# Patient Record
Sex: MALE | Race: WHITE | HISPANIC OR LATINO | URBAN - METROPOLITAN AREA
[De-identification: names, ages, dates, MRNs, and addresses within clinical notes are randomized per-mention and may not be internally consistent; named-entity substitution may affect disease eponyms.]

---

## 2022-09-21 ENCOUNTER — INPATIENT (INPATIENT)
Facility: HOSPITAL | Age: 22
LOS: 6 days | Discharge: HOME | End: 2022-09-28
Attending: THORACIC SURGERY (CARDIOTHORACIC VASCULAR SURGERY) | Admitting: THORACIC SURGERY (CARDIOTHORACIC VASCULAR SURGERY)

## 2022-09-21 VITALS
OXYGEN SATURATION: 98 % | RESPIRATION RATE: 118 BRPM | HEART RATE: 118 BPM | SYSTOLIC BLOOD PRESSURE: 126 MMHG | TEMPERATURE: 104 F | DIASTOLIC BLOOD PRESSURE: 72 MMHG | WEIGHT: 145.06 LBS

## 2022-09-21 DIAGNOSIS — M62.82 RHABDOMYOLYSIS: ICD-10-CM

## 2022-09-21 DIAGNOSIS — R59.0 LOCALIZED ENLARGED LYMPH NODES: ICD-10-CM

## 2022-09-21 DIAGNOSIS — I31.4 CARDIAC TAMPONADE: ICD-10-CM

## 2022-09-21 DIAGNOSIS — E83.51 HYPOCALCEMIA: ICD-10-CM

## 2022-09-21 DIAGNOSIS — R73.09 OTHER ABNORMAL GLUCOSE: ICD-10-CM

## 2022-09-21 DIAGNOSIS — I31.3 PERICARDIAL EFFUSION (NONINFLAMMATORY): ICD-10-CM

## 2022-09-21 DIAGNOSIS — B27.00 GAMMAHERPESVIRAL MONONUCLEOSIS WITHOUT COMPLICATION: ICD-10-CM

## 2022-09-21 DIAGNOSIS — A41.9 SEPSIS, UNSPECIFIED ORGANISM: ICD-10-CM

## 2022-09-21 DIAGNOSIS — E83.39 OTHER DISORDERS OF PHOSPHORUS METABOLISM: ICD-10-CM

## 2022-09-21 DIAGNOSIS — R50.9 FEVER, UNSPECIFIED: ICD-10-CM

## 2022-09-21 DIAGNOSIS — E87.1 HYPO-OSMOLALITY AND HYPONATREMIA: ICD-10-CM

## 2022-09-21 DIAGNOSIS — E87.8 OTHER DISORDERS OF ELECTROLYTE AND FLUID BALANCE, NOT ELSEWHERE CLASSIFIED: ICD-10-CM

## 2022-09-21 DIAGNOSIS — E87.2 ACIDOSIS: ICD-10-CM

## 2022-09-21 LAB
ALBUMIN SERPL ELPH-MCNC: 4.2 G/DL — SIGNIFICANT CHANGE UP (ref 3.5–5.2)
ALP SERPL-CCNC: 82 U/L — SIGNIFICANT CHANGE UP (ref 30–115)
ALT FLD-CCNC: 72 U/L — HIGH (ref 0–41)
ANION GAP SERPL CALC-SCNC: 14 MMOL/L — SIGNIFICANT CHANGE UP (ref 7–14)
APTT BLD: 31.7 SEC — SIGNIFICANT CHANGE UP (ref 27–39.2)
AST SERPL-CCNC: 148 U/L — HIGH (ref 0–41)
BASOPHILS # BLD AUTO: 0.01 K/UL — SIGNIFICANT CHANGE UP (ref 0–0.2)
BASOPHILS NFR BLD AUTO: 0.1 % — SIGNIFICANT CHANGE UP (ref 0–1)
BILIRUB SERPL-MCNC: 0.4 MG/DL — SIGNIFICANT CHANGE UP (ref 0.2–1.2)
BUN SERPL-MCNC: 11 MG/DL — SIGNIFICANT CHANGE UP (ref 10–20)
CALCIUM SERPL-MCNC: 8.7 MG/DL — SIGNIFICANT CHANGE UP (ref 8.4–10.4)
CHLORIDE SERPL-SCNC: 92 MMOL/L — LOW (ref 98–110)
CO2 SERPL-SCNC: 19 MMOL/L — SIGNIFICANT CHANGE UP (ref 17–32)
CREAT SERPL-MCNC: 1 MG/DL — SIGNIFICANT CHANGE UP (ref 0.7–1.5)
EGFR: 110 ML/MIN/1.73M2 — SIGNIFICANT CHANGE UP
EOSINOPHIL # BLD AUTO: 0 K/UL — SIGNIFICANT CHANGE UP (ref 0–0.7)
EOSINOPHIL NFR BLD AUTO: 0 % — SIGNIFICANT CHANGE UP (ref 0–8)
FLUAV AG NPH QL: SIGNIFICANT CHANGE UP
FLUBV AG NPH QL: SIGNIFICANT CHANGE UP
GLUCOSE SERPL-MCNC: 126 MG/DL — HIGH (ref 70–99)
HCT VFR BLD CALC: 44.4 % — SIGNIFICANT CHANGE UP (ref 42–52)
HGB BLD-MCNC: 15.3 G/DL — SIGNIFICANT CHANGE UP (ref 14–18)
IMM GRANULOCYTES NFR BLD AUTO: 0.4 % — HIGH (ref 0.1–0.3)
INR BLD: 1.25 RATIO — SIGNIFICANT CHANGE UP (ref 0.65–1.3)
LACTATE SERPL-SCNC: 1.5 MMOL/L — SIGNIFICANT CHANGE UP (ref 0.7–2)
LYMPHOCYTES # BLD AUTO: 0.92 K/UL — LOW (ref 1.2–3.4)
LYMPHOCYTES # BLD AUTO: 8.8 % — LOW (ref 20.5–51.1)
MAGNESIUM SERPL-MCNC: 2.1 MG/DL — SIGNIFICANT CHANGE UP (ref 1.8–2.4)
MCHC RBC-ENTMCNC: 27.9 PG — SIGNIFICANT CHANGE UP (ref 27–31)
MCHC RBC-ENTMCNC: 34.5 G/DL — SIGNIFICANT CHANGE UP (ref 32–37)
MCV RBC AUTO: 81 FL — SIGNIFICANT CHANGE UP (ref 80–94)
MONOCYTES # BLD AUTO: 0.28 K/UL — SIGNIFICANT CHANGE UP (ref 0.1–0.6)
MONOCYTES NFR BLD AUTO: 2.7 % — SIGNIFICANT CHANGE UP (ref 1.7–9.3)
NEUTROPHILS # BLD AUTO: 9.2 K/UL — HIGH (ref 1.4–6.5)
NEUTROPHILS NFR BLD AUTO: 88 % — HIGH (ref 42.2–75.2)
NRBC # BLD: 0 /100 WBCS — SIGNIFICANT CHANGE UP (ref 0–0)
PLATELET # BLD AUTO: 219 K/UL — SIGNIFICANT CHANGE UP (ref 130–400)
POTASSIUM SERPL-MCNC: 5.5 MMOL/L — HIGH (ref 3.5–5)
POTASSIUM SERPL-SCNC: 5.5 MMOL/L — HIGH (ref 3.5–5)
PROT SERPL-MCNC: 7.4 G/DL — SIGNIFICANT CHANGE UP (ref 6–8)
PROTHROM AB SERPL-ACNC: 14.3 SEC — HIGH (ref 9.95–12.87)
RBC # BLD: 5.48 M/UL — SIGNIFICANT CHANGE UP (ref 4.7–6.1)
RBC # FLD: 13.1 % — SIGNIFICANT CHANGE UP (ref 11.5–14.5)
RSV RNA NPH QL NAA+NON-PROBE: SIGNIFICANT CHANGE UP
SARS-COV-2 RNA SPEC QL NAA+PROBE: SIGNIFICANT CHANGE UP
SODIUM SERPL-SCNC: 125 MMOL/L — LOW (ref 135–146)
WBC # BLD: 10.45 K/UL — SIGNIFICANT CHANGE UP (ref 4.8–10.8)
WBC # FLD AUTO: 10.45 K/UL — SIGNIFICANT CHANGE UP (ref 4.8–10.8)

## 2022-09-21 PROCEDURE — 99285 EMERGENCY DEPT VISIT HI MDM: CPT

## 2022-09-21 PROCEDURE — 93010 ELECTROCARDIOGRAM REPORT: CPT

## 2022-09-21 PROCEDURE — 70491 CT SOFT TISSUE NECK W/DYE: CPT | Mod: 26,MA

## 2022-09-21 PROCEDURE — 71045 X-RAY EXAM CHEST 1 VIEW: CPT | Mod: 26

## 2022-09-21 RX ORDER — PIPERACILLIN AND TAZOBACTAM 4; .5 G/20ML; G/20ML
3.38 INJECTION, POWDER, LYOPHILIZED, FOR SOLUTION INTRAVENOUS ONCE
Refills: 0 | Status: COMPLETED | OUTPATIENT
Start: 2022-09-21 | End: 2022-09-21

## 2022-09-21 RX ORDER — SODIUM CHLORIDE 9 MG/ML
2000 INJECTION, SOLUTION INTRAVENOUS ONCE
Refills: 0 | Status: COMPLETED | OUTPATIENT
Start: 2022-09-21 | End: 2022-09-21

## 2022-09-21 RX ORDER — ACETAMINOPHEN 500 MG
975 TABLET ORAL ONCE
Refills: 0 | Status: COMPLETED | OUTPATIENT
Start: 2022-09-21 | End: 2022-09-21

## 2022-09-21 RX ADMIN — SODIUM CHLORIDE 2000 MILLILITER(S): 9 INJECTION, SOLUTION INTRAVENOUS at 21:50

## 2022-09-21 RX ADMIN — PIPERACILLIN AND TAZOBACTAM 200 GRAM(S): 4; .5 INJECTION, POWDER, LYOPHILIZED, FOR SOLUTION INTRAVENOUS at 22:12

## 2022-09-21 RX ADMIN — Medication 975 MILLIGRAM(S): at 21:50

## 2022-09-21 NOTE — ED PROVIDER NOTE - NS ED ATTENDING STATEMENT MOD
This was a shared visit with the ABISAI. I reviewed and verified the documentation and independently performed the documented:

## 2022-09-21 NOTE — ED PROVIDER NOTE - NS ED ROS FT
Review of Systems  Constitutional: (+) fever, chills, body aches.  Eyes:  No visual changes, eye pain, or discharge.  ENMT:  No hearing changes, pain, or discharge. No nasal congestion, discharge, or bleeding. No throat pain, swelling, or difficulty swallowing. (+) R lower dental pain/swelling  Cardiac:  No chest pain, palpitations, syncope, or edema.  Respiratory:  No dyspnea, cough. No hemoptysis.  GI:  No nausea, vomiting, diarrhea, or abdominal pain.   :  No dysuria, hematuria, frequency, or burning.   MS:  No back pain.  Skin:  No skin rash, pruritis, jaundice, or lesions.  Neuro:  No headache, dizziness, loss of sensation, or focal weakness.  No change in mental status.

## 2022-09-21 NOTE — ED PROVIDER NOTE - CLINICAL SUMMARY MEDICAL DECISION MAKING FREE TEXT BOX
Patient presents with right facial . Required labs and imaging will start on antibiotics. Will be admitted for further management.

## 2022-09-21 NOTE — ED PROVIDER NOTE - ATTENDING APP SHARED VISIT CONTRIBUTION OF CARE
1 week of worsening right facial swelling associate with 2 days of fever.  Able to tolerate p.o. no change in voice denies any tooth pain tolerating secretions endorses associated body aches with symptoms.  On exam there is right facial asymmetry of breath right maxillary swelling.  Uvula is midline.  There is no trismus.  Oropharynx otherwise is normal.  Neck exam is normal with some mild lymphadenopathy on the right side.  Lungs are clear abdomen is soft.  There is no rash noted.  Plan is to obtain CT of the face and neck.  Check blood work and start IV antibiotics

## 2022-09-21 NOTE — ED ADULT TRIAGE NOTE - CHIEF COMPLAINT QUOTE
Pt BIBA from urgent care for R facial swelling x1 week. Pt denies any SOB or difficulty breathing. Pt able to swallow and speak full sentences. Pt with no allergic reaction. Pt in triage temp of 103.6

## 2022-09-21 NOTE — ED PROVIDER NOTE - OBJECTIVE STATEMENT
21 yo M with no significant PMHx presents to the ED c/o R sided lower dental pain x 1 week with development of fever/diffuse myalgias x 2 days. [place] [facility] [place], tested negative for covid and represented today because symptoms are worsening. He denies hx of similar symptoms in the past. He has been taking advil with mild relief of symptoms. No known sick contacts. No other complaints.

## 2022-09-21 NOTE — ED ADULT NURSE NOTE - OBJECTIVE STATEMENT
pt presents co facial swelling x 1 week and fever. Pt states he has not been around anyone ill, no medical hx of swelling and denies any bites to the right side of the face. Pt presents febrile on assessment. AAOx4, speaking in clear and complete sentences (Japanese speaking). Denies pmhx.

## 2022-09-21 NOTE — ED PROVIDER NOTE - PHYSICAL EXAMINATION
VITAL SIGNS: I have reviewed nursing notes and confirm.  CONSTITUTIONAL: 23 yo M laying on stretcher; in no acute distress.  SKIN: Skin exam is warm and dry, no acute rash.  HEAD: Normocephalic; atraumatic.  EYES: PERRL, EOM intact; conjunctiva and sclera clear.  ENT: No nasal discharge; airway clear. No TTP or evidence of dental abscess. Oropharynx clear. Uvula midline. No drooling. (+) R maxillary/submandibular swelling.   NECK: Supple; non tender. No meningeal signs.   CARD: S1, S2 normal; no murmurs, gallops, or rubs. Tachycardic, regular.   RESP: No wheezes, rales or rhonchi. Speaking in full sentences.   ABD: Normal bowel sounds; soft; non-distended; non-tender; No rebound or guarding. No CVA tenderness.  EXT: Normal ROM. No clubbing, cyanosis or edema.  NEURO: Alert, oriented. Grossly unremarkable. No focal deficits.

## 2022-09-22 LAB
ALBUMIN SERPL ELPH-MCNC: 3.7 G/DL — SIGNIFICANT CHANGE UP (ref 3.5–5.2)
ALBUMIN SERPL ELPH-MCNC: 3.9 G/DL — SIGNIFICANT CHANGE UP (ref 3.5–5.2)
ALP SERPL-CCNC: 70 U/L — SIGNIFICANT CHANGE UP (ref 30–115)
ALP SERPL-CCNC: 74 U/L — SIGNIFICANT CHANGE UP (ref 30–115)
ALT FLD-CCNC: 71 U/L — HIGH (ref 0–41)
ALT FLD-CCNC: 85 U/L — HIGH (ref 0–41)
ANION GAP SERPL CALC-SCNC: 13 MMOL/L — SIGNIFICANT CHANGE UP (ref 7–14)
ANION GAP SERPL CALC-SCNC: 15 MMOL/L — HIGH (ref 7–14)
APPEARANCE UR: CLEAR — SIGNIFICANT CHANGE UP
AST SERPL-CCNC: 138 U/L — HIGH (ref 0–41)
AST SERPL-CCNC: 211 U/L — HIGH (ref 0–41)
BACTERIA # UR AUTO: NEGATIVE — SIGNIFICANT CHANGE UP
BILIRUB SERPL-MCNC: 0.5 MG/DL — SIGNIFICANT CHANGE UP (ref 0.2–1.2)
BILIRUB SERPL-MCNC: 0.5 MG/DL — SIGNIFICANT CHANGE UP (ref 0.2–1.2)
BILIRUB UR-MCNC: NEGATIVE — SIGNIFICANT CHANGE UP
BUN SERPL-MCNC: 10 MG/DL — SIGNIFICANT CHANGE UP (ref 10–20)
BUN SERPL-MCNC: 10 MG/DL — SIGNIFICANT CHANGE UP (ref 10–20)
CALCIUM SERPL-MCNC: 8 MG/DL — LOW (ref 8.4–10.5)
CALCIUM SERPL-MCNC: 8.5 MG/DL — SIGNIFICANT CHANGE UP (ref 8.4–10.4)
CHLORIDE SERPL-SCNC: 96 MMOL/L — LOW (ref 98–110)
CHLORIDE SERPL-SCNC: 99 MMOL/L — SIGNIFICANT CHANGE UP (ref 98–110)
CK SERPL-CCNC: 5941 U/L — HIGH (ref 0–225)
CK SERPL-CCNC: HIGH U/L (ref 0–225)
CK SERPL-CCNC: HIGH U/L (ref 0–225)
CO2 SERPL-SCNC: 19 MMOL/L — SIGNIFICANT CHANGE UP (ref 17–32)
CO2 SERPL-SCNC: 22 MMOL/L — SIGNIFICANT CHANGE UP (ref 17–32)
COLOR SPEC: YELLOW — SIGNIFICANT CHANGE UP
CREAT SERPL-MCNC: 0.8 MG/DL — SIGNIFICANT CHANGE UP (ref 0.7–1.5)
CREAT SERPL-MCNC: 0.9 MG/DL — SIGNIFICANT CHANGE UP (ref 0.7–1.5)
CRP SERPL-MCNC: 151.9 MG/L — HIGH
DIFF PNL FLD: ABNORMAL
EGFR: 125 ML/MIN/1.73M2 — SIGNIFICANT CHANGE UP
EGFR: 129 ML/MIN/1.73M2 — SIGNIFICANT CHANGE UP
EPI CELLS # UR: 2 /HPF — SIGNIFICANT CHANGE UP (ref 0–5)
ERYTHROCYTE [SEDIMENTATION RATE] IN BLOOD: 6 MM/HR — SIGNIFICANT CHANGE UP (ref 0–10)
GLUCOSE SERPL-MCNC: 130 MG/DL — HIGH (ref 70–99)
GLUCOSE SERPL-MCNC: 140 MG/DL — HIGH (ref 70–99)
GLUCOSE UR QL: ABNORMAL
HCT VFR BLD CALC: 40.5 % — LOW (ref 42–52)
HGB BLD-MCNC: 14 G/DL — SIGNIFICANT CHANGE UP (ref 14–18)
HYALINE CASTS # UR AUTO: 0 /LPF — SIGNIFICANT CHANGE UP (ref 0–7)
KETONES UR-MCNC: ABNORMAL
LACTATE SERPL-SCNC: 1.6 MMOL/L — SIGNIFICANT CHANGE UP (ref 0.7–2)
LEUKOCYTE ESTERASE UR-ACNC: NEGATIVE — SIGNIFICANT CHANGE UP
MCHC RBC-ENTMCNC: 28 PG — SIGNIFICANT CHANGE UP (ref 27–31)
MCHC RBC-ENTMCNC: 34.6 G/DL — SIGNIFICANT CHANGE UP (ref 32–37)
MCV RBC AUTO: 81 FL — SIGNIFICANT CHANGE UP (ref 80–94)
NITRITE UR-MCNC: NEGATIVE — SIGNIFICANT CHANGE UP
NRBC # BLD: 0 /100 WBCS — SIGNIFICANT CHANGE UP (ref 0–0)
PH UR: 6.5 — SIGNIFICANT CHANGE UP (ref 5–8)
PLATELET # BLD AUTO: 182 K/UL — SIGNIFICANT CHANGE UP (ref 130–400)
POTASSIUM SERPL-MCNC: 3.6 MMOL/L — SIGNIFICANT CHANGE UP (ref 3.5–5)
POTASSIUM SERPL-MCNC: 4.2 MMOL/L — SIGNIFICANT CHANGE UP (ref 3.5–5)
POTASSIUM SERPL-SCNC: 3.6 MMOL/L — SIGNIFICANT CHANGE UP (ref 3.5–5)
POTASSIUM SERPL-SCNC: 4.2 MMOL/L — SIGNIFICANT CHANGE UP (ref 3.5–5)
PROT SERPL-MCNC: 6.1 G/DL — SIGNIFICANT CHANGE UP (ref 6–8)
PROT SERPL-MCNC: 6.3 G/DL — SIGNIFICANT CHANGE UP (ref 6–8)
PROT UR-MCNC: ABNORMAL
RBC # BLD: 5 M/UL — SIGNIFICANT CHANGE UP (ref 4.7–6.1)
RBC # FLD: 13.1 % — SIGNIFICANT CHANGE UP (ref 11.5–14.5)
RBC CASTS # UR COMP ASSIST: 1 /HPF — SIGNIFICANT CHANGE UP (ref 0–4)
SODIUM SERPL-SCNC: 130 MMOL/L — LOW (ref 135–146)
SODIUM SERPL-SCNC: 134 MMOL/L — LOW (ref 135–146)
SP GR SPEC: 1.04 — HIGH (ref 1.01–1.03)
TSH SERPL-MCNC: 0.7 UIU/ML — SIGNIFICANT CHANGE UP (ref 0.27–4.2)
UROBILINOGEN FLD QL: ABNORMAL
WBC # BLD: 9.31 K/UL — SIGNIFICANT CHANGE UP (ref 4.8–10.8)
WBC # FLD AUTO: 9.31 K/UL — SIGNIFICANT CHANGE UP (ref 4.8–10.8)
WBC UR QL: 1 /HPF — SIGNIFICANT CHANGE UP (ref 0–5)

## 2022-09-22 PROCEDURE — 99254 IP/OBS CNSLTJ NEW/EST MOD 60: CPT | Mod: 25

## 2022-09-22 PROCEDURE — 31575 DIAGNOSTIC LARYNGOSCOPY: CPT

## 2022-09-22 PROCEDURE — 99223 1ST HOSP IP/OBS HIGH 75: CPT

## 2022-09-22 PROCEDURE — 76705 ECHO EXAM OF ABDOMEN: CPT | Mod: 26

## 2022-09-22 RX ORDER — ACETAMINOPHEN 500 MG
650 TABLET ORAL EVERY 6 HOURS
Refills: 0 | Status: DISCONTINUED | OUTPATIENT
Start: 2022-09-22 | End: 2022-09-22

## 2022-09-22 RX ORDER — KETOROLAC TROMETHAMINE 30 MG/ML
15 SYRINGE (ML) INJECTION ONCE
Refills: 0 | Status: DISCONTINUED | OUTPATIENT
Start: 2022-09-22 | End: 2022-09-22

## 2022-09-22 RX ORDER — SODIUM CHLORIDE 9 MG/ML
1000 INJECTION INTRAMUSCULAR; INTRAVENOUS; SUBCUTANEOUS
Refills: 0 | Status: DISCONTINUED | OUTPATIENT
Start: 2022-09-22 | End: 2022-09-24

## 2022-09-22 RX ORDER — LANOLIN ALCOHOL/MO/W.PET/CERES
3 CREAM (GRAM) TOPICAL AT BEDTIME
Refills: 0 | Status: DISCONTINUED | OUTPATIENT
Start: 2022-09-22 | End: 2022-09-28

## 2022-09-22 RX ORDER — IBUPROFEN 200 MG
400 TABLET ORAL ONCE
Refills: 0 | Status: COMPLETED | OUTPATIENT
Start: 2022-09-22 | End: 2022-09-22

## 2022-09-22 RX ORDER — AMPICILLIN SODIUM AND SULBACTAM SODIUM 250; 125 MG/ML; MG/ML
3 INJECTION, POWDER, FOR SUSPENSION INTRAMUSCULAR; INTRAVENOUS EVERY 6 HOURS
Refills: 0 | Status: DISCONTINUED | OUTPATIENT
Start: 2022-09-22 | End: 2022-09-28

## 2022-09-22 RX ORDER — ACETAMINOPHEN 500 MG
1000 TABLET ORAL ONCE
Refills: 0 | Status: COMPLETED | OUTPATIENT
Start: 2022-09-22 | End: 2022-09-22

## 2022-09-22 RX ORDER — SODIUM CHLORIDE 9 MG/ML
1000 INJECTION, SOLUTION INTRAVENOUS ONCE
Refills: 0 | Status: COMPLETED | OUTPATIENT
Start: 2022-09-22 | End: 2022-09-22

## 2022-09-22 RX ORDER — SODIUM CHLORIDE 9 MG/ML
1000 INJECTION INTRAMUSCULAR; INTRAVENOUS; SUBCUTANEOUS
Refills: 0 | Status: DISCONTINUED | OUTPATIENT
Start: 2022-09-22 | End: 2022-09-22

## 2022-09-22 RX ORDER — ACETAMINOPHEN 500 MG
650 TABLET ORAL ONCE
Refills: 0 | Status: DISCONTINUED | OUTPATIENT
Start: 2022-09-22 | End: 2022-09-22

## 2022-09-22 RX ORDER — ENOXAPARIN SODIUM 100 MG/ML
40 INJECTION SUBCUTANEOUS EVERY 24 HOURS
Refills: 0 | Status: DISCONTINUED | OUTPATIENT
Start: 2022-09-22 | End: 2022-09-28

## 2022-09-22 RX ORDER — ONDANSETRON 8 MG/1
4 TABLET, FILM COATED ORAL EVERY 8 HOURS
Refills: 0 | Status: DISCONTINUED | OUTPATIENT
Start: 2022-09-22 | End: 2022-09-28

## 2022-09-22 RX ORDER — ACETAMINOPHEN 500 MG
975 TABLET ORAL ONCE
Refills: 0 | Status: COMPLETED | OUTPATIENT
Start: 2022-09-22 | End: 2022-09-22

## 2022-09-22 RX ADMIN — Medication 400 MILLIGRAM(S): at 23:01

## 2022-09-22 RX ADMIN — Medication 1000 MILLIGRAM(S): at 23:31

## 2022-09-22 RX ADMIN — AMPICILLIN SODIUM AND SULBACTAM SODIUM 200 GRAM(S): 250; 125 INJECTION, POWDER, FOR SUSPENSION INTRAMUSCULAR; INTRAVENOUS at 08:55

## 2022-09-22 RX ADMIN — SODIUM CHLORIDE 100 MILLILITER(S): 9 INJECTION INTRAMUSCULAR; INTRAVENOUS; SUBCUTANEOUS at 11:27

## 2022-09-22 RX ADMIN — Medication 15 MILLIGRAM(S): at 09:37

## 2022-09-22 RX ADMIN — Medication 400 MILLIGRAM(S): at 19:17

## 2022-09-22 RX ADMIN — Medication 975 MILLIGRAM(S): at 08:55

## 2022-09-22 RX ADMIN — AMPICILLIN SODIUM AND SULBACTAM SODIUM 200 GRAM(S): 250; 125 INJECTION, POWDER, FOR SUSPENSION INTRAMUSCULAR; INTRAVENOUS at 15:44

## 2022-09-22 RX ADMIN — SODIUM CHLORIDE 125 MILLILITER(S): 9 INJECTION INTRAMUSCULAR; INTRAVENOUS; SUBCUTANEOUS at 10:00

## 2022-09-22 RX ADMIN — AMPICILLIN SODIUM AND SULBACTAM SODIUM 200 GRAM(S): 250; 125 INJECTION, POWDER, FOR SUSPENSION INTRAMUSCULAR; INTRAVENOUS at 22:47

## 2022-09-22 RX ADMIN — SODIUM CHLORIDE 1000 MILLILITER(S): 9 INJECTION, SOLUTION INTRAVENOUS at 06:16

## 2022-09-22 RX ADMIN — ENOXAPARIN SODIUM 40 MILLIGRAM(S): 100 INJECTION SUBCUTANEOUS at 23:01

## 2022-09-22 RX ADMIN — SODIUM CHLORIDE 200 MILLILITER(S): 9 INJECTION INTRAMUSCULAR; INTRAVENOUS; SUBCUTANEOUS at 18:02

## 2022-09-22 RX ADMIN — Medication 650 MILLIGRAM(S): at 15:50

## 2022-09-22 NOTE — H&P ADULT - NSHPPHYSICALEXAM_GEN_ALL_CORE
PHYSICAL EXAM:  GENERAL: NAD  HEAD:  Atraumatic, Normocephalic  EYES: EOMI, PERRLA, conjunctiva and sclera clear  ENMT: R facial swelling; No tonsillar erythema, exudates, or enlargement; Moist mucous membranes  NECK: Supple, No JVD, Normal thyroid  HEART: tachy; Regular rhythm; No murmurs, rubs, or gallops  RESPIRATORY: CTA B/L, No W/R/R  ABDOMEN: Soft, Nontender, Nondistended; Bowel sounds present  NEUROLOGY: A&Ox3, nonfocal, moving all extremities  EXTREMITIES:  2+ Peripheral Pulses, No clubbing, cyanosis, or edema  SKIN: warm, dry, normal color, no rash or abnormal lesions PHYSICAL EXAM:  GENERAL: NAD  HEAD:  Atraumatic, Normocephalic  EYES: EOMI, PERRLA, conjunctiva and sclera clear  ENMT: R facial swelling; No tonsillar erythema, exudates, or enlargement; Moist mucous membranes  NECK: Supple, No JVD, Normal thyroid  HEART: tachy; Regular rhythm; No murmurs, rubs, or gallops  RESPIRATORY: CTA B/L, No W/R/R  ABDOMEN: Soft, Nontender, Nondistended; Bowel sounds present  NEUROLOGY: A&Ox3, nonfocal, moving all extremities  EXTREMITIES:  b/l LE tenderness; 2+ Peripheral Pulses, No clubbing, cyanosis, or edema  SKIN: warm, dry, normal color, no rash or abnormal lesions

## 2022-09-22 NOTE — CONSULT NOTE ADULT - ASSESSMENT
Pt is a 22y M with no significant PMHx presenting to the ED for evaluation of fevers, body aches; ENT consulted for facial swelling/ airway evaluation.     ·	Pt seen and examined at bedside with Dr. Edmond and FFL and CT reviewed  ·	Cont with abx as per ID recs-> Unasyn   ·	No acute ENT intervention at this time   ·	d/w ICU fellow at bedside

## 2022-09-22 NOTE — H&P ADULT - NSHPLABSRESULTS_GEN_ALL_CORE
15.3   10.45 )-----------( 219      ( 21 Sep 2022 21:10 )             44.4           134<L>  |  99  |  10  ----------------------------<  140<H>  4.2   |  22  |  0.9    Ca    8.5      22 Sep 2022 04:45  Mg     2.1         TPro  6.3  /  Alb  3.9  /  TBili  0.5  /  DBili  x   /  AST  138<H>  /  ALT  71<H>  /  AlkPhos  74                Urinalysis Basic - ( 22 Sep 2022 03:15 )    Color: Yellow / Appearance: Clear / S.039 / pH: x  Gluc: x / Ketone: Moderate  / Bili: Negative / Urobili: 3 mg/dL   Blood: x / Protein: 30 mg/dL / Nitrite: Negative   Leuk Esterase: Negative / RBC: 1 /HPF / WBC 1 /HPF   Sq Epi: x / Non Sq Epi: 2 /HPF / Bacteria: Negative        PT/INR - ( 21 Sep 2022 22:31 )   PT: 14.30 sec;   INR: 1.25 ratio         PTT - ( 21 Sep 2022 22:31 )  PTT:31.7 sec    Lactate Trend   @ 21:10 Lactate:1.5       CARDIAC MARKERS ( 22 Sep 2022 04:45 )  x     / x     / 5941 U/L / x     / x            CAPILLARY BLOOD GLUCOSE      < from: CT Neck Soft Tissue w/ IV Cont (22 @ 22:06) >      Impression:      Asymmetrically enlarged right submandibular and right anterior upper   jugular lymph nodes that maintain reniform appearance. Associated   adjacent soft tissue changes without drainable fluid collection. Etiology   includes infectious/inflammatory. Recommend follow-up imaging such as   neck sonography within 1 month to follow for resolution.    --- End of Report ---    < end of copied text >    < from: Xray Chest 1 View- PORTABLE-Urgent (22 @ 22:34) >      Impression:    No radiographic evidence of acute cardiopulmonary disease.      < end of copied text >

## 2022-09-22 NOTE — CONSULT NOTE ADULT - SUBJECTIVE AND OBJECTIVE BOX
ENT CONSULT :  Pt is a 22y M     PAST MEDICAL & SURGICAL HISTORY:  No pertinent past medical history    No significant past surgical history      MEDICATIONS  (STANDING):  ampicillin/sulbactam  IVPB 3 Gram(s) IV Intermittent every 6 hours  enoxaparin Injectable 40 milliGRAM(s) SubCutaneous every 24 hours  sodium chloride 0.9%. 1000 milliLiter(s) (200 mL/Hr) IV Continuous <Continuous>    MEDICATIONS  (PRN):  acetaminophen     Tablet .. 650 milliGRAM(s) Oral every 6 hours PRN Temp greater or equal to 38C (100.4F), Mild Pain (1 - 3)  aluminum hydroxide/magnesium hydroxide/simethicone Suspension 30 milliLiter(s) Oral every 4 hours PRN Dyspepsia  melatonin 3 milliGRAM(s) Oral at bedtime PRN Insomnia  ondansetron Injectable 4 milliGRAM(s) IV Push every 8 hours PRN Nausea and/or Vomiting      Allergies  No Known Allergies    SOCIAL HISTORY:    FAMILY HISTORY:      Review of Systems:  [X] A ten point review of systems was otherwise negative except as noted.      Vital Signs Last 24 Hrs  T(C): 38.3 (22 Sep 2022 15:50), Max: 40.9 (22 Sep 2022 09:33)  T(F): 101 (22 Sep 2022 15:50), Max: 105.6 (22 Sep 2022 09:33)  HR: 108 (22 Sep 2022 15:50) (68 - 140)  BP: 114/61 (22 Sep 2022 09:33) (107/61 - 142/98)  RR: 18 (22 Sep 2022 15:50) (18 - 18)  SpO2: 97% (22 Sep 2022 15:50) (97% - 100%)    Parameters below as of 22 Sep 2022 15:50  Patient On (Oxygen Delivery Method): room air      GEN: NAD.  No drooling or pooling of secretions. No stridor. Good vocal quality, no hoarseness.   NEURO: Awake and alert   SKIN: Non diaphoretic  HEENT: NC/AT; Oral mucosa pink and moist. +R sided facial edema noted, mild induration noted to R cheek. No ttp. No intraoral drainage. FOM soft. No edema noted to posterior OP. Uvula midline   RESP: Non-labored breathing. No stridor   CARDIO: +S1/S2  ABDO: Soft, NT.  EXT: AVILES x 4    Fiberoptic Laryngoscopy: Laryngeal structures intact, no edema or erythema noted. Epiglottis crisp, no edema. Airway patent.     LABS:                        14.0   9.31  )-----------( 182      ( 22 Sep 2022 11:38 )             40.5         130<L>  |  96<L>  |  10  ----------------------------<  130<H>  3.6   |  19  |  0.8    Ca    8.0<L>      22 Sep 2022 11:38  Mg     2.1         TPro  6.1  /  Alb  3.7  /  TBili  0.5  /  DBili  x   /  AST  211<H>  /  ALT  85<H>  /  AlkPhos  70      PT/INR - ( 21 Sep 2022 22:31 )   PT: 14.30 sec;   INR: 1.25 ratio         PTT - ( 21 Sep 2022 22:31 )  PTT:31.7 sec  Urinalysis Basic - ( 22 Sep 2022 03:15 )    Color: Yellow / Appearance: Clear / S.039 / pH: x  Gluc: x / Ketone: Moderate  / Bili: Negative / Urobili: 3 mg/dL   Blood: x / Protein: 30 mg/dL / Nitrite: Negative   Leuk Esterase: Negative / RBC: 1 /HPF / WBC 1 /HPF   Sq Epi: x / Non Sq Epi: 2 /HPF / Bacteria: Negative      RADIOLOGY & ADDITIONAL STUDIES:  < from: CT Neck Soft Tissue w/ IV Cont (22 @ 22:06) >    ACC: [id number] EXAM:  CT NECK SOFT TISSUE IC                          PROCEDURE DATE:  2022          INTERPRETATION:  Clinical history: Febrile with submandibular swelling.    Technique:  Multidetector axial CT images of the neck were obtained  following the intravenous administration of cc of nonionic contrast.   Images were reformatted on multiple planes and reviewed in bone and   soft-tissue windows.    Comparison: None.    Findings:  Aerodigestive structures: Normal.  No evidence of abnormal enhancement.    Thyroid gland: Normal.  Parotid and submandibular glands:  Normal.    Lymph nodes: Asymmetrically enlarged reniform shaped right   submandibular/level Ib lymph node measuring 2 cm and right anterior upper   jugular/level IIa lymph node measuring 1.3 cm. Inflammatory no changes   are noted involving adjacent right perimandibular subcutaneous soft   tissues and platysma. There is no evidence of drainable fluid collection.    Vascular structures: Normal.    Osseous structures:No fracture, dislocation or destructive lesion.    Paranasal sinuses: Clear.  Mastoid air cells:  Clear.    Partially visualized intracranial structures: Normal.    Partially visualized lung apices: Normal.      Impression:    Asymmetrically enlarged right submandibular and right anterior upper   jugular lymph nodes that maintain reniform appearance. Associated   adjacent soft tissue changes without drainable fluid collection. Etiology   includes infectious/inflammatory. Recommend follow-up imaging such as   neck sonography within 1 month to follow for resolution.    --- End of Report ---    CHUCK OLMEDO MD; Resident Radiologist  This document has been electronically signed.  MARTA MCNALLY MD; Attending Radiologist  This document has been electronically signed. Sep 22 2022  3:11AM    < end of copied text >   ENT CONSULT :  Pt is a 22y M with no significant PMHx presenting to the ED for evaluation of fevers, body aches; ENT consulted for facial swelling and airway evaluation. Pt seen and examined at bedside; hx obtained with help  (Emile, ID# [id number]). Pt reports he has had high fevers with associated body aches for the past three days. When asked about facial swelling, pt reports he has had R sided facial swelling for approx 1 week. Pt denies associated pain. States swelling has remained the same for the past week. Denies intraoral drainage, recent dental work.     PAST MEDICAL & SURGICAL HISTORY:  No pertinent past medical history    No significant past surgical history      MEDICATIONS  (STANDING):  ampicillin/sulbactam  IVPB 3 Gram(s) IV Intermittent every 6 hours  enoxaparin Injectable 40 milliGRAM(s) SubCutaneous every 24 hours  sodium chloride 0.9%. 1000 milliLiter(s) (200 mL/Hr) IV Continuous <Continuous>    MEDICATIONS  (PRN):  acetaminophen     Tablet .. 650 milliGRAM(s) Oral every 6 hours PRN Temp greater or equal to 38C (100.4F), Mild Pain (1 - 3)  aluminum hydroxide/magnesium hydroxide/simethicone Suspension 30 milliLiter(s) Oral every 4 hours PRN Dyspepsia  melatonin 3 milliGRAM(s) Oral at bedtime PRN Insomnia  ondansetron Injectable 4 milliGRAM(s) IV Push every 8 hours PRN Nausea and/or Vomiting      Allergies  No Known Allergies    SOCIAL HISTORY:    FAMILY HISTORY:      Review of Systems:  [X] A ten point review of systems was otherwise negative except as noted.      Vital Signs Last 24 Hrs  T(C): 38.3 (22 Sep 2022 15:50), Max: 40.9 (22 Sep 2022 09:33)  T(F): 101 (22 Sep 2022 15:50), Max: 105.6 (22 Sep 2022 09:33)  HR: 108 (22 Sep 2022 15:50) (68 - 140)  BP: 114/61 (22 Sep 2022 09:33) (107/61 - 142/98)  RR: 18 (22 Sep 2022 15:50) (18 - 18)  SpO2: 97% (22 Sep 2022 15:50) (97% - 100%)    Parameters below as of 22 Sep 2022 15:50  Patient On (Oxygen Delivery Method): room air      GEN: NAD.  No drooling or pooling of secretions. No stridor. Good vocal quality, no hoarseness.   NEURO: Awake and alert   SKIN: Non diaphoretic  HEENT: NC/AT; Oral mucosa pink and moist. +R sided facial edema noted, mild induration noted to R cheek. No ttp. No intraoral drainage. FOM soft. No edema noted to posterior OP. Uvula midline   RESP: Non-labored breathing. No stridor   CARDIO: +S1/S2  ABDO: Soft, NT.  EXT: AVILES x 4    Fiberoptic Laryngoscopy: Laryngeal structures intact, no edema or erythema noted. Epiglottis crisp, no edema. Airway patent.     LABS:                        14.0   9.31  )-----------( 182      ( 22 Sep 2022 11:38 )             40.5         130<L>  |  96<L>  |  10  ----------------------------<  130<H>  3.6   |  19  |  0.8    Ca    8.0<L>      22 Sep 2022 11:38  Mg     2.1         TPro  6.1  /  Alb  3.7  /  TBili  0.5  /  DBili  x   /  AST  211<H>  /  ALT  85<H>  /  AlkPhos  70      PT/INR - ( 21 Sep 2022 22:31 )   PT: 14.30 sec;   INR: 1.25 ratio         PTT - ( 21 Sep 2022 22:31 )  PTT:31.7 sec  Urinalysis Basic - ( 22 Sep 2022 03:15 )    Color: Yellow / Appearance: Clear / S.039 / pH: x  Gluc: x / Ketone: Moderate  / Bili: Negative / Urobili: 3 mg/dL   Blood: x / Protein: 30 mg/dL / Nitrite: Negative   Leuk Esterase: Negative / RBC: 1 /HPF / WBC 1 /HPF   Sq Epi: x / Non Sq Epi: 2 /HPF / Bacteria: Negative      RADIOLOGY & ADDITIONAL STUDIES:  < from: CT Neck Soft Tissue w/ IV Cont (22 @ 22:06) >    ACC: [id number] EXAM:  CT NECK SOFT TISSUE IC                          PROCEDURE DATE:  2022          INTERPRETATION:  Clinical history: Febrile with submandibular swelling.    Technique:  Multidetector axial CT images of the neck were obtained  following the intravenous administration of cc of nonionic contrast.   Images were reformatted on multiple planes and reviewed in bone and   soft-tissue windows.    Comparison: None.    Findings:  Aerodigestive structures: Normal.  No evidence of abnormal enhancement.    Thyroid gland: Normal.  Parotid and submandibular glands:  Normal.    Lymph nodes: Asymmetrically enlarged reniform shaped right   submandibular/level Ib lymph node measuring 2 cm and right anterior upper   jugular/level IIa lymph node measuring 1.3 cm. Inflammatory no changes   are noted involving adjacent right perimandibular subcutaneous soft   tissues and platysma. There is no evidence of drainable fluid collection.    Vascular structures: Normal.    Osseous structures:No fracture, dislocation or destructive lesion.    Paranasal sinuses: Clear.  Mastoid air cells:  Clear.    Partially visualized intracranial structures: Normal.    Partially visualized lung apices: Normal.      Impression:    Asymmetrically enlarged right submandibular and right anterior upper   jugular lymph nodes that maintain reniform appearance. Associated   adjacent soft tissue changes without drainable fluid collection. Etiology   includes infectious/inflammatory. Recommend follow-up imaging such as   neck sonography within 1 month to follow for resolution.    --- End of Report ---    CHUCK OLMEDO MD; Resident Radiologist  This document has been electronically signed.  MARTA MCNALLY MD; Attending Radiologist  This document has been electronically signed. Sep 22 2022  3:11AM    < end of copied text >

## 2022-09-22 NOTE — CONSULT NOTE ADULT - SUBJECTIVE AND OBJECTIVE BOX
Patient is a 22y old  Male who presents with a chief complaint of fever, facial swelling (22 Sep 2022 08:22)      HPI:  23 yo M with no significant PMHx presents to the ED c/o R sided lower dental pain x 1 week with development of fever/diffuse myalgias x 3 days. Pt had gone work on Monday in his usual state of health. When he came home that night he had begun complaining of aches and feeling unwell. Over the past few days, his dental pain has gotten worse. He also endorses having fever as high as 103. He reports the pain has made it difficult for him to walk; he also became nauseous when eating and he hasn't had anything but water for past two days. He denies any headaches, sob, chest pain, abd pain, diarrhea, constipation. Pt has no recent dental work, no trauma, no known sick contacts, no pets or animal contact. Pt's father reports he has not had any vaccinations.     Pt went to urgent care last night where he tested negative for covid and was given tylenol. He was told to come to the ED. In the ED, he was given a bolus of fluids, tylenol, and zosyn. CT showed enlarged right submandibular and right anterior upper jugular lymph nodes and associated adjacent soft tissue changes without drainable fluid collection.  (22 Sep 2022 08:22)      PAST MEDICAL & SURGICAL HISTORY:  No pertinent past medical history      No significant past surgical history          SOCIAL HX:   Smoking       never    FAMILY HISTORY:  :  No known cardiovacular family hisotry     Review Of Systems:     All ROS are negative except per HPI       Allergies    No Known Allergies    Intolerances          PHYSICAL EXAM    ICU Vital Signs Last 24 Hrs  T(C): 38.8 (22 Sep 2022 11:23), Max: 40.9 (22 Sep 2022 09:33)  T(F): 101.9 (22 Sep 2022 11:23), Max: 105.6 (22 Sep 2022 09:33)  HR: 140 (22 Sep 2022 10:20) (68 - 140)  BP: 114/61 (22 Sep 2022 09:33) (107/61 - 142/98)  BP(mean): --  ABP: --  ABP(mean): --  RR: 18 (22 Sep 2022 09:33) (18 - 18)  SpO2: 97% (22 Sep 2022 09:33) (97% - 100%)    O2 Parameters below as of 22 Sep 2022 09:33  Patient On (Oxygen Delivery Method): room air            CONSTITUTIONAL:  Well nourished.  NAD    ENT:   Airway patent,   Mouth with normal mucosa.   No thrush    EYES:   pupils equal,   round and reactive to light.    CARDIAC:   Normal rate,   Regular rhythm.    Heart sounds S1, S2.   No edema    RESPIRATORY:   No wheezing   Normal chest expansion  No use of accessory muscles    GASTROINTESTINAL:  Abdomen soft   Non-tender,   No guarding,   + BS    MUSCULOSKELETAL:   Range of motion is not limited,  Nno clubbing, cyanosis    NEUROLOGICAL:   Alert and oriented   No motor deficits.    SKIN:   Warm and dry  No evidence of rash.    PSYCHIATRIC:   Normal mood and affect.   No apparent risk to self or others.              LABS:                          14.0   9.31  )-----------( 182      ( 22 Sep 2022 11:38 )             40.5                                                   130<L>  |  96<L>  |  10  ----------------------------<  130<H>  3.6   |  19  |  0.8    Ca    8.0<L>      22 Sep 2022 11:38  Mg     2.1     -    TPro  6.1  /  Alb  3.7  /  TBili  0.5  /  DBili  x   /  AST  211<H>  /  ALT  85<H>  /  AlkPhos  70  -      PT/INR - ( 21 Sep 2022 22:31 )   PT: 14.30 sec;   INR: 1.25 ratio         PTT - ( 21 Sep 2022 22:31 )  PTT:31.7 sec                                       Urinalysis Basic - ( 22 Sep 2022 03:15 )    Color: Yellow / Appearance: Clear / S.039 / pH: x  Gluc: x / Ketone: Moderate  / Bili: Negative / Urobili: 3 mg/dL   Blood: x / Protein: 30 mg/dL / Nitrite: Negative   Leuk Esterase: Negative / RBC: 1 /HPF / WBC 1 /HPF   Sq Epi: x / Non Sq Epi: 2 /HPF / Bacteria: Negative        CARDIAC MARKERS ( 22 Sep 2022 11:38 )  x     / x     / 33257 U/L / x     / x      CARDIAC MARKERS ( 22 Sep 2022 04:45 )  x     / x     / 5941 U/L / x     / x                                                LIVER FUNCTIONS - ( 22 Sep 2022 11:38 )  Alb: 3.7 g/dL / Pro: 6.1 g/dL / ALK PHOS: 70 U/L / ALT: 85 U/L / AST: 211 U/L / GGT: x                                                                                                            MEDICATIONS  (STANDING):  ampicillin/sulbactam  IVPB 3 Gram(s) IV Intermittent every 6 hours  enoxaparin Injectable 40 milliGRAM(s) SubCutaneous every 24 hours  sodium chloride 0.9%. 1000 milliLiter(s) (100 mL/Hr) IV Continuous <Continuous>    MEDICATIONS  (PRN):  acetaminophen     Tablet .. 650 milliGRAM(s) Oral every 6 hours PRN Temp greater or equal to 38C (100.4F), Mild Pain (1 - 3)  aluminum hydroxide/magnesium hydroxide/simethicone Suspension 30 milliLiter(s) Oral every 4 hours PRN Dyspepsia  melatonin 3 milliGRAM(s) Oral at bedtime PRN Insomnia  ondansetron Injectable 4 milliGRAM(s) IV Push every 8 hours PRN Nausea and/or Vomiting

## 2022-09-22 NOTE — CONSULT NOTE ADULT - SUBJECTIVE AND OBJECTIVE BOX
DEMETRIA LAZCANO  22y, Male  Allergy: No Known Allergies      CHIEF COMPLAINT:     HPI:  23 yo M with no significant PMHx presents to the ED c/o R sided lower dental pain x 1 week with development of fever/diffuse myalgias x 2 days. Pt went to [facility] a few days ago, tested negative for covid and represented today because symptoms are worsening. He denies hx of similar symptoms in the past. He has been taking advil with mild relief of symptoms. No known sick contacts. No other complaints.    FAMILY HISTORY:  No pertinent family history in first degree relatives      PAST MEDICAL & SURGICAL HISTORY:  No pertinent past medical history      No significant past surgical history          SOCIAL HISTORY  Social History:        ROS  HEENT: Right fascial swelling  CV: Denies CP, palpitations  PULM: Denies SOB, cough  GI: Denies abdominal pain, diarrhea  : Denies dysuria, hematuria  MSK: Denies arthralgias  SKIN: Denies rash   NEURO: Denies paresthesias, weakness  PSYCH: Denies depression    VITALS:  T(F): 99.4, Max: 103.6 (22 @ 19:57)  HR: 68  BP: 141/78  RR: 18Vital Signs Last 24 Hrs  T(C): 37.4 (22 Sep 2022 00:12), Max: 39.8 (21 Sep 2022 19:57)  T(F): 99.4 (22 Sep 2022 00:12), Max: 103.6 (21 Sep 2022 19:57)  HR: 68 (22 Sep 2022 05:00) (68 - 118)  BP: 141/78 (22 Sep 2022 04:37) (107/61 - 142/98)  BP(mean): --  RR: 18 (22 Sep 2022 05:00) (18 - 18)  SpO2: 99% (22 Sep 2022 05:00) (97% - 100%)    Parameters below as of 21 Sep 2022 19:57  Patient On (Oxygen Delivery Method): room air        PHYSICAL EXAM:  Gen: NAD, resting in bed  HEENT: Normocephalic, atraumatic, right fascial swelling  Neck: supple, no lymphadenopathy  CV: Regular rate & regular rhythm  Lungs: decreased BS at bases, no fremitus  Abdomen: Soft, BS present  Ext: Warm, well perfused  Neuro: non focal, awake  Skin: no rash, no erythema    TESTS & MEASUREMENTS:                        15.3   10.45 )-----------( 219      ( 21 Sep 2022 21:10 )             44.4         134<L>  |  99  |  10  ----------------------------<  140<H>  4.2   |  22  |  0.9    Ca    8.5      22 Sep 2022 04:45  Mg     2.1         TPro  6.3  /  Alb  3.9  /  TBili  0.5  /  DBili  x   /  AST  138<H>  /  ALT  71<H>  /  AlkPhos  74        LIVER FUNCTIONS - ( 22 Sep 2022 04:45 )  Alb: 3.9 g/dL / Pro: 6.3 g/dL / ALK PHOS: 74 U/L / ALT: 71 U/L / AST: 138 U/L / GGT: x           Urinalysis Basic - ( 22 Sep 2022 03:15 )    Color: Yellow / Appearance: Clear / S.039 / pH: x  Gluc: x / Ketone: Moderate  / Bili: Negative / Urobili: 3 mg/dL   Blood: x / Protein: 30 mg/dL / Nitrite: Negative   Leuk Esterase: Negative / RBC: 1 /HPF / WBC 1 /HPF   Sq Epi: x / Non Sq Epi: 2 /HPF / Bacteria: Negative          Lactate, Blood: 1.5 mmol/L (22 @ 21:10)      INFECTIOUS DISEASES TESTING      RADIOLOGY & ADDITIONAL TESTS:  I have personally reviewed the last Chest xray  CXR      CT      CARDIOLOGY TESTING      MEDICATIONS      ANTIBIOTICS:      All available historical data has been reviewed

## 2022-09-22 NOTE — H&P ADULT - ASSESSMENT
ASSESSMENT & PLAN:  23 yo M with no significant PMHx presents to the ED c/o R sided lower dental pain x 1 week with development of fever/diffuse myalgias x 3 days. Pt has no recent dental work, no trauma, no known sick contacts, no pets or animal contact. Pt's father reports he has not had any vaccinations.  CT showed enlarged right submandibular and right anterior upper jugular lymph nodes and associated adjacent soft tissue changes without drainable fluid collection.     #Sepsis (T>101, pulse >90)  #R Facial swelling and dental pain  - 1 week h/o right sided dental pain; 3 day h/o fever, diffuse myalgias  - Tmax at 105 (oral and rectal) in ED  - CT: enlarged right submandibular and right anterior upper jugular lymph nodes and associated adjacent soft tissue changes without drainable fluid collection.   - CXR - no acute pathology  - ID consulted  - f/u blood and urine cultures  - s/p zosyn in ED  - start Unasyn 3gm q6h IVPB per ID recs  - ESR, CRP  - EBV IgM, CMV IgM, Toxoplasma IgM  - dental consult  - ENT consult    #Elevated CPK  - CK 6941  - repeat CK    #Transaminitis  -  -> 138  - ALT 72 -> 71  - repeat LFTs    #Hyponatremia - resolving  - 125 -> 134    #Misc  - DVT Prophylaxis:  - GI Prophylaxis:  - Diet:  - Activity:  - IV Fluids:  - Code Status:    Dispo: ASSESSMENT & PLAN:  23 yo M with no significant PMHx presents to the ED c/o R sided lower dental pain x 1 week with development of fever/diffuse myalgias x 3 days. Pt has no recent dental work, no trauma, no known sick contacts, no pets or animal contact. Pt's father reports he has not had any vaccinations.  CT showed enlarged right submandibular and right anterior upper jugular lymph nodes and associated adjacent soft tissue changes without drainable fluid collection.     #Sepsis (T>101, pulse >90)  #R Facial swelling and dental pain  - 1 week h/o right sided dental pain; 3 day h/o fever, diffuse myalgias  - Tmax at 105 (oral and rectal) in ED  - CT: enlarged right submandibular and right anterior upper jugular lymph nodes and associated adjacent soft tissue changes without drainable fluid collection.   - CXR - no acute pathology  - ID consulted  - f/u blood and urine cultures  - s/p zosyn in ED  - start Unasyn 3gm q6h IVPB per ID recs  -  cc/hr  - ESR, CRP  - EBV IgM, CMV IgM, Toxoplasma IgM  - dental consult  - ENT consult  - consult ICU for possible step down placement    #Elevated CPK  #Transaminitis  - CK 6941  -  -> 138  - ALT 72 -> 71  - repeat CK, LFTs  - suspected rhadbo 2/2 sepsis/severe myalgias for past 3 days    #Hyponatremia - resolving  - 125 -> 134  - IVF    #Misc  - DVT Prophylaxis: lovenox  - Diet: regular  - Activity: as tolerated; PT  - IV Fluids: NS 125cc/hr    Dispo: medicine ASSESSMENT & PLAN:  21 yo M with no significant PMHx presents to the ED c/o R sided lower dental pain x 1 week with development of fever/diffuse myalgias x 3 days. Pt has no recent dental work, no trauma, no known sick contacts, no pets or animal contact. Pt's father reports he has not had any vaccinations.  CT showed enlarged right submandibular and right anterior upper jugular lymph nodes and associated adjacent soft tissue changes without drainable fluid collection.     #Sepsis (T>101, pulse >90)  #R Facial swelling and dental pain  - 1 week h/o right sided dental pain; 3 day h/o fever, diffuse myalgias  - Tmax at 105 (oral and rectal) in ED  - CT: enlarged right submandibular and right anterior upper jugular lymph nodes and associated adjacent soft tissue changes without drainable fluid collection.   - CXR - no acute pathology  - ID consulted  - f/u blood and urine cultures  - s/p zosyn in ED  - start Unasyn 3gm q6h IVPB per ID recs  -  cc/hr  - ESR, CRP  - EBV IgM, CMV IgM, Toxoplasma IgM  - dental consult  - ENT consult  - consult ICU for possible step down placement    #Elevated CPK  - CK 6941  - repeat CK  - suspected rhadbo 2/2 sepsis/severe myalgias for past 3 days  -  cc/hr NS    #Transaminitis  -  -> 138  - ALT 72 -> 71  - repeat LFTs  - RUQ US    #Hyponatremia - resolving  - 125 -> 134  - IVF    #Misc  - DVT Prophylaxis: lovenox  - Diet: regular  - Activity: as tolerated; PT  - IV Fluids: NS 125cc/hr    Dispo: medicine ASSESSMENT & PLAN:  23 yo M with no significant PMHx presents to the ED c/o R sided lower dental pain x 1 week with development of fever/diffuse myalgias x 3 days. Pt has no recent dental work, no trauma, no known sick contacts, no pets or animal contact. Pt's father reports he has not had any vaccinations.  CT showed enlarged right submandibular and right anterior upper jugular lymph nodes and associated adjacent soft tissue changes without drainable fluid collection.     #Sepsis (T>101, pulse >90)  #R Facial swelling and dental pain  - 1 week h/o right sided dental pain; 3 day h/o fever, diffuse myalgias  - Tmax at 105 (oral and rectal) in ED  - CT: enlarged right submandibular and right anterior upper jugular lymph nodes and associated adjacent soft tissue changes without drainable fluid collection.   - CXR - no acute pathology  - ID consulted  - f/u blood and urine cultures  - s/p zosyn in ED  - start Unasyn 3gm q6h IVPB per ID recs  -  cc/hr  - ESR, CRP  - EBV IgM, CMV IgM, Toxoplasma IgM  - dental consult  - ENT consult  - consult ICU for possible step down placement    #Elevated CPK  - CK 5941  - repeat CK  - suspected rhadbo 2/2 sepsis/severe myalgias for past 3 days  -  cc/hr NS    #Transaminitis  -  -> 138  - ALT 72 -> 71  - repeat LFTs  - RUQ US    #Hyponatremia - resolving  - 125 -> 134  - IVF    #Misc  - DVT Prophylaxis: lovenox  - Diet: regular  - Activity: as tolerated; PT  - IV Fluids: NS 125cc/hr    Dispo: medicine

## 2022-09-22 NOTE — PHYSICAL THERAPY INITIAL EVALUATION ADULT - SPECIFY REASON(S)
The patient does not have activity orders. Dr. Luis Suárez notified via Teams message. PT will f/u as appropriate.

## 2022-09-22 NOTE — PHYSICAL THERAPY INITIAL EVALUATION ADULT - LIVES WITH, PROFILE
brothers in a house with steps to enter brothers in a house with 3 steps to enter and one inside to bedroom

## 2022-09-22 NOTE — H&P ADULT - HISTORY OF PRESENT ILLNESS
21 yo M with no significant PMHx presents to the ED c/o R sided lower dental pain x 1 week with development of fever/diffuse myalgias x 3 days. Pt had gone work on Monday in his usual state of health. When he came home that night he had begun complaining of aches and feeling unwell. Over the past few days, his dental pain has gotten worse. He also endorses having fever as high as 103. He reports the pain has made it difficult for him to walk; he also became nauseous when eating and he hasn't had anything but water for past two days. He denies any headaches, sob, chest pain, abd pain, diarrhea, constipation. Pt has no recent dental work, no trauma, no known sick contacts, no pets or animal contact. Pt's father reports he has not had any vaccinations.     Pt went to urgent care last night where he tested negative for covid and was given tylenol. He was told to come to the ED. In the ED, he was given a bolus of fluids, tylenol, and zosyn. CT showed enlarged right submandibular and right anterior upper jugular lymph nodes and associated adjacent soft tissue changes without drainable fluid collection.

## 2022-09-22 NOTE — CONSULT NOTE ADULT - ASSESSMENT
ASSESSMENT  22y M admitted with FEVER; SUBMANDIBULAR LYMPHADENOPATHY    21 yo M with no significant PMHx presents to the ED c/o R sided lower dental pain x 1 week with development of fever/diffuse myalgias x 2 days. Pt went to [facility] a few days ago, tested negative for covid and represented today because symptoms are worsening. He denies hx of similar symptoms in the past. He has been taking advil with mild relief of symptoms. No known sick contacts. No other complaints.    PROBLEMS  #Sepsis (T>101F, Pulse>90) with cervical lymphadenopathy; R/O fascial cellultis    CT neck with asymmetrically enlarged right submandibular and right anterior upper jugular lymph nodes that maintain reniform appearance. Associated adjacent soft tissue changes without drainable fluid collection. Etiology   includes infectious/inflammatory.    New problem with additional W/U  acute illness with systemic symptoms    #CPK 5941  #Hyponatremia Na 125 to 134  #Transaminitis      PLAN  - Await blood cultures, urine culture  - Unasyn 3gm q6h IVPB  - ESR, CRP  - Repeat sodium, wbc, CPK  - Dental consult  - ENT consult  - Await official Cxray result  - EBV IgM, CMV IgM, Toxoplasma IgM

## 2022-09-22 NOTE — H&P ADULT - ATTENDING COMMENTS
21 yo M with no significant PMHx presents to the ED c/o R sided lower dental pain x 1 week with development of fever/diffuse myalgias x 3 days. Patient has had R sided jaw swelling for three weeks and has become worse which lead him to come to the ER. Pt had gone work on Monday in his usual state of health. When he came home that night he had begun complaining of aches and feeling unwell. Over the past few days, his dental pain has gotten worse. He also endorses having fever as high as 103. He reports the pain has made it difficult for him to walk; he also became nauseous when eating and he hasn't had anything but water for past two days. He denies any headaches, sob, chest pain, abd pain, diarrhea, constipation. Pt has no recent dental work, no trauma, no known sick contacts, no pets or animal contact. Pt's father reports he has not had any vaccinations.     Pt went to urgent care last night where he tested negative for covid and was given tylenol. He was told to come to the ED. In the ED, he was given a bolus of fluids, tylenol, and zosyn. CT showed enlarged right submandibular and right anterior upper jugular lymph nodes and associated adjacent soft tissue changes without drainable fluid collection.      Sepsis (T>101, pulse >90) without end organ damage w/ R sided facial edema  - Tmax at 105, tachycardia  - CT: enlarged R submandibular and R anterior upper jugular lymph nodes and associated adjacent soft tissue changes without drainable fluid collection.   - CXR - no acute pathology  - ID following - recs appr - continue Unasyn 3gm q6h  - Blood and Urine Cx pending  - EBV IgM, CMV IgM, Toxoplasma IgM, ESR, CRP pending  -  cc/hr  - dental consult  - ENT consult    Rhabdo 2/2 Above  - CK 5941 -->10k  - UA positive for Blood, negative for RBC  -  cc/hr NS    #Transaminitis  -  -> 138  - ALT 72 -> 71  - repeat LFTs  - RUQ US- Increased hepatic echogenicity compatible with hepatic steatosis or   hepatocellular disease.    #Hyponatremia - resolving  - 125 -> 134 -->130  - IVF    #Misc  - DVT Prophylaxis: lovenox  - Diet: regular  - Activity: as tolerated; PT  - IV Fluids: NS 200cc/hr    Dispo: medicine 21 yo M with no significant PMHx presents to the ED c/o R sided lower dental pain x 1 week with development of fever/diffuse myalgias x 3 days. Patient has had R sided jaw swelling for three weeks and has become worse which lead him to come to the ER. Pt had gone work on Monday in his usual state of health. When he came home that night he had begun complaining of aches and feeling unwell. Over the past few days, his dental pain has gotten worse. He also endorses having fever as high as 103. He reports the pain has made it difficult for him to walk; he also became nauseous when eating and he hasn't had anything but water for past two days. He denies any headaches, sob, chest pain, abd pain, diarrhea, constipation. Pt has no recent dental work, no trauma, no known sick contacts, no pets or animal contact. Pt's father reports he has not had any vaccinations.     Pt went to urgent care last night where he tested negative for covid and was given tylenol. He was told to come to the ED. In the ED, he was given a bolus of fluids, tylenol, and zosyn. CT showed enlarged right submandibular and right anterior upper jugular lymph nodes and associated adjacent soft tissue changes without drainable fluid collection.      Sepsis (T>101, pulse >90) without end organ damage w/ R sided facial edema  - Tmax at 105, tachycardia  - CT: enlarged R submandibular and R anterior upper jugular lymph nodes and associated adjacent soft tissue changes without drainable fluid collection.   - CXR - no acute pathology  - ID following - recs appr - continue Unasyn 3gm q6h  - Blood and Urine Cx pending  - EBV IgM, CMV IgM, Toxoplasma IgM, ESR, CRP pending  -  cc/hr  - dental consult  - ENT consult    Rhabdo 2/2 Above  - CK 5941 -->10k  - UA positive for Blood, negative for RBC  -  cc/hr NS  - Consult nephrology pending    #Transaminitis  -  -> 138  - ALT 72 -> 71  - repeat LFTs  - RUQ US- Increased hepatic echogenicity compatible with hepatic steatosis or   hepatocellular disease.  - Consult GI pending    #Hyponatremia - resolving  - 125 -> 134 -->130  - IVF    #Misc  - DVT Prophylaxis: lovenox  - Diet: regular  - Activity: as tolerated; PT  - IV Fluids: NS 200cc/hr    Dispo: medicine

## 2022-09-22 NOTE — CONSULT NOTE ADULT - ASSESSMENT
IMPRESSION:  Right mouth swelling  Rhabdomyolysis  Sepsis on admission  Elevated liver enzymes      PLAN:    CNS: Avoid depressants    HEENT: CT shows Asymmetrically enlarged right submandibular and right anterior upper jugular lymph nodes  with associated. Assessed by ENT, no airway compromise. Dental consult.    PULMONARY:  HOB @ 45 degrees.  Aspiration precautions     CARDIOVASCULAR: Goal directed fluid management; s/p 4 L. Lactate 1.6.    GI: Feeding as tolerated. RUQ sono.    RENAL: NS at 200, titrate to achieve urine output at 200-300/hour. Follow up lytes. Correct as needed. Trend CK.    INFECTIOUS DISEASE: F/u cultures. Unasyn per ID and obtain EBV, CMV, toxoplasma IGM    HEMATOLOGICAL:  DVT prophylaxis: Lovenox    ENDOCRINE:  Follow up FS.  Insulin protocol if needed.    MUSCULOSKELETAL: Ambulate as tolerated.    DISPO: Patient does not need ICU level of care and can be monitored on the medical floor.       IMPRESSION:    Right mouth swelling  Rhabdomyolysis  Sepsis on admission  Elevated liver enzymes      PLAN:    CNS: Avoid depressants    HEENT: CT shows Asymmetrically enlarged right submandibular and right anterior upper jugular lymph nodes  with associated. Assessed by ENT, no airway compromise. Dental consult.    PULMONARY:  HOB @ 45 degrees.  Aspiration precautions.    CARDIOVASCULAR: Goal directed fluid management; s/p 4 L. Trend lactic acid. Continue IV fluids.     GI: Feeding as tolerated. Elevated AST likely related to rhabdo.     RENAL: NS at 200, titrate to achieve urine output at 200-300/hour. Follow up lytes. Correct as needed. Trend CK. Consider nephrology evaluation.     INFECTIOUS DISEASE: F/u cultures. Unasyn per ID and obtain EBV, CMV, toxoplasma IGM.     HEMATOLOGICAL:  DVT prophylaxis: Lovenox    ENDOCRINE:  Follow up FS.  Insulin protocol if needed.    MUSCULOSKELETAL: Ambulate as tolerated.    DISPO: Patient does not need ICU level of care at the moment and can be monitored on the medical floor.       IMPRESSION:    Right mouth swelling  Submandibular infection  Rhabdomyolysis  Sepsis on admission  Elevated liver enzymes      PLAN:    CNS: Avoid depressants    HEENT: CT shows Asymmetrically enlarged right submandibular and right anterior upper jugular lymph nodes  with associated. Assessed by ENT, no airway compromise. Dental consult.    PULMONARY:  HOB @ 45 degrees.  Aspiration precautions.    CARDIOVASCULAR: Goal directed fluid management; s/p 4 L. Trend lactic acid. Continue IV fluids. If remains hypotensive start low dose levophed.     GI: Feeding as tolerated. Elevated AST likely related to rhabdo. Trend LFTs. Hepatitis panel.    RENAL: NS at 200, titrate to achieve urine output at 200-300/hour. Follow up lytes. Correct as needed. Trend CK. Consider nephrology evaluation.     INFECTIOUS DISEASE: F/u cultures. Unasyn per ID and obtain EBV, CMV, toxoplasma IGM.     HEMATOLOGICAL:  DVT prophylaxis: Lovenox    ENDOCRINE:  Follow up FS.  Insulin protocol if needed.    MUSCULOSKELETAL: Ambulate as tolerated.    DISPO: He is s/p 5 L of fluids, remains borderline hypotensive, has fevers, chills and feels weak. He also has rhabdo and worsening CK level. Recommend monitoring in SDU.

## 2022-09-23 ENCOUNTER — RESULT REVIEW (OUTPATIENT)
Age: 22
End: 2022-09-23

## 2022-09-23 LAB
ALBUMIN SERPL ELPH-MCNC: 3.6 G/DL — SIGNIFICANT CHANGE UP (ref 3.5–5.2)
ALP SERPL-CCNC: 66 U/L — SIGNIFICANT CHANGE UP (ref 30–115)
ALT FLD-CCNC: 147 U/L — HIGH (ref 0–41)
ANION GAP SERPL CALC-SCNC: 12 MMOL/L — SIGNIFICANT CHANGE UP (ref 7–14)
ANION GAP SERPL CALC-SCNC: 15 MMOL/L — HIGH (ref 7–14)
ANISOCYTOSIS BLD QL: SIGNIFICANT CHANGE UP
AST SERPL-CCNC: 395 U/L — HIGH (ref 0–41)
BASOPHILS # BLD AUTO: 0 K/UL — SIGNIFICANT CHANGE UP (ref 0–0.2)
BASOPHILS NFR BLD AUTO: 0 % — SIGNIFICANT CHANGE UP (ref 0–1)
BILIRUB SERPL-MCNC: 0.5 MG/DL — SIGNIFICANT CHANGE UP (ref 0.2–1.2)
BUN SERPL-MCNC: 10 MG/DL — SIGNIFICANT CHANGE UP (ref 10–20)
BUN SERPL-MCNC: 10 MG/DL — SIGNIFICANT CHANGE UP (ref 10–20)
CALCIUM SERPL-MCNC: 7 MG/DL — LOW (ref 8.4–10.5)
CALCIUM SERPL-MCNC: 7.8 MG/DL — LOW (ref 8.4–10.5)
CHLORIDE SERPL-SCNC: 95 MMOL/L — LOW (ref 98–110)
CHLORIDE SERPL-SCNC: 98 MMOL/L — SIGNIFICANT CHANGE UP (ref 98–110)
CK SERPL-CCNC: HIGH U/L (ref 0–225)
CK SERPL-CCNC: HIGH U/L (ref 0–225)
CMV IGM FLD-ACNC: <8 AU/ML — SIGNIFICANT CHANGE UP
CMV IGM SERPL QL: NEGATIVE — SIGNIFICANT CHANGE UP
CO2 SERPL-SCNC: 16 MMOL/L — LOW (ref 17–32)
CO2 SERPL-SCNC: 17 MMOL/L — SIGNIFICANT CHANGE UP (ref 17–32)
CREAT SERPL-MCNC: 0.6 MG/DL — LOW (ref 0.7–1.5)
CREAT SERPL-MCNC: 0.8 MG/DL — SIGNIFICANT CHANGE UP (ref 0.7–1.5)
CULTURE RESULTS: NO GROWTH — SIGNIFICANT CHANGE UP
D DIMER BLD IA.RAPID-MCNC: 1026 NG/ML DDU — HIGH (ref 0–230)
EBV DNA SERPL NAA+PROBE-ACNC: SIGNIFICANT CHANGE UP IU/ML
EBV EA AB SER IA-ACNC: <5 U/ML — SIGNIFICANT CHANGE UP
EBV EA AB TITR SER IF: NEGATIVE — SIGNIFICANT CHANGE UP
EBV EA IGG SER-ACNC: NEGATIVE — SIGNIFICANT CHANGE UP
EBV NA IGG SER IA-ACNC: <3 U/ML — SIGNIFICANT CHANGE UP
EBV PATRN SPEC IB-IMP: SIGNIFICANT CHANGE UP
EBV VCA IGG AVIDITY SER QL IA: POSITIVE
EBV VCA IGM SER IA-ACNC: <10 U/ML — SIGNIFICANT CHANGE UP
EBV VCA IGM SER IA-ACNC: >750 U/ML — HIGH
EBV VCA IGM TITR FLD: NEGATIVE — SIGNIFICANT CHANGE UP
EBVPCR LOG: SIGNIFICANT CHANGE UP LOG10IU/ML
EGFR: 129 ML/MIN/1.73M2 — SIGNIFICANT CHANGE UP
EGFR: 141 ML/MIN/1.73M2 — SIGNIFICANT CHANGE UP
EOSINOPHIL # BLD AUTO: 0 K/UL — SIGNIFICANT CHANGE UP (ref 0–0.7)
EOSINOPHIL NFR BLD AUTO: 0 % — SIGNIFICANT CHANGE UP (ref 0–8)
FIBRINOGEN PPP-MCNC: >700 MG/DL — HIGH (ref 204.4–570.6)
GIANT PLATELETS BLD QL SMEAR: PRESENT — SIGNIFICANT CHANGE UP
GLUCOSE SERPL-MCNC: 116 MG/DL — HIGH (ref 70–99)
GLUCOSE SERPL-MCNC: 159 MG/DL — HIGH (ref 70–99)
HAV IGM SER-ACNC: SIGNIFICANT CHANGE UP
HBV CORE IGM SER-ACNC: SIGNIFICANT CHANGE UP
HBV SURFACE AG SER-ACNC: SIGNIFICANT CHANGE UP
HCT VFR BLD CALC: 41.4 % — LOW (ref 42–52)
HCV AB S/CO SERPL IA: 0.12 S/CO — SIGNIFICANT CHANGE UP (ref 0–0.99)
HCV AB SERPL-IMP: SIGNIFICANT CHANGE UP
HGB BLD-MCNC: 13.7 G/DL — LOW (ref 14–18)
IRON SATN MFR SERPL: 17 UG/DL — LOW (ref 35–150)
IRON SATN MFR SERPL: 8 % — LOW (ref 15–50)
LDH SERPL L TO P-CCNC: 1137 U/L — HIGH (ref 50–242)
LYMPHOCYTES # BLD AUTO: 0.59 K/UL — LOW (ref 1.2–3.4)
LYMPHOCYTES # BLD AUTO: 6.1 % — LOW (ref 20.5–51.1)
MAGNESIUM SERPL-MCNC: 1.9 MG/DL — SIGNIFICANT CHANGE UP (ref 1.8–2.4)
MAGNESIUM SERPL-MCNC: 2.1 MG/DL — SIGNIFICANT CHANGE UP (ref 1.8–2.4)
MANUAL SMEAR VERIFICATION: SIGNIFICANT CHANGE UP
MCHC RBC-ENTMCNC: 27.1 PG — SIGNIFICANT CHANGE UP (ref 27–31)
MCHC RBC-ENTMCNC: 33.1 G/DL — SIGNIFICANT CHANGE UP (ref 32–37)
MCV RBC AUTO: 82 FL — SIGNIFICANT CHANGE UP (ref 80–94)
MICROCYTES BLD QL: SIGNIFICANT CHANGE UP
MONOCYTES # BLD AUTO: 0.09 K/UL — LOW (ref 0.1–0.6)
MONOCYTES NFR BLD AUTO: 0.9 % — LOW (ref 1.7–9.3)
NEUTROPHILS # BLD AUTO: 8.96 K/UL — HIGH (ref 1.4–6.5)
NEUTROPHILS NFR BLD AUTO: 88.7 % — HIGH (ref 42.2–75.2)
NEUTS BAND # BLD: 4.3 % — SIGNIFICANT CHANGE UP (ref 0–6)
OSMOLALITY UR: 889 MOS/KG — SIGNIFICANT CHANGE UP (ref 50–1200)
PHOSPHATE SERPL-MCNC: 1.5 MG/DL — LOW (ref 2.1–4.9)
PLAT MORPH BLD: NORMAL — SIGNIFICANT CHANGE UP
PLATELET # BLD AUTO: 144 K/UL — SIGNIFICANT CHANGE UP (ref 130–400)
POIKILOCYTOSIS BLD QL AUTO: SIGNIFICANT CHANGE UP
POLYCHROMASIA BLD QL SMEAR: SLIGHT — SIGNIFICANT CHANGE UP
POTASSIUM SERPL-MCNC: 4 MMOL/L — SIGNIFICANT CHANGE UP (ref 3.5–5)
POTASSIUM SERPL-MCNC: 4.3 MMOL/L — SIGNIFICANT CHANGE UP (ref 3.5–5)
POTASSIUM SERPL-SCNC: 4 MMOL/L — SIGNIFICANT CHANGE UP (ref 3.5–5)
POTASSIUM SERPL-SCNC: 4.3 MMOL/L — SIGNIFICANT CHANGE UP (ref 3.5–5)
POTASSIUM UR-SCNC: 50 MMOL/L — SIGNIFICANT CHANGE UP
PROT SERPL-MCNC: 5.9 G/DL — LOW (ref 6–8)
RBC # BLD: 4.6 M/UL — LOW (ref 4.7–6.1)
RBC # BLD: 5.05 M/UL — SIGNIFICANT CHANGE UP (ref 4.7–6.1)
RBC # FLD: 13.3 % — SIGNIFICANT CHANGE UP (ref 11.5–14.5)
RBC BLD AUTO: NORMAL — SIGNIFICANT CHANGE UP
RETICS #: 41.9 K/UL — SIGNIFICANT CHANGE UP (ref 25–125)
RETICS/RBC NFR: 0.9 % — SIGNIFICANT CHANGE UP (ref 0.5–1.5)
SODIUM SERPL-SCNC: 123 MMOL/L — LOW (ref 135–146)
SODIUM SERPL-SCNC: 130 MMOL/L — LOW (ref 135–146)
SODIUM UR-SCNC: 65 MMOL/L — SIGNIFICANT CHANGE UP
SPECIMEN SOURCE: SIGNIFICANT CHANGE UP
T GONDII IGM SER QL: <3 AU/ML — SIGNIFICANT CHANGE UP
T GONDII IGM SER QL: NEGATIVE — SIGNIFICANT CHANGE UP
TIBC SERPL-MCNC: 204 UG/DL — LOW (ref 220–430)
UIBC SERPL-MCNC: 187 UG/DL — SIGNIFICANT CHANGE UP (ref 110–370)
WBC # BLD: 9.63 K/UL — SIGNIFICANT CHANGE UP (ref 4.8–10.8)
WBC # FLD AUTO: 9.63 K/UL — SIGNIFICANT CHANGE UP (ref 4.8–10.8)

## 2022-09-23 PROCEDURE — 99233 SBSQ HOSP IP/OBS HIGH 50: CPT

## 2022-09-23 PROCEDURE — 99222 1ST HOSP IP/OBS MODERATE 55: CPT

## 2022-09-23 PROCEDURE — 93970 EXTREMITY STUDY: CPT | Mod: 26

## 2022-09-23 RX ORDER — ACETAMINOPHEN 500 MG
650 TABLET ORAL EVERY 6 HOURS
Refills: 0 | Status: DISCONTINUED | OUTPATIENT
Start: 2022-09-23 | End: 2022-09-28

## 2022-09-23 RX ORDER — SODIUM CHLORIDE 9 MG/ML
500 INJECTION INTRAMUSCULAR; INTRAVENOUS; SUBCUTANEOUS ONCE
Refills: 0 | Status: COMPLETED | OUTPATIENT
Start: 2022-09-23 | End: 2022-09-23

## 2022-09-23 RX ADMIN — SODIUM CHLORIDE 200 MILLILITER(S): 9 INJECTION INTRAMUSCULAR; INTRAVENOUS; SUBCUTANEOUS at 01:38

## 2022-09-23 RX ADMIN — AMPICILLIN SODIUM AND SULBACTAM SODIUM 200 GRAM(S): 250; 125 INJECTION, POWDER, FOR SUSPENSION INTRAMUSCULAR; INTRAVENOUS at 11:23

## 2022-09-23 RX ADMIN — ONDANSETRON 4 MILLIGRAM(S): 8 TABLET, FILM COATED ORAL at 13:59

## 2022-09-23 RX ADMIN — AMPICILLIN SODIUM AND SULBACTAM SODIUM 200 GRAM(S): 250; 125 INJECTION, POWDER, FOR SUSPENSION INTRAMUSCULAR; INTRAVENOUS at 03:53

## 2022-09-23 RX ADMIN — Medication 650 MILLIGRAM(S): at 15:43

## 2022-09-23 RX ADMIN — AMPICILLIN SODIUM AND SULBACTAM SODIUM 200 GRAM(S): 250; 125 INJECTION, POWDER, FOR SUSPENSION INTRAMUSCULAR; INTRAVENOUS at 17:58

## 2022-09-23 RX ADMIN — AMPICILLIN SODIUM AND SULBACTAM SODIUM 200 GRAM(S): 250; 125 INJECTION, POWDER, FOR SUSPENSION INTRAMUSCULAR; INTRAVENOUS at 23:57

## 2022-09-23 RX ADMIN — Medication 650 MILLIGRAM(S): at 10:00

## 2022-09-23 RX ADMIN — ENOXAPARIN SODIUM 40 MILLIGRAM(S): 100 INJECTION SUBCUTANEOUS at 23:14

## 2022-09-23 RX ADMIN — AMPICILLIN SODIUM AND SULBACTAM SODIUM 200 GRAM(S): 250; 125 INJECTION, POWDER, FOR SUSPENSION INTRAMUSCULAR; INTRAVENOUS at 07:29

## 2022-09-23 RX ADMIN — Medication 650 MILLIGRAM(S): at 09:31

## 2022-09-23 RX ADMIN — Medication 650 MILLIGRAM(S): at 16:13

## 2022-09-23 RX ADMIN — SODIUM CHLORIDE 200 MILLILITER(S): 9 INJECTION INTRAMUSCULAR; INTRAVENOUS; SUBCUTANEOUS at 11:34

## 2022-09-23 RX ADMIN — SODIUM CHLORIDE 6000 MILLILITER(S): 9 INJECTION INTRAMUSCULAR; INTRAVENOUS; SUBCUTANEOUS at 11:34

## 2022-09-23 NOTE — CONSULT NOTE ADULT - ASSESSMENT
21 yo M with no significant PMHx presents to the ED c/o R sided lower dental pain x 1 week with development of fever/diffuse myalgias x 3 days. Pt had gone work on Monday in his usual state of health. When he came home that night he had begun complaining of aches and feeling unwell. Over the past few days, his dental pain has gotten worse. He also endorses having fever as high as 103.  pt was found to have elevated liver enzymes and elevated CK level.     # Transaminitis likely secondary to mild Rhabdo  # Hepatic steatosis     RUQ sono with increased hepatic echogenicity compatible with hepatic steatosis or hepatocellular disease.  Hep A, B, C serologies negative  EBV IgM, CMV IgM, Toxoplasma IgM pending    RECOMMENDATIONS  - trend LFTs   - IV hydration   - avoid hepatotoxic medications   - infection treatment per ID   - avoid alcohol       21 yo M with no significant PMHx presents to the ED c/o R sided lower dental pain x 1 week with development of fever/diffuse myalgias x 3 days. Pt had gone work on Monday in his usual state of health. When he came home that night he had begun complaining of aches and feeling unwell. Over the past few days, his dental pain has gotten worse. He also endorses having fever as high as 103.  pt was found to have elevated liver enzymes and elevated CK level.     # Transaminitis likely secondary to mild Rhabdo  # Hepatic steatosis     RUQ sono with increased hepatic echogenicity compatible with hepatic steatosis or hepatocellular disease.  Hep A, B, C serologies negative  EBV IgM, CMV IgM, Toxoplasma IgM pending    RECOMMENDATIONS  - trend LFTs   - IV hydration   - avoid hepatotoxic medications   - infection treatment per ID   - avoid alcohol   - follow up with hepatology clinic in two weeks after discharge

## 2022-09-23 NOTE — CHART NOTE - NSCHARTNOTEFT_GEN_A_CORE
Spoke w pt alone using  [phone]  Pt states last time he had shaking episodes like this was when he had COVID, never before that or after that until now  Initially denied EtOH use in entire life. Explained to pt that it is important he answers all questions honestly, as we need to be able to treat him appropriately.   Stated he has had EtOH in the past, has not had any in past 3 years, last drink in [place] 3 years ago  Denied any drug use including cocaine, marijuana, heroin. Also does not use any other non prescribed medications such as antidepressants or opiates.   Currently endorses some SOB when exerting self but not while at rest. SaO2 while laying down high 90s.   When asked about UOP, states he voided 11am today but has not voided bladder since then (~6pm when I asked)  RN to place fernando - explained to pt using  service that the fernando will be placed as accurate I&Os are important to document to help guide medical management  Pt understood and agreeable.

## 2022-09-23 NOTE — CONSULT NOTE ADULT - SUBJECTIVE AND OBJECTIVE BOX
-------------------------------------------------------------------------------------------------------------------------------------------------------------------------------  HEPATOLOGY INITIAL CONSULT  -------------------------------------------------------------------------------------------------------------------------------------------------------------------------------    HPI:  23 yo M with no significant PMHx presents to the ED c/o R sided lower dental pain x 1 week with development of fever/diffuse myalgias x 3 days. Pt had gone work on Monday in his usual state of health. When he came home that night he had begun complaining of aches and feeling unwell. Over the past few days, his dental pain has gotten worse. He also endorses having fever as high as 103.  pt was found to have elevated liver enzymes and elevated CK level       Outpatient GI Provider:    PAST MEDICAL & SURGICAL HISTORY:  No pertinent past medical history      No significant past surgical history      Review of Systems: Negative except as per HPI.    MEDICATIONS  (STANDING):  ampicillin/sulbactam  IVPB 3 Gram(s) IV Intermittent every 6 hours  enoxaparin Injectable 40 milliGRAM(s) SubCutaneous every 24 hours  sodium chloride 0.9%. 1000 milliLiter(s) (200 mL/Hr) IV Continuous <Continuous>    MEDICATIONS  (PRN):  acetaminophen     Tablet .. 650 milliGRAM(s) Oral every 6 hours PRN Temp greater or equal to 38C (100.4F), Mild Pain (1 - 3)  aluminum hydroxide/magnesium hydroxide/simethicone Suspension 30 milliLiter(s) Oral every 4 hours PRN Dyspepsia  melatonin 3 milliGRAM(s) Oral at bedtime PRN Insomnia  ondansetron Injectable 4 milliGRAM(s) IV Push every 8 hours PRN Nausea and/or Vomiting      ALLERGIES:      SOCIAL HISTORY:  no history of substance use     FAMILY HISTORY:  no FH of GI cancers     Vital Signs Last 24 Hrs  T(C): 35.9 (23 Sep 2022 07:00), Max: 40.9 (22 Sep 2022 09:33)  T(F): 96.6 (23 Sep 2022 07:00), Max: 105.6 (22 Sep 2022 09:33)  HR: 132 (23 Sep 2022 07:00) (108 - 140)  BP: 101/66 (23 Sep 2022 03:51) (94/55 - 121/61)  BP(mean): 76 (23 Sep 2022 03:51) (76 - 86)  RR: 12 (23 Sep 2022 03:51) (12 - 22)  SpO2: 98% (23 Sep 2022 07:00) (97% - 99%)    Parameters below as of 23 Sep 2022 03:51  Patient On (Oxygen Delivery Method): room air    PHYSICAL EXAM:  Constitutional: no acute distress  Eyes: no icterus  Neck: no masses, no LAD  Respiratory: normal inspiratory effort; no wheezing or crackles  Cardiovascular: RRR, normal S1/S2, no murmurs/rubs/gallops  Gastrointestinal: soft, nondistended, nontender,   Extremities: no LE edema  Neurological: AAOx3, no asterixis  Skin: no rashes, bruises, petechiae    LABS:                        13.7   9.63  )-----------( 144      ( 23 Sep 2022 05:57 )             41.4     09-23    130<L>  |  98  |  10  ----------------------------<  116<H>  4.3   |  17  |  0.8    Ca    7.8<L>      23 Sep 2022 05:57  Mg     2.1     09-23    TPro  5.9<L>  /  Alb  3.6  /  TBili  0.5  /  DBili  x   /  AST  395<H>  /  ALT  147<H>  /  AlkPhos  66  09-23    PT/INR - ( 21 Sep 2022 22:31 )   PT: 14.30 sec;   INR: 1.25 ratio         PTT - ( 21 Sep 2022 22:31 )  PTT:31.7 sec  LIVER FUNCTIONS - ( 23 Sep 2022 05:57 )  Alb: 3.6 g/dL / Pro: 5.9 g/dL / ALK PHOS: 66 U/L / ALT: 147 U/L / AST: 395 U/L / GGT: x             RADIOLOGY & ADDITIONAL STUDIES:    < from: US Abdomen Upper Quadrant Right (09.22.22 @ 11:08) >  Increased hepatic echogenicity compatible with hepatic steatosis or   hepatocellular disease.    < end of copied text >

## 2022-09-23 NOTE — PROGRESS NOTE ADULT - SUBJECTIVE AND OBJECTIVE BOX
Pt seen and examined at bedside. No CP or SOB. Febrile in the evening           PAST MEDICAL & SURGICAL HISTORY:  No pertinent past medical history    No significant past surgical history        VITAL SIGNS (Last 24 hrs):  T(C): 37.7 (09-23-22 @ 12:05), Max: 39.7 (09-22-22 @ 16:42)  HR: 133 (09-23-22 @ 12:05) (108 - 135)  BP: 109/76 (09-23-22 @ 08:00) (94/55 - 109/76)  RR: 12 (09-23-22 @ 03:51) (12 - 22)  SpO2: 98% (09-23-22 @ 12:05) (97% - 99%)  Wt(kg): --  Daily     Daily     I&O's Summary    22 Sep 2022 07:01  -  23 Sep 2022 07:00  --------------------------------------------------------  IN: 1660 mL / OUT: 0 mL / NET: 1660 mL    23 Sep 2022 07:01  -  23 Sep 2022 12:36  --------------------------------------------------------  IN: 1700 mL / OUT: 0 mL / NET: 1700 mL        PHYSICAL EXAM:  GENERAL: NAD, well-developed  HEAD:  Atraumatic, Normocephalic  EYES: EOMI, PERRLA, conjunctiva and sclera clear  NECK: Supple, No JVD, right cheek and neck swelling   CHEST/LUNG: Clear to auscultation bilaterally; No wheeze  HEART: Regular rate and rhythm; No murmurs, rubs, or gallops  ABDOMEN: Soft, Nontender, Nondistended; Bowel sounds present  EXTREMITIES:  2+ Peripheral Pulses, No clubbing, cyanosis, or edema  PSYCH: AAOx3  NEUROLOGY: non-focal  SKIN: No rashes or lesions    Labs Reviewed  Spoke to patient in regards to abnormal labs.    CBC Full  -  ( 23 Sep 2022 05:57 )  WBC Count : 9.63 K/uL  Hemoglobin : 13.7 g/dL  Hematocrit : 41.4 %  Platelet Count - Automated : 144 K/uL  Mean Cell Volume : 82.0 fL  Mean Cell Hemoglobin : 27.1 pg  Mean Cell Hemoglobin Concentration : 33.1 g/dL  Auto Neutrophil # : 8.96 K/uL  Auto Lymphocyte # : 0.59 K/uL  Auto Monocyte # : 0.09 K/uL  Auto Eosinophil # : 0.00 K/uL  Auto Basophil # : 0.00 K/uL  Auto Neutrophil % : 88.7 %  Auto Lymphocyte % : 6.1 %  Auto Monocyte % : 0.9 %  Auto Eosinophil % : 0.0 %  Auto Basophil % : 0.0 %    BMP:    09-23 @ 05:57    Blood Urea Nitrogen - 10  Calcium - 7.8  Carbond Dioxide - 17  Chloride - 98  Creatinine - 0.8  Glucose - 116  Potassium - 4.3  Sodium - 130      Hemoglobin A1c -   PT/INR - ( 21 Sep 2022 22:31 )   PT: 14.30 sec;   INR: 1.25 ratio         PTT - ( 21 Sep 2022 22:31 )  PTT:31.7 sec  Urine Culture:  09-21 @ 21:10 Urine culture: --    Culture Results:   No growth to date.  Method Type: --  Organism: --  Organism Identification: --  Specimen Source: .Blood Blood-Peripheral        COVID Labs  CRP:  151.9 (09-22-22)      D-Dimer:            Imaging reviewed independently and reviewed read  < from: US Abdomen Upper Quadrant Right (09.22.22 @ 11:08) >    IMPRESSION:    Increased hepatic echogenicity compatible with hepatic steatosis or   hepatocellular disease.    < end of copied text >      < from: CT Neck Soft Tissue w/ IV Cont (09.21.22 @ 22:06) >  Impression:      Asymmetrically enlarged right submandibular and right anterior upper   jugular lymph nodes that maintain reniform appearance. Associated   adjacent soft tissue changes without drainable fluid collection. Etiology   includes infectious/inflammatory. Recommend follow-up imaging such as   neck sonography within 1 month to follow for resolution.    < end of copied text >      MEDICATIONS  (STANDING):  ampicillin/sulbactam  IVPB 3 Gram(s) IV Intermittent every 6 hours  enoxaparin Injectable 40 milliGRAM(s) SubCutaneous every 24 hours  sodium chloride 0.9%. 1000 milliLiter(s) (200 mL/Hr) IV Continuous <Continuous>    MEDICATIONS  (PRN):  acetaminophen     Tablet .. 650 milliGRAM(s) Oral every 6 hours PRN Temp greater or equal to 38C (100.4F), Mild Pain (1 - 3)  aluminum hydroxide/magnesium hydroxide/simethicone Suspension 30 milliLiter(s) Oral every 4 hours PRN Dyspepsia  melatonin 3 milliGRAM(s) Oral at bedtime PRN Insomnia  ondansetron Injectable 4 milliGRAM(s) IV Push every 8 hours PRN Nausea and/or Vomiting

## 2022-09-23 NOTE — PROGRESS NOTE ADULT - ASSESSMENT
ASSESSMENT & PLAN:  21 yo M with no significant PMHx presents to the ED c/o R sided lower dental pain x 1 week with development of fever/diffuse myalgias x 3 days. Pt has no recent dental work, no trauma, no known sick contacts, no pets or animal contact. Pt's father reports he has not had any vaccinations.  CT showed enlarged right submandibular and right anterior upper jugular lymph nodes and associated adjacent soft tissue changes without drainable fluid collection.     #Sepsis (T>101, pulse >90, febrile)   #R Facial swelling and dental pain? Inflamed LN on CVT scan likely viral vs bacterial   - 1 week h/o right sided dental pain; 3 day h/o fever, diffuse myalgias  - Tmax at 105 (oral and rectal) in ED  - CT: enlarged right submandibular and right anterior upper jugular lymph nodes and associated adjacent soft tissue changes without drainable fluid collection.   - CXR - no acute pathology  - ID consulted appreciated  - f/u blood and urine cultures  - s/p zosyn in ED  - scontinue Unasyn 3gm q6h IVPB per ID recs  -  cc/hr  - ESR, CRP  - EBV IgM, CMV IgM, Toxoplasma IgM  - dental consult  - ENT consult appreciated   - continue SDU     #Rhabdo   - CK 5941-->21K  - repeat CK BID and CMP BID   - suspected rhadbo 2/2 sepsis/severe myalgias for past 3 days  -  cc/hr NS      #Transaminitis likely 2/2 Rhabdo   -  -> 138  - ALT 72 -> 71  - repeat LFTs  - RUQ US noted  - Hepatology consult appreciated     #Hyponatremia - resolving  - 125 -> 134  - IVF    #Progress Note Handoff  Pending (specify):  as above  Disposition: SDU

## 2022-09-23 NOTE — CONSULT NOTE ADULT - ASSESSMENT
22y y.o. M with no reported PMH presented to [facility] ED with CC of fever and body aches for a duration of a week. Clinical exam reveals no odontogenic cause of fevers or facial swelling. No acute surgical intervention as per OMFS    Recommendations:  - C/w antibiotics recommended by ID  - Appreciate ENT recs    - Page [phone] with any questions or concerns

## 2022-09-23 NOTE — CONSULT NOTE ADULT - ASSESSMENT
21 yo M with no significant PMHx presents to the ED c/o R sided lower dental pain x 1 week with development of fever/diffuse myalgias x 3 days. Pt had gone work on Monday in his usual state of health. When he came home that night he had begun complaining of aches and feeling unwell. He was found to have right jaw soft tissue infection, in severe sepsis and was started on abx. He also developed rhabdomyolysis and nephro was called for eval.    Assessment and plan  Rhabdomyolysis/ right jaw soft tissue infection/ severe sepsis/ transaminitis  CK trending up despite IVF  Kidney function at baseline, good UO  Worsening metabolic acidosis likely secondary to unstable hemodynamics and NS infusion   switch NS to LR at 200 cc/hr  Strict I/Os  Adjust medications to eGFR  continue to monitor CK  will follow 21 yo M with no significant PMHx presents to the ED c/o R sided lower dental pain x 1 week with development of fever/diffuse myalgias x 3 days. Pt had gone work on Monday in his usual state of health. When he came home that night he had begun complaining of aches and feeling unwell. He was found to have right jaw soft tissue infection, in severe sepsis and was started on abx. He also developed rhabdomyolysis and nephro was called for eval.    Assessment and plan    Rhabdomyolysis/ right jaw soft tissue infection/ severe sepsis/ transaminitis  CK trending up despite IVF  Kidney function at baseline, good UO  Worsening metabolic acidosis likely secondary to unstable hemodynamics and NS infusion   switch NS to LR at 200 cc/hr  Strict I/Os  Adjust medications to eGFR  continue to monitor CK  will follow

## 2022-09-23 NOTE — PROGRESS NOTE ADULT - ASSESSMENT
PROBLEMS  #Sepsis (T>101F, Pulse>90) with cervical lymphadenopathy; R/O fascial cellulitis  ampicillin/sulbactam  IVPB 3 Gram(s) IV Intermittent every 6 hours  Pt with less right fascial swelling; Tmax lower    CT neck with asymmetrically enlarged right submandibular and right anterior upper jugular lymph nodes that maintain reniform appearance. Associated adjacent soft tissue changes without drainable fluid collection. Etiology   includes infectious/inflammatory.  Cxray negative  9/21 B/C x2 negative  ESR 6, .9    New problem with additional W/U  acute illness with systemic symptoms    #Rhabdomyolysis  CPK 5941 to 21,504  #Hyponatremia Na 125 to 130  #Transaminitis worsening  RUQ sono with increased hepatic echogenicity compatible with hepatic steatosis or hepatocellular disease.  Hep A, B, C serologies negative  EBV IgM, CMV IgM, Toxoplasma IgM pending      PLAN  - Would check HIV Ab, Quantiferon, CAROLE, & Parvovirus IgM  - Continue Unasyn 3gm q6h IVPB  - Repeat sodium, wbc, CPK  - Dental consult  - Await EBV IgM, CMV IgM, Toxoplasma IgM

## 2022-09-23 NOTE — CONSULT NOTE ADULT - SUBJECTIVE AND OBJECTIVE BOX
Patient is a 22y old  Male who presents with a chief complaint of fever, facial swelling (23 Sep 2022 15:57)      HPI:  23 yo M with no significant PMHx presents to the ED c/o R sided lower dental pain x 1 week with development of fever/diffuse myalgias x 3 days. Pt had gone work on Monday in his usual state of health. When he came home that night he had begun complaining of aches and feeling unwell. Over the past few days, his dental pain has gotten worse. He also endorses having fever as high as 103. He reports the pain has made it difficult for him to walk; he also became nauseous when eating and he hasn't had anything but water for past two days. He denies any headaches, sob, chest pain, abd pain, diarrhea, constipation. Pt has no recent dental work, no trauma, no known sick contacts, no pets or animal contact. Pt's father reports he has not had any vaccinations.     Pt went to urgent care last night where he tested negative for covid and was given tylenol. He was told to come to the ED. In the ED, he was given a bolus of fluids, tylenol, and zosyn. CT showed enlarged right submandibular and right anterior upper jugular lymph nodes and associated adjacent soft tissue changes without drainable fluid collection.  (22 Sep 2022 08:22)      PAST MEDICAL & SURGICAL HISTORY:  No pertinent past medical history      No significant past surgical history          SOCIAL HX:   Smoking        Denies                  ETOH                            Other    FAMILY HISTORY:  :  No known cardiovacular family hisotry     Review Of Systems:     All ROS are negative except per HPI       Allergies    No Known Allergies    Intolerances          PHYSICAL EXAM    ICU Vital Signs Last 24 Hrs  T(C): 39.1 (23 Sep 2022 15:15), Max: 39.3 (22 Sep 2022 18:41)  T(F): 102.3 (23 Sep 2022 15:15), Max: 102.7 (22 Sep 2022 18:41)  HR: 145 (23 Sep 2022 16:00) (112 - 145)  BP: 111/76 (23 Sep 2022 16:00) (97/66 - 111/76)  BP(mean): 89 (23 Sep 2022 16:00) (76 - 89)  ABP: --  ABP(mean): --  RR: 18 (23 Sep 2022 15:15) (12 - 22)  SpO2: 97% (23 Sep 2022 16:00) (97% - 99%)    O2 Parameters below as of 23 Sep 2022 03:51  Patient On (Oxygen Delivery Method): room air            CONSTITUTIONAL:  Well nourished.   NAD    ENT:     Mouth with normal mucosa.   NO stridor     CARDIAC:   Tachycardic   Regular rhythm.    No edema        RESPIRATORY:   No wheezing  Bilateral BS   Not tachypneic,  No use of accessory muscles    GASTROINTESTINAL:  Abdomen soft,   Non-tender,   No guarding,   + BS      NEUROLOGICAL:   Alert and oriented   No motor deficits.    SKIN:   Skin normal color for race,   No evidence of rash.                  22 @ 07:01  -  22 @ 07:00  --------------------------------------------------------  IN:    IV PiggyBack: 100 mL    Oral Fluid: 360 mL    sodium chloride 0.9%: 1200 mL  Total IN: 1660 mL    OUT:  Total OUT: 0 mL    Total NET: 1660 mL      22 @ 07:01  -  22 @ 17:10  --------------------------------------------------------  IN:    sodium chloride 0.9%: 1200 mL    Sodium Chloride 0.9% Bolus: 500 mL  Total IN: 1700 mL    OUT:  Total OUT: 0 mL    Total NET: 1700 mL          LABS:                          13.7   9.63  )-----------( 144      ( 23 Sep 2022 05:57 )             41.4                                                   130<L>  |  98  |  10  ----------------------------<  116<H>  4.3   |  17  |  0.8    Ca    7.8<L>      23 Sep 2022 05:57  Mg     2.1         TPro  5.9<L>  /  Alb  3.6  /  TBili  0.5  /  DBili  x   /  AST  395<H>  /  ALT  147<H>  /  AlkPhos  66  09-23      PT/INR - ( 21 Sep 2022 22:31 )   PT: 14.30 sec;   INR: 1.25 ratio         PTT - ( 21 Sep 2022 22:31 )  PTT:31.7 sec                                       Urinalysis Basic - ( 22 Sep 2022 03:15 )    Color: Yellow / Appearance: Clear / S.039 / pH: x  Gluc: x / Ketone: Moderate  / Bili: Negative / Urobili: 3 mg/dL   Blood: x / Protein: 30 mg/dL / Nitrite: Negative   Leuk Esterase: Negative / RBC: 1 /HPF / WBC 1 /HPF   Sq Epi: x / Non Sq Epi: 2 /HPF / Bacteria: Negative        CARDIAC MARKERS ( 23 Sep 2022 05:57 )  x     / x     / 25114 U/L / x     / x      CARDIAC MARKERS ( 22 Sep 2022 17:54 )  x     / x     / 45974 U/L / x     / x      CARDIAC MARKERS ( 22 Sep 2022 11:38 )  x     / x     / 73349 U/L / x     / x      CARDIAC MARKERS ( 22 Sep 2022 04:45 )  x     / x     / 5941 U/L / x     / x                                                LIVER FUNCTIONS - ( 23 Sep 2022 05:57 )  Alb: 3.6 g/dL / Pro: 5.9 g/dL / ALK PHOS: 66 U/L / ALT: 147 U/L / AST: 395 U/L / GGT: x                                                  Culture - Urine (collected 22 Sep 2022 03:15)  Source: Clean Catch Clean Catch (Midstream)  Final Report (23 Sep 2022 14:50):    No growth    Culture - Blood (collected 21 Sep 2022 21:10)  Source: .Blood Blood-Peripheral  Preliminary Report (23 Sep 2022 02:04):    No growth to date.    Culture - Blood (collected 21 Sep 2022 21:10)  Source: .Blood Blood-Peripheral  Preliminary Report (23 Sep 2022 02:04):    No growth to date.                                                                                           X-Rays reviewed                                                                                     ECHO        MEDICATIONS  (STANDING):  ampicillin/sulbactam  IVPB 3 Gram(s) IV Intermittent every 6 hours  enoxaparin Injectable 40 milliGRAM(s) SubCutaneous every 24 hours  sodium chloride 0.9%. 1000 milliLiter(s) (200 mL/Hr) IV Continuous <Continuous>    MEDICATIONS  (PRN):  acetaminophen     Tablet .. 650 milliGRAM(s) Oral every 6 hours PRN Temp greater or equal to 38C (100.4F), Mild Pain (1 - 3)  aluminum hydroxide/magnesium hydroxide/simethicone Suspension 30 milliLiter(s) Oral every 4 hours PRN Dyspepsia  melatonin 3 milliGRAM(s) Oral at bedtime PRN Insomnia  ondansetron Injectable 4 milliGRAM(s) IV Push every 8 hours PRN Nausea and/or Vomiting

## 2022-09-23 NOTE — CONSULT NOTE ADULT - ASSESSMENT
IMPRESSION:    Sepsis POA  Submandibular infection / inflammation   Rhabdomyolysis     PLAN:    CNS:  No depressants.  UDS SDS     HEENT: Oral care.  Dental eval     PULMONARY:  HOB @ 45 degrees.  Aspiration precautions     CARDIOVASCULAR:  FU labs and adjust IVF.  ECHo.  TSH     GI: GI prophylaxis.  Feeding.  Bowel regimen.  RUQ sono     RENAL:  Follow up lytes.  Correct as needed.  Urine Myoglobin.  Frequent lyte checks.  Renal eval     INFECTIOUS DISEASE: Follow up cultures.  ABX per ID.  Hep panel     HEMATOLOGICAL:  DVT prophylaxis.  Dimer noted.  FU LE duplex.     ENDOCRINE:  Follow up FS.  Insulin protocol if needed.    MUSCULOSKELETAL:  Bed chair position.  RHeum screen.  Rheum eval     ICU monitoring for now     VIDAL Tariq

## 2022-09-23 NOTE — CHART NOTE - NSCHARTNOTEFT_GEN_A_CORE
9/21/22:  2150 - Tylenol 975 PO    9/22/22:  0833 - 105.4F rectal  0855 - Tylenol 975 PO  0933 - 105.6F rectal  0937 - Toradol 15 IV  1020 - 100.6F oral  1123 - 101.9F rectal  1548 - 101.1F rectal  1550 - Tylenol 650 PO  1642 - 103.5F rectal  1841 - 102.7F rectal  1917 - Ibuprofen 400 PO  2149 - 102.6F rectal  2301 - Tylenol 1000 IV    9/23/22:  0017 - 99.2F oral  0224 - 99.8F oral

## 2022-09-23 NOTE — CONSULT NOTE ADULT - SUBJECTIVE AND OBJECTIVE BOX
NEPHROLOGY CONSULTATION NOTE    23 yo M with no significant PMHx presents to the ED c/o R sided lower dental pain x 1 week with development of fever/diffuse myalgias x 3 days. Pt had gone work on Monday in his usual state of health. When he came home that night he had begun complaining of aches and feeling unwell. He was found to have right jaw soft tissue infection, in severe sepsis and was started on abx. He also developed rhabdomyolysis and nephro was called for eval.  Patient seen at bedside, still complaining of pain in his jaw.  VS showed persistent fever, tachycardia and borderline BP.    PAST MEDICAL & SURGICAL HISTORY:  No pertinent past medical history      No significant past surgical history        Allergies:  No Known Allergies    Home Medications Reviewed  Hospital Medications:   MEDICATIONS  (STANDING):  ampicillin/sulbactam  IVPB 3 Gram(s) IV Intermittent every 6 hours  enoxaparin Injectable 40 milliGRAM(s) SubCutaneous every 24 hours  sodium chloride 0.9% Bolus 500 milliLiter(s) IV Bolus once  sodium chloride 0.9%. 1000 milliLiter(s) (200 mL/Hr) IV Continuous <Continuous>    SOCIAL HISTORY:  Denies ETOH,Smoking,   FAMILY HISTORY:      REVIEW OF SYSTEMS:  CONSTITUTIONAL: fevers   EYES/ENT: No visual changes;  right jaw pain and swelling  NECK: No pain or stiffness  RESPIRATORY: No cough, wheezing, hemoptysis; No shortness of breath  CARDIOVASCULAR: palpitations  GASTROINTESTINAL: No abdominal or epigastric pain. No nausea, vomiting, or hematemesis; No diarrhea or constipation. No melena or hematochezia.  GENITOURINARY: No dysuria, frequency, foamy urine, urinary urgency, incontinence or hematuria  NEUROLOGICAL: No numbness or weakness  SKIN: No itching, burning, rashes, or lesions   VASCULAR: No bilateral lower extremity edema.   All other review of systems is negative unless indicated above.    VITALS:  Vital Signs Last 24 Hrs  T(C): 35.9 (23 Sep 2022 07:00), Max: 39.7 (22 Sep 2022 16:42)  T(F): 96.6 (23 Sep 2022 07:00), Max: 103.5 (22 Sep 2022 16:42)  HR: 135 (23 Sep 2022 08:00) (108 - 135)  BP: 109/76 (23 Sep 2022 08:00) (94/55 - 109/76)  BP(mean): 88 (23 Sep 2022 08:00) (76 - 88)  RR: 12 (23 Sep 2022 03:51) (12 - 22)  SpO2: 99% (23 Sep 2022 08:00) (97% - 99%)    Parameters below as of 23 Sep 2022 03:51  Patient On (Oxygen Delivery Method): room air         @ 07:01  -   @ 07:00  --------------------------------------------------------  IN: 1660 mL / OUT: 0 mL / NET: 1660 mL        PHYSICAL EXAM:  Constitutional: NAD  HEENT: right jaw swelling  Neck: No JVD  Respiratory: CTAB, no wheezes, rales or rhonchi  Cardiovascular: S1, S2, RRR  Gastrointestinal: BS+, soft, NT/ND  Extremities: No cyanosis or clubbing. No peripheral edema  Neurological: A/O x 3, no focal deficits  Psychiatric: Normal mood, normal affect  : No CVA tenderness. No fernando.   Skin: No rashes      LABS:      130<L>  |  98  |  10  ----------------------------<  116<H>  4.3   |  17  |  0.8    Ca    7.8<L>      23 Sep 2022 05:57  Mg     2.1         TPro  5.9<L>  /  Alb  3.6  /  TBili  0.5  /  DBili      /  AST  395<H>  /  ALT  147<H>  /  AlkPhos  66      Creatinine Trend: 0.8 <--, 0.8 <--, 0.9 <--, 1.0 <--                        13.7   9.63  )-----------( 144      ( 23 Sep 2022 05:57 )             41.4     Urine Studies:  Urinalysis Basic - ( 22 Sep 2022 03:15 )    Color: Yellow / Appearance: Clear / S.039 / pH:   Gluc:  / Ketone: Moderate  / Bili: Negative / Urobili: 3 mg/dL   Blood:  / Protein: 30 mg/dL / Nitrite: Negative   Leuk Esterase: Negative / RBC: 1 /HPF / WBC 1 /HPF   Sq Epi:  / Non Sq Epi: 2 /HPF / Bacteria: Negative                           NEPHROLOGY CONSULTATION NOTE    23 yo M with no significant PMHx presents to the ED c/o R sided lower dental pain x 1 week with development of fever/diffuse myalgias x 3 days. Pt had gone work on Monday in his usual state of health. When he came home that night he had begun complaining of aches and feeling unwell. He was found to have right jaw soft tissue infection, in severe sepsis and was started on abx. He also developed rhabdomyolysis and nephro was called for eval.  Patient seen at bedside, still complaining of pain in his jaw.  VS showed persistent fever, tachycardia and borderline BP.    PAST MEDICAL & SURGICAL HISTORY:  No pertinent past medical history  No significant past surgical history        Allergies:  No Known Allergies    Home Medications Reviewed  Hospital Medications:   MEDICATIONS  (STANDING):  ampicillin/sulbactam  IVPB 3 Gram(s) IV Intermittent every 6 hours  enoxaparin Injectable 40 milliGRAM(s) SubCutaneous every 24 hours  sodium chloride 0.9% Bolus 500 milliLiter(s) IV Bolus once  sodium chloride 0.9%. 1000 milliLiter(s) (200 mL/Hr) IV Continuous <Continuous>    SOCIAL HISTORY:  Denies ETOH,Smoking,   FAMILY HISTORY:      REVIEW OF SYSTEMS:  CONSTITUTIONAL: fevers   EYES/ENT: No visual changes;  right jaw pain and swelling  NECK: No pain or stiffness  RESPIRATORY: No cough, wheezing, hemoptysis; No shortness of breath  CARDIOVASCULAR: palpitations  GASTROINTESTINAL: No abdominal or epigastric pain. No nausea, vomiting, or hematemesis; No diarrhea or constipation. No melena or hematochezia.  GENITOURINARY: No dysuria, frequency, foamy urine, urinary urgency, incontinence or hematuria  NEUROLOGICAL: No numbness or weakness  SKIN: No itching, burning, rashes, or lesions   VASCULAR: No bilateral lower extremity edema.   All other review of systems is negative unless indicated above.    VITALS:  Vital Signs Last 24 Hrs  T(C): 35.9 (23 Sep 2022 07:00), Max: 39.7 (22 Sep 2022 16:42)  T(F): 96.6 (23 Sep 2022 07:00), Max: 103.5 (22 Sep 2022 16:42)  HR: 135 (23 Sep 2022 08:00) (108 - 135)  BP: 109/76 (23 Sep 2022 08:00) (94/55 - 109/76)  BP(mean): 88 (23 Sep 2022 08:00) (76 - 88)  RR: 12 (23 Sep 2022 03:51) (12 - 22)  SpO2: 99% (23 Sep 2022 08:00) (97% - 99%)    Parameters below as of 23 Sep 2022 03:51  Patient On (Oxygen Delivery Method): room air         @ 07:01  -   @ 07:00  --------------------------------------------------------  IN: 1660 mL / OUT: 0 mL / NET: 1660 mL        PHYSICAL EXAM:  Constitutional: NAD  HEENT: right jaw swelling  Neck: No JVD  Respiratory: CTAB, no wheezes, rales or rhonchi  Cardiovascular: S1, S2, RRR  Gastrointestinal: BS+, soft, NT/ND  Extremities: No cyanosis or clubbing. No peripheral edema  Neurological: A/O x 3, no focal deficits  Psychiatric: Normal mood, normal affect  : No CVA tenderness. No fernando.   Skin: No rashes      LABS:      130<L>  |  98  |  10  ----------------------------<  116<H>  4.3   |  17  |  0.8    Ca    7.8<L>      23 Sep 2022 05:57  Mg     2.1         TPro  5.9<L>  /  Alb  3.6  /  TBili  0.5  /  DBili      /  AST  395<H>  /  ALT  147<H>  /  AlkPhos  66      Creatinine Trend: 0.8 <--, 0.8 <--, 0.9 <--, 1.0 <--                        13.7   9.63  )-----------( 144      ( 23 Sep 2022 05:57 )             41.4     Creatine Kinase, Serum: 49076 U/L (22 @ 05:57)      Urine Studies:  Urinalysis Basic - ( 22 Sep 2022 03:15 )    Color: Yellow / Appearance: Clear / S.039 / pH:   Gluc:  / Ketone: Moderate  / Bili: Negative / Urobili: 3 mg/dL   Blood:  / Protein: 30 mg/dL / Nitrite: Negative   Leuk Esterase: Negative / RBC: 1 /HPF / WBC 1 /HPF   Sq Epi:  / Non Sq Epi: 2 /HPF / Bacteria: Negative

## 2022-09-23 NOTE — CONSULT NOTE ADULT - SUBJECTIVE AND OBJECTIVE BOX
22y y.o. M with no reported PMH presented to [facility] ED with CC of fever and body aches for a duration of a week. As per patient's brother, the patient had right facial swelling which has since improved. Patient denies dental pain and any hx of intra-oral pain. Endorses having fevers and chills, but denies N.V.SOB or trouble swallowing. OMFS was consulted to r/o odontogenic cause of fevers and facial swelling.      PMH: Denies  Medications: Denies  No pertinent family history in first degree relatives  No pertinent past medical history  No significant past surgical history          Exam:    Vitals: T(C): 39.1 (09-23-22 @ 15:15), Max: 39.7 (09-22-22 @ 16:42)  HR: 133 (09-23-22 @ 12:05) (112 - 135)  BP: 109/69 (09-23-22 @ 15:15) (94/55 - 109/76)  RR: 18 (09-23-22 @ 15:15) (12 - 22)  SpO2: 98% (09-23-22 @ 12:05) (98% - 99%)    General: calm, resting in bed, NAD  Neuro: AAOX3.  Head: NCAT. No presence of trauma.   Ears: Bilateral shape and symmetry  Eyes: Bilateral PERRLA, Bilateral EOMI, no proptosis, no bony steps noted, no racoon signs, no diplopia, no periorbital edema , no subconjuctival hemorrhage.    Nose: symmetry, no lacerations/abrasion, no tenderness, no discharge, no hematoma of septum, no crepitus, no mobility or deformities  Face: Trigeminal nerve (V1, V2, V3) intact bilaterally, Facial nerve (VII) intact bilaterally  Oral:  EOE: Presence of mild edema around the right cheek spanning to the R commissure. Inferior border of the mandible palpable bilaterally. Bilateral submandibular and submental region soft and nontender. Neck soft and supple.   IOE: Dentition intact. No presence of carious or fractured teeth. No presence of acute infections or edema intra-orally. FOM soft and nontender. Tongue full range mobility, uvula in the midline, no false point of mandible, maxilla intact, LOLY about 35mm, no trismus. WNL, occlusion reproducible, no deviation on opening,   Neck: no masses, full range of motion, no lacerations, no lymphadenopathy    CT Max Face:  Impression:  Asymmetrically enlarged right submandibular and right anterior upper jugular lymph nodes that maintain reniform appearance. Associated adjacent soft tissue changes without drainable fluid collection. Etiology includes infections/inflammatory. Recommend follow-up imaging such as neck sonography within 1 month to follow for resolution.    Labs:                        13.7   9.63  )-----------( 144      ( 23 Sep 2022 05:57 )             41.4   09-23    130<L>  |  98  |  10  ----------------------------<  116<H>  4.3   |  17  |  0.8    Ca    7.8<L>      23 Sep 2022 05:57  Mg     2.1     09-23    TPro  5.9<L>  /  Alb  3.6  /  TBili  0.5  /  DBili  x   /  AST  395<H>  /  ALT  147<H>  /  AlkPhos  66  09-23

## 2022-09-23 NOTE — CHART NOTE - NSCHARTNOTEFT_GEN_A_CORE
Transfer Note    Transfer from: Step down (SICU)  Transfer to: ICU     Hospital Course:   23 y/o man with no significant PMHx presented to the ED c/o R sided lower dental pain x 1 week with development of fever/diffuse myalgias x 3 days. Patient has had R sided jaw swelling for three weeks and has become worse which lead him to come to the ER. Pt had gone work that Monday in his [employer]. When he came home that night he had begun complaining of aches and feeling unwell. Over the following days, his dental pain has gotten worse. He also endorses having fever as high as 103. He reports the pain has made it difficult for him to walk; he also became nauseous when eating and he hasn't had anything but water for past two days. He denies any headaches, sob, chest pain, abd pain, diarrhea, constipation. Pt has no recent dental work, no trauma, no known sick contacts, no pets or animal contact. Pt's father reports he has not had any vaccinations.     Pt went to urgent care night before presentation where he tested negative for COVID and was given tylenol. He was told to present to the ED. In the ED, he was given a bolus of fluids, tylenol, and zosyn. CT showed enlarged right submandibular and right anterior upper jugular lymph nodes and associated adjacent soft tissue changes without drainable fluid collection.    Interim Events:   Sign out from day team 22: Pt pending labs CMV/EBV, has been having fevers as high as 105, also found w CK 21,000 - potentially from shaking/rigoring during fevers. Fever has been managed w Tylenol, no cooling blankets. If CK increased, consider increasing IVF rate to 300. Received call from ENT: no clear evidence of infection, no appreciable swelling intraorally or of neck/mandible though pt anatomy makes this somewhat challenging. No tooth pain w percussion.     OVERNIGHT PENDINGS:   - results from 4pm BMP, CK (received by lab)   - CT PE protocol r/o PE   - Duplex BLE   - UDS (received by lab)     MEDICATIONS:  STANDING MEDICATIONS  ampicillin/sulbactam  IVPB 3 Gram(s) IV Intermittent every 6 hours  enoxaparin Injectable 40 milliGRAM(s) SubCutaneous every 24 hours  sodium chloride 0.9%. 1000 milliLiter(s) IV Continuous <Continuous>    PRN MEDICATIONS  acetaminophen     Tablet .. 650 milliGRAM(s) Oral every 6 hours PRN  aluminum hydroxide/magnesium hydroxide/simethicone Suspension 30 milliLiter(s) Oral every 4 hours PRN  melatonin 3 milliGRAM(s) Oral at bedtime PRN  ondansetron Injectable 4 milliGRAM(s) IV Push every 8 hours PRN      VITAL SIGNS: Last 24 Hours  T(C): 39.1 (23 Sep 2022 15:15), Max: 39.2 (22 Sep 2022 21:49)  T(F): 102.3 (23 Sep 2022 15:15), Max: 102.6 (22 Sep 2022 21:49)  HR: 141 (23 Sep 2022 19:00) (112 - 145)  BP: 114/69 (23 Sep 2022 19:00) (97/66 - 114/69)  BP(mean): 86 (23 Sep 2022 19:00) (76 - 89)  RR: 18 (23 Sep 2022 15:15) (12 - 20)  SpO2: 95% (23 Sep 2022 19:00) (95% - 99%)    LABS:                        13.7   9.63  )-----------( 144      ( 23 Sep 2022 05:57 )             41.4     09-23    123<L>  |  95<L>  |  10  ----------------------------<  159<H>  4.0   |  16<L>  |  0.6<L>    Ca    7.0<L>      23 Sep 2022 17:59  Phos  1.5       Mg     1.9         TPro  5.9<L>  /  Alb  3.6  /  TBili  0.5  /  DBili  x   /  AST  395<H>  /  ALT  147<H>  /  AlkPhos  66  09-23    PT/INR - ( 21 Sep 2022 22:31 )   PT: 14.30 sec;   INR: 1.25 ratio         PTT - ( 21 Sep 2022 22:31 )  PTT:31.7 sec  Urinalysis Basic - ( 22 Sep 2022 03:15 )    Color: Yellow / Appearance: Clear / S.039 / pH: x  Gluc: x / Ketone: Moderate  / Bili: Negative / Urobili: 3 mg/dL   Blood: x / Protein: 30 mg/dL / Nitrite: Negative   Leuk Esterase: Negative / RBC: 1 /HPF / WBC 1 /HPF   Sq Epi: x / Non Sq Epi: 2 /HPF / Bacteria: Negative          Culture - Urine (collected 22 Sep 2022 03:15)  Source: Clean Catch Clean Catch (Midstream)  Final Report (23 Sep 2022 14:50):    No growth    Culture - Blood (collected 21 Sep 2022 21:10)  Source: .Blood Blood-Peripheral  Preliminary Report (23 Sep 2022 02:04):    No growth to date.    Culture - Blood (collected 21 Sep 2022 21:10)  Source: .Blood Blood-Peripheral  Preliminary Report (23 Sep 2022 02:04):    No growth to date.      CARDIAC MARKERS ( 23 Sep 2022 05:57 )  x     / x     / 10816 U/L / x     / x      CARDIAC MARKERS ( 22 Sep 2022 17:54 )  x     / x     / 33080 U/L / x     / x      CARDIAC MARKERS ( 22 Sep 2022 11:38 )  x     / x     / 05849 U/L / x     / x      CARDIAC MARKERS ( 22 Sep 2022 04:45 )  x     / x     / 5941 U/L / x     / x          ASSESSMENT & PLAN:   23 yo M with no significant PMHx presents to the ED c/o R sided lower dental pain x 1 week with development of fever/diffuse myalgias x 3 days. Pt has no recent dental work, no trauma, no known sick contacts, no pets or animal contact. Pt's father reports he has not had any vaccinations.  CT showed enlarged right submandibular and right anterior upper jugular lymph nodes and associated adjacent soft tissue changes without drainable fluid collection.     #Sepsis (T>101, pulse >90, febrile)   #R Facial swelling and dental pain? Inflamed LN on CVT scan likely viral vs bacterial   1 week h/o right sided dental pain; 3 day h/o fever, diffuse myalgias  Tmax at 105 (oral and rectal) in ED  CT: enlarged right submandibular and right anterior upper jugular lymph nodes and associated adjacent soft tissue changes without drainable fluid collection.   - CXR - no acute pathology  - ID consulted appreciated  - f/u blood and urine cultures  - s/p zosyn in ED  - scontinue Unasyn 3gm q6h IVPB per ID recs  -  cc/hr  - ESR, CRP  - EBV IgM, CMV IgM, Toxoplasma IgM  - dental consult  - ENT consult appreciated   - continue SDU     #Rhabdo   - CK 5941-->21K  - repeat CK BID and CMP BID   - suspected rhadbo 2/2 sepsis/severe myalgias for past 3 days  -  cc/hr NS      #Transaminitis likely 2/2 Rhabdo   -  -> 138  - ALT 72 -> 71  - repeat LFTs  - RUQ US noted  - Hepatology consult appreciated     #Hyponatremia - resolving  - 125 -> 134  - IVF Transfer Note    Transfer from: Step down (SICU)  Transfer to: ICU     Hospital Course:   21 y/o man with no significant PMHx presented to the ED c/o R sided lower dental pain x 1 week with development of fever/diffuse myalgias x 3 days. Patient has had R sided jaw swelling for three weeks and has become worse which lead him to come to the ER. Pt had gone work that Monday in his [employer]. When he came home that night he had begun complaining of aches and feeling unwell. Over the following days, his dental pain has gotten worse. He also endorses having fever as high as 103. He reports the pain has made it difficult for him to walk; he also became nauseous when eating and he hasn't had anything but water for past two days. He denies any headaches, sob, chest pain, abd pain, diarrhea, constipation. Pt has no recent dental work, no trauma, no known sick contacts, no pets or animal contact. Pt's father reports he has not had any vaccinations.     Pt went to urgent care night before presentation where he tested negative for COVID and was given tylenol. He was told to present to the ED. In the ED, he was given a bolus of fluids, tylenol, and zosyn. CT showed enlarged right submandibular and right anterior upper jugular lymph nodes and associated adjacent soft tissue changes without drainable fluid collection.    Interim Events:   Sign out from day team 22: Pt pending labs CMV/EBV, has been having fevers as high as 105, also found w CK 21,000 - potentially from shaking/rigoring during fevers. Fever has been managed w Tylenol, no cooling blankets. If CK increased, consider increasing IVF rate to 300. Received call from ENT: no clear evidence of infection, no appreciable swelling intraorally or of neck/mandible though pt anatomy makes this somewhat challenging. No tooth pain w percussion.     OVERNIGHT PENDINGS:   - results from 4pm BMP, CK (received by lab)   - CT PE protocol r/o PE   - Duplex BLE   - UDS (received by lab)     MEDICATIONS:  STANDING MEDICATIONS  ampicillin/sulbactam  IVPB 3 Gram(s) IV Intermittent every 6 hours  enoxaparin Injectable 40 milliGRAM(s) SubCutaneous every 24 hours  sodium chloride 0.9%. 1000 milliLiter(s) IV Continuous <Continuous>    PRN MEDICATIONS  acetaminophen     Tablet .. 650 milliGRAM(s) Oral every 6 hours PRN  aluminum hydroxide/magnesium hydroxide/simethicone Suspension 30 milliLiter(s) Oral every 4 hours PRN  melatonin 3 milliGRAM(s) Oral at bedtime PRN  ondansetron Injectable 4 milliGRAM(s) IV Push every 8 hours PRN      VITAL SIGNS: Last 24 Hours  T(C): 39.1 (23 Sep 2022 15:15), Max: 39.2 (22 Sep 2022 21:49)  T(F): 102.3 (23 Sep 2022 15:15), Max: 102.6 (22 Sep 2022 21:49)  HR: 141 (23 Sep 2022 19:00) (112 - 145)  BP: 114/69 (23 Sep 2022 19:00) (97/66 - 114/69)  BP(mean): 86 (23 Sep 2022 19:00) (76 - 89)  RR: 18 (23 Sep 2022 15:15) (12 - 20)  SpO2: 95% (23 Sep 2022 19:00) (95% - 99%)    LABS:                        13.7   9.63  )-----------( 144      ( 23 Sep 2022 05:57 )             41.4     09-23    123<L>  |  95<L>  |  10  ----------------------------<  159<H>  4.0   |  16<L>  |  0.6<L>    Ca    7.0<L>      23 Sep 2022 17:59  Phos  1.5       Mg     1.9         TPro  5.9<L>  /  Alb  3.6  /  TBili  0.5  /  DBili  x   /  AST  395<H>  /  ALT  147<H>  /  AlkPhos  66  09-23    PT/INR - ( 21 Sep 2022 22:31 )   PT: 14.30 sec;   INR: 1.25 ratio         PTT - ( 21 Sep 2022 22:31 )  PTT:31.7 sec  Urinalysis Basic - ( 22 Sep 2022 03:15 )    Color: Yellow / Appearance: Clear / S.039 / pH: x  Gluc: x / Ketone: Moderate  / Bili: Negative / Urobili: 3 mg/dL   Blood: x / Protein: 30 mg/dL / Nitrite: Negative   Leuk Esterase: Negative / RBC: 1 /HPF / WBC 1 /HPF   Sq Epi: x / Non Sq Epi: 2 /HPF / Bacteria: Negative          Culture - Urine (collected 22 Sep 2022 03:15)  Source: Clean Catch Clean Catch (Midstream)  Final Report (23 Sep 2022 14:50):    No growth    Culture - Blood (collected 21 Sep 2022 21:10)  Source: .Blood Blood-Peripheral  Preliminary Report (23 Sep 2022 02:04):    No growth to date.    Culture - Blood (collected 21 Sep 2022 21:10)  Source: .Blood Blood-Peripheral  Preliminary Report (23 Sep 2022 02:04):    No growth to date.      CARDIAC MARKERS ( 23 Sep 2022 05:57 )  x     / x     / 10532 U/L / x     / x      CARDIAC MARKERS ( 22 Sep 2022 17:54 )  x     / x     / 83747 U/L / x     / x      CARDIAC MARKERS ( 22 Sep 2022 11:38 )  x     / x     / 74177 U/L / x     / x      CARDIAC MARKERS ( 22 Sep 2022 04:45 )  x     / x     / 5941 U/L / x     / x          ASSESSMENT & PLAN:   21 yo M with no significant PMHx presents to the ED c/o R sided lower dental pain x 1 week with development of fever/diffuse myalgias x 3 days. Pt has no recent dental work, no trauma, no known sick contacts, no pets or animal contact. Pt's father reports he has not had any vaccinations.  CT showed enlarged right submandibular and right anterior upper jugular lymph nodes and associated adjacent soft tissue changes without drainable fluid collection.     #Sepsis (T>101, pulse >90, febrile)   #R Facial swelling and dental pain? Inflamed LN on CVT scan likely viral vs bacterial   1 week h/o right sided dental pain; 3 day h/o fever, diffuse myalgias  Tmax at 105 (oral and rectal) in ED  CT: enlarged right submandibular and right anterior upper jugular lymph nodes and associated adjacent soft tissue changes without drainable fluid collection.   - CXR - no acute pathology  - ID consulted appreciated  - f/u blood and urine cultures  - s/p zosyn in ED  - continue Unasyn 3gm q6h IVPB per ID recs  -  cc/hr  - ESR, CRP  - EBV IgM, CMV IgM, Toxoplasma IgM  - dental consult  - ENT consult appreciated   - continue SDU     #Rhabdo   - CK 5941-->21K   - repeat CK BID and CMP BID   - suspected rhadbo 2/2 sepsis/severe myalgias for past 3 days  -  cc/hr NS; consider increasing rate if CK continues to uptrend       #Transaminitis likely 2/2 Rhabdo   -  -> 138  - ALT 72 -> 71  - repeat LFTs  - RUQ US noted  - Hepatology consult appreciated     #Hyponatremia - resolving  - 125 -> 134  - IVF

## 2022-09-23 NOTE — PROGRESS NOTE ADULT - SUBJECTIVE AND OBJECTIVE BOX
DEMETRIA LAZCANO  22y, Male  Allergy: No Known Allergies      CHIEF COMPLAINT: fever, facial swelling (22 Sep 2022 14:46)      INTERVAL EVENTS/HPI  - No acute events overnight  - T(F): , Max: 105.6 (22 @ 09:33)  - Denies any worsening symptoms  - Tolerating medication  - WBC Count: 9.63 K/uL (22 @ 05:57)      ROS  HEENT: Denies headache, rhinorrhea, sore throat, eye pain, fascial swelling  CV: Denies CP, palpitations  PULM: Denies SOB, cough  GI: Denies abdominal pain, diarrhea  : Denies dysuria, hematuria  MSK: Denies arthralgias  SKIN: Denies rash   NEURO: Denies paresthesias, weakness  PSYCH: Denies depression    FH non-contributory   Social Hx non-contributory    VITALS:  T(F): 99.8, Max: 105.6 (22 @ 09:33)  HR: 114  BP: 101/66  RR: 12Vital Signs Last 24 Hrs  T(C): 37.7 (23 Sep 2022 02:24), Max: 40.9 (22 Sep 2022 09:33)  T(F): 99.8 (23 Sep 2022 02:24), Max: 105.6 (22 Sep 2022 09:33)  HR: 114 (23 Sep 2022 03:51) (108 - 140)  BP: 101/66 (23 Sep 2022 03:51) (94/55 - 121/61)  BP(mean): 76 (23 Sep 2022 03:51) (76 - 86)  RR: 12 (23 Sep 2022 03:51) (12 - 22)  SpO2: 98% (23 Sep 2022 03:51) (97% - 99%)    Parameters below as of 23 Sep 2022 03:51  Patient On (Oxygen Delivery Method): room air        PHYSICAL EXAM:  HEENT: Normocephalic, atraumatic, right fascial swelling, decreased and less taut  Neck: supple, no lymphadenopathy  CV: Regular rate & regular rhythm  Lungs: decreased BS at bases, no fremitus  Abdomen: Soft, BS present  Ext: Warm, well perfused  Neuro: non focal, awake  Skin: no rash, no erythema      TESTS & MEASUREMENTS:                        13.7   9.63  )-----------( 144      ( 23 Sep 2022 05:57 )             41.4         130<L>  |  98  |  10  ----------------------------<  116<H>  4.3   |  17  |  0.8    Ca    7.8<L>      23 Sep 2022 05:57  Mg     2.1         TPro  5.9<L>  /  Alb  3.6  /  TBili  0.5  /  DBili  x   /  AST  395<H>  /  ALT  147<H>  /  AlkPhos  66        LIVER FUNCTIONS - ( 23 Sep 2022 05:57 )  Alb: 3.6 g/dL / Pro: 5.9 g/dL / ALK PHOS: 66 U/L / ALT: 147 U/L / AST: 395 U/L / GGT: x           Urinalysis Basic - ( 22 Sep 2022 03:15 )    Color: Yellow / Appearance: Clear / S.039 / pH: x  Gluc: x / Ketone: Moderate  / Bili: Negative / Urobili: 3 mg/dL   Blood: x / Protein: 30 mg/dL / Nitrite: Negative   Leuk Esterase: Negative / RBC: 1 /HPF / WBC 1 /HPF   Sq Epi: x / Non Sq Epi: 2 /HPF / Bacteria: Negative        Culture - Blood (collected 22 @ 21:10)  Source: .Blood Blood-Peripheral  Preliminary Report (22 @ 02:04):    No growth to date.    Culture - Blood (collected 22 @ 21:10)  Source: .Blood Blood-Peripheral  Preliminary Report (22 @ 02:04):    No growth to date.        Lactate, Blood: 1.6 mmol/L (22 @ 11:38)  Lactate, Blood: 1.5 mmol/L (22 @ 21:10)      INFECTIOUS DISEASES TESTING  Hepatitis B Surface Antigen: Nonreact (22 @ 11:38)  Hepatitis C Virus Interpretation: Nonreact (22 @ 11:38)      RADIOLOGY & ADDITIONAL TESTS:  I have personally reviewed the last Chest xray  CXR  Xray Chest 1 View- PORTABLE-Urgent:   ACC: [id number] EXAM:  XR CHEST PORTABLE URGENT 1V                          PROCEDURE DATE:  2022          INTERPRETATION:  Clinical History / Reason for exam: Sepsis    Comparison : Chest radiograph None.    Technique/Positioning: Single AP chest radiograph.    Findings:    Support devices: None.    Cardiac/mediastinum/hilum: Unremarkable.    Lung parenchyma/Pleura: No focal consolidation, effusion or pneumothorax.    Skeleton/soft tissues: Unremarkable.    Impression:    No radiographic evidence of acute cardiopulmonary disease.        --- End of Report ---            CARO ROBERT MD; Attending Radiologist  This document has been electronically signed. Sep 22 2022  8:10AM (22 @ 22:34)      CT      CARDIOLOGY TESTING  12 Lead ECG:   Ventricular Rate 116 BPM    Atrial Rate 116 BPM    P-R Interval 144 ms    QRS Duration 86 ms    Q-T Interval 328 ms    QTC Calculation(Bazett) 455 ms    P Axis 44 degrees    R Axis 104 degrees    T Axis 19 degrees    Diagnosis Line Sinus tachycardia  Rightward axis  Cannot rule out Inferior infarct , age undetermined  Abnormal ECG    Confirmed by Kiko Castro ([id number]) on 2022 10:17:30 AM (22 @ 21:42)      MEDICATIONS  ampicillin/sulbactam  IVPB 3  enoxaparin Injectable 40  sodium chloride 0.9%. 1000      ANTIBIOTICS:  ampicillin/sulbactam  IVPB 3 Gram(s) IV Intermittent every 6 hours      All available historical data has been reviewed

## 2022-09-24 ENCOUNTER — RESULT REVIEW (OUTPATIENT)
Age: 22
End: 2022-09-24

## 2022-09-24 ENCOUNTER — TRANSCRIPTION ENCOUNTER (OUTPATIENT)
Age: 22
End: 2022-09-24

## 2022-09-24 LAB
ALBUMIN SERPL ELPH-MCNC: 2.8 G/DL — LOW (ref 3.5–5.2)
ALBUMIN SERPL ELPH-MCNC: 2.8 G/DL — LOW (ref 3.5–5.2)
ALP SERPL-CCNC: 61 U/L — SIGNIFICANT CHANGE UP (ref 30–115)
ALP SERPL-CCNC: 61 U/L — SIGNIFICANT CHANGE UP (ref 30–115)
ALT FLD-CCNC: 232 U/L — HIGH (ref 0–41)
ALT FLD-CCNC: 266 U/L — HIGH (ref 0–41)
AMPHET UR-MCNC: NEGATIVE — SIGNIFICANT CHANGE UP
ANION GAP SERPL CALC-SCNC: 11 MMOL/L — SIGNIFICANT CHANGE UP (ref 7–14)
ANION GAP SERPL CALC-SCNC: 13 MMOL/L — SIGNIFICANT CHANGE UP (ref 7–14)
ANION GAP SERPL CALC-SCNC: 15 MMOL/L — HIGH (ref 7–14)
ANION GAP SERPL CALC-SCNC: 16 MMOL/L — HIGH (ref 7–14)
ANISOCYTOSIS BLD QL: SLIGHT — SIGNIFICANT CHANGE UP
APTT BLD: 27.8 SEC — SIGNIFICANT CHANGE UP (ref 27–39.2)
APTT BLD: 29.3 SEC — SIGNIFICANT CHANGE UP (ref 27–39.2)
AST SERPL-CCNC: 546 U/L — HIGH (ref 0–41)
AST SERPL-CCNC: 549 U/L — HIGH (ref 0–41)
B-OH-BUTYR SERPL-SCNC: 0.9 MMOL/L — HIGH
BARBITURATES UR SCN-MCNC: NEGATIVE — SIGNIFICANT CHANGE UP
BASOPHILS # BLD AUTO: 0 K/UL — SIGNIFICANT CHANGE UP (ref 0–0.2)
BASOPHILS NFR BLD AUTO: 0 % — SIGNIFICANT CHANGE UP (ref 0–1)
BENZODIAZ UR-MCNC: NEGATIVE — SIGNIFICANT CHANGE UP
BILIRUB SERPL-MCNC: 0.3 MG/DL — SIGNIFICANT CHANGE UP (ref 0.2–1.2)
BILIRUB SERPL-MCNC: 0.4 MG/DL — SIGNIFICANT CHANGE UP (ref 0.2–1.2)
BLD GP AB SCN SERPL QL: SIGNIFICANT CHANGE UP
BUN SERPL-MCNC: 10 MG/DL — SIGNIFICANT CHANGE UP (ref 10–20)
BUN SERPL-MCNC: 13 MG/DL — SIGNIFICANT CHANGE UP (ref 10–20)
CALCIUM SERPL-MCNC: 6.7 MG/DL — LOW (ref 8.4–10.5)
CALCIUM SERPL-MCNC: 6.9 MG/DL — LOW (ref 8.4–10.5)
CALCIUM SERPL-MCNC: 7 MG/DL — LOW (ref 8.4–10.5)
CALCIUM SERPL-MCNC: 7 MG/DL — LOW (ref 8.4–10.5)
CHLORIDE SERPL-SCNC: 100 MMOL/L — SIGNIFICANT CHANGE UP (ref 98–110)
CHLORIDE SERPL-SCNC: 90 MMOL/L — LOW (ref 98–110)
CHLORIDE SERPL-SCNC: 92 MMOL/L — LOW (ref 98–110)
CHLORIDE SERPL-SCNC: 94 MMOL/L — LOW (ref 98–110)
CK SERPL-CCNC: HIGH U/L (ref 0–225)
CK SERPL-CCNC: HIGH U/L (ref 0–225)
CO2 SERPL-SCNC: 12 MMOL/L — LOW (ref 17–32)
CO2 SERPL-SCNC: 15 MMOL/L — LOW (ref 17–32)
CO2 SERPL-SCNC: 15 MMOL/L — LOW (ref 17–32)
CO2 SERPL-SCNC: 16 MMOL/L — LOW (ref 17–32)
COCAINE METAB.OTHER UR-MCNC: NEGATIVE — SIGNIFICANT CHANGE UP
CREAT ?TM UR-MCNC: 104 MG/DL — SIGNIFICANT CHANGE UP
CREAT SERPL-MCNC: 0.6 MG/DL — LOW (ref 0.7–1.5)
CRP SERPL-MCNC: 148.3 MG/L — HIGH
DRUG SCREEN 1, URINE RESULT: SIGNIFICANT CHANGE UP
EGFR: 141 ML/MIN/1.73M2 — SIGNIFICANT CHANGE UP
EOSINOPHIL # BLD AUTO: 0.08 K/UL — SIGNIFICANT CHANGE UP (ref 0–0.7)
EOSINOPHIL NFR BLD AUTO: 0.9 % — SIGNIFICANT CHANGE UP (ref 0–8)
ERYTHROCYTE [SEDIMENTATION RATE] IN BLOOD: 22 MM/HR — HIGH (ref 0–10)
FENTANYL UR QL: NEGATIVE — SIGNIFICANT CHANGE UP
FERRITIN SERPL-MCNC: 758 NG/ML — HIGH (ref 30–400)
GAS PNL BLDA: SIGNIFICANT CHANGE UP
GIANT PLATELETS BLD QL SMEAR: PRESENT — SIGNIFICANT CHANGE UP
GLUCOSE BLDC GLUCOMTR-MCNC: 153 MG/DL — HIGH (ref 70–99)
GLUCOSE BLDC GLUCOMTR-MCNC: 154 MG/DL — HIGH (ref 70–99)
GLUCOSE BLDC GLUCOMTR-MCNC: 171 MG/DL — HIGH (ref 70–99)
GLUCOSE BLDC GLUCOMTR-MCNC: 97 MG/DL — SIGNIFICANT CHANGE UP (ref 70–99)
GLUCOSE SERPL-MCNC: 125 MG/DL — HIGH (ref 70–99)
GLUCOSE SERPL-MCNC: 131 MG/DL — HIGH (ref 70–99)
GLUCOSE SERPL-MCNC: 134 MG/DL — HIGH (ref 70–99)
GLUCOSE SERPL-MCNC: 177 MG/DL — HIGH (ref 70–99)
HAV IGM SER-ACNC: SIGNIFICANT CHANGE UP
HBV CORE IGM SER-ACNC: SIGNIFICANT CHANGE UP
HBV SURFACE AG SER-ACNC: SIGNIFICANT CHANGE UP
HCT VFR BLD CALC: 34.5 % — LOW (ref 42–52)
HCT VFR BLD CALC: 37.3 % — LOW (ref 42–52)
HCV AB S/CO SERPL IA: 0.11 S/CO — SIGNIFICANT CHANGE UP (ref 0–0.99)
HCV AB SERPL-IMP: SIGNIFICANT CHANGE UP
HGB BLD-MCNC: 11.8 G/DL — LOW (ref 14–18)
HGB BLD-MCNC: 12.9 G/DL — LOW (ref 14–18)
HIV 1+2 AB+HIV1 P24 AG SERPL QL IA: SIGNIFICANT CHANGE UP
INR BLD: 1.13 RATIO — SIGNIFICANT CHANGE UP (ref 0.65–1.3)
INR BLD: 1.13 RATIO — SIGNIFICANT CHANGE UP (ref 0.65–1.3)
LACTATE SERPL-SCNC: 1.8 MMOL/L — SIGNIFICANT CHANGE UP (ref 0.7–2)
LACTATE SERPL-SCNC: 3.3 MMOL/L — HIGH (ref 0.7–2)
LYMPHOCYTES # BLD AUTO: 1.26 K/UL — SIGNIFICANT CHANGE UP (ref 1.2–3.4)
LYMPHOCYTES # BLD AUTO: 15 % — LOW (ref 20.5–51.1)
MAGNESIUM SERPL-MCNC: 2.2 MG/DL — SIGNIFICANT CHANGE UP (ref 1.8–2.4)
MAGNESIUM SERPL-MCNC: 2.5 MG/DL — HIGH (ref 1.8–2.4)
MANUAL SMEAR VERIFICATION: SIGNIFICANT CHANGE UP
MCHC RBC-ENTMCNC: 26.9 PG — LOW (ref 27–31)
MCHC RBC-ENTMCNC: 27.4 PG — SIGNIFICANT CHANGE UP (ref 27–31)
MCHC RBC-ENTMCNC: 34.2 G/DL — SIGNIFICANT CHANGE UP (ref 32–37)
MCHC RBC-ENTMCNC: 34.6 G/DL — SIGNIFICANT CHANGE UP (ref 32–37)
MCV RBC AUTO: 78.8 FL — LOW (ref 80–94)
MCV RBC AUTO: 79.2 FL — LOW (ref 80–94)
METHADONE UR-MCNC: NEGATIVE — SIGNIFICANT CHANGE UP
MICROCYTES BLD QL: SLIGHT — SIGNIFICANT CHANGE UP
MONOCYTES # BLD AUTO: 0.29 K/UL — SIGNIFICANT CHANGE UP (ref 0.1–0.6)
MONOCYTES NFR BLD AUTO: 3.5 % — SIGNIFICANT CHANGE UP (ref 1.7–9.3)
NEUTROPHILS # BLD AUTO: 6.63 K/UL — HIGH (ref 1.4–6.5)
NEUTROPHILS NFR BLD AUTO: 73.5 % — SIGNIFICANT CHANGE UP (ref 42.2–75.2)
NEUTS BAND # BLD: 5.3 % — SIGNIFICANT CHANGE UP (ref 0–6)
NRBC # BLD: 0 /100 WBCS — SIGNIFICANT CHANGE UP (ref 0–0)
NRBC # BLD: 4 /100 — HIGH (ref 0–0)
NRBC # BLD: SIGNIFICANT CHANGE UP /100 WBCS (ref 0–0)
OPIATES UR-MCNC: NEGATIVE — SIGNIFICANT CHANGE UP
OSMOLALITY SERPL: 266 MOS/KG — LOW (ref 275–300)
OXYCODONE UR-MCNC: NEGATIVE — SIGNIFICANT CHANGE UP
PCP UR-MCNC: NEGATIVE — SIGNIFICANT CHANGE UP
PHOSPHATE SERPL-MCNC: 2.2 MG/DL — SIGNIFICANT CHANGE UP (ref 2.1–4.9)
PHOSPHATE SERPL-MCNC: 3.8 MG/DL — SIGNIFICANT CHANGE UP (ref 2.1–4.9)
PLAT MORPH BLD: NORMAL — SIGNIFICANT CHANGE UP
PLATELET # BLD AUTO: 138 K/UL — SIGNIFICANT CHANGE UP (ref 130–400)
PLATELET # BLD AUTO: 141 K/UL — SIGNIFICANT CHANGE UP (ref 130–400)
POIKILOCYTOSIS BLD QL AUTO: SIGNIFICANT CHANGE UP
POLYCHROMASIA BLD QL SMEAR: SLIGHT — SIGNIFICANT CHANGE UP
POTASSIUM SERPL-MCNC: 4.1 MMOL/L — SIGNIFICANT CHANGE UP (ref 3.5–5)
POTASSIUM SERPL-MCNC: 4.3 MMOL/L — SIGNIFICANT CHANGE UP (ref 3.5–5)
POTASSIUM SERPL-MCNC: 4.3 MMOL/L — SIGNIFICANT CHANGE UP (ref 3.5–5)
POTASSIUM SERPL-MCNC: 4.8 MMOL/L — SIGNIFICANT CHANGE UP (ref 3.5–5)
POTASSIUM SERPL-SCNC: 4.1 MMOL/L — SIGNIFICANT CHANGE UP (ref 3.5–5)
POTASSIUM SERPL-SCNC: 4.3 MMOL/L — SIGNIFICANT CHANGE UP (ref 3.5–5)
POTASSIUM SERPL-SCNC: 4.3 MMOL/L — SIGNIFICANT CHANGE UP (ref 3.5–5)
POTASSIUM SERPL-SCNC: 4.8 MMOL/L — SIGNIFICANT CHANGE UP (ref 3.5–5)
PROPOXYPHENE QUALITATIVE URINE RESULT: NEGATIVE — SIGNIFICANT CHANGE UP
PROT ?TM UR-MCNC: 85 MG/DLG/24H — SIGNIFICANT CHANGE UP
PROT SERPL-MCNC: 4.9 G/DL — LOW (ref 6–8)
PROT SERPL-MCNC: 5 G/DL — LOW (ref 6–8)
PROT/CREAT UR-RTO: 0.8 RATIO — HIGH (ref 0–0.2)
PROTHROM AB SERPL-ACNC: 13 SEC — HIGH (ref 9.95–12.87)
PROTHROM AB SERPL-ACNC: 13 SEC — HIGH (ref 9.95–12.87)
RBC # BLD: 4.38 M/UL — LOW (ref 4.7–6.1)
RBC # BLD: 4.71 M/UL — SIGNIFICANT CHANGE UP (ref 4.7–6.1)
RBC # FLD: 13.2 % — SIGNIFICANT CHANGE UP (ref 11.5–14.5)
RBC # FLD: 13.4 % — SIGNIFICANT CHANGE UP (ref 11.5–14.5)
RBC BLD AUTO: NORMAL — SIGNIFICANT CHANGE UP
SODIUM SERPL-SCNC: 120 MMOL/L — LOW (ref 135–146)
SODIUM SERPL-SCNC: 129 MMOL/L — LOW (ref 135–146)
THC UR QL: NEGATIVE — SIGNIFICANT CHANGE UP
TROPONIN T SERPL-MCNC: 0.84 NG/ML — CRITICAL HIGH
TROPONIN T SERPL-MCNC: 1.36 NG/ML — CRITICAL HIGH
TSH SERPL-MCNC: 1.11 UIU/ML — SIGNIFICANT CHANGE UP (ref 0.27–4.2)
UUN UR-MCNC: 1029 MG/DL — SIGNIFICANT CHANGE UP
VARIANT LYMPHS # BLD: 1.8 % — SIGNIFICANT CHANGE UP (ref 0–5)
WBC # BLD: 5.35 K/UL — SIGNIFICANT CHANGE UP (ref 4.8–10.8)
WBC # BLD: 8.41 K/UL — SIGNIFICANT CHANGE UP (ref 4.8–10.8)
WBC # FLD AUTO: 5.35 K/UL — SIGNIFICANT CHANGE UP (ref 4.8–10.8)
WBC # FLD AUTO: 8.41 K/UL — SIGNIFICANT CHANGE UP (ref 4.8–10.8)

## 2022-09-24 PROCEDURE — 93306 TTE W/DOPPLER COMPLETE: CPT | Mod: 26

## 2022-09-24 PROCEDURE — 88304 TISSUE EXAM BY PATHOLOGIST: CPT | Mod: 26

## 2022-09-24 PROCEDURE — 99291 CRITICAL CARE FIRST HOUR: CPT

## 2022-09-24 PROCEDURE — 99233 SBSQ HOSP IP/OBS HIGH 50: CPT

## 2022-09-24 PROCEDURE — 88305 TISSUE EXAM BY PATHOLOGIST: CPT | Mod: 26

## 2022-09-24 PROCEDURE — 88112 CYTOPATH CELL ENHANCE TECH: CPT | Mod: 26

## 2022-09-24 PROCEDURE — 33025 INCISION OF HEART SAC: CPT

## 2022-09-24 PROCEDURE — 71045 X-RAY EXAM CHEST 1 VIEW: CPT | Mod: 26

## 2022-09-24 PROCEDURE — 99222 1ST HOSP IP/OBS MODERATE 55: CPT

## 2022-09-24 PROCEDURE — 71275 CT ANGIOGRAPHY CHEST: CPT | Mod: 26

## 2022-09-24 RX ORDER — OXYCODONE HYDROCHLORIDE 5 MG/1
10 TABLET ORAL EVERY 4 HOURS
Refills: 0 | Status: DISCONTINUED | OUTPATIENT
Start: 2022-09-24 | End: 2022-09-28

## 2022-09-24 RX ORDER — POTASSIUM CHLORIDE 20 MEQ
20 PACKET (EA) ORAL ONCE
Refills: 0 | Status: DISCONTINUED | OUTPATIENT
Start: 2022-09-24 | End: 2022-09-24

## 2022-09-24 RX ORDER — HYDROMORPHONE HYDROCHLORIDE 2 MG/ML
0.5 INJECTION INTRAMUSCULAR; INTRAVENOUS; SUBCUTANEOUS EVERY 6 HOURS
Refills: 0 | Status: DISCONTINUED | OUTPATIENT
Start: 2022-09-24 | End: 2022-09-24

## 2022-09-24 RX ORDER — SODIUM CHLORIDE 9 MG/ML
1000 INJECTION INTRAMUSCULAR; INTRAVENOUS; SUBCUTANEOUS
Refills: 0 | Status: DISCONTINUED | OUTPATIENT
Start: 2022-09-24 | End: 2022-09-24

## 2022-09-24 RX ORDER — POTASSIUM CHLORIDE 20 MEQ
20 PACKET (EA) ORAL ONCE
Refills: 0 | Status: COMPLETED | OUTPATIENT
Start: 2022-09-24 | End: 2022-09-24

## 2022-09-24 RX ORDER — OXYCODONE HYDROCHLORIDE 5 MG/1
5 TABLET ORAL EVERY 4 HOURS
Refills: 0 | Status: DISCONTINUED | OUTPATIENT
Start: 2022-09-24 | End: 2022-09-28

## 2022-09-24 RX ORDER — SENNA PLUS 8.6 MG/1
2 TABLET ORAL AT BEDTIME
Refills: 0 | Status: DISCONTINUED | OUTPATIENT
Start: 2022-09-25 | End: 2022-09-25

## 2022-09-24 RX ORDER — SODIUM BICARBONATE 1 MEQ/ML
50 SYRINGE (ML) INTRAVENOUS ONCE
Refills: 0 | Status: COMPLETED | OUTPATIENT
Start: 2022-09-24 | End: 2022-09-24

## 2022-09-24 RX ORDER — CALCIUM CARBONATE 500(1250)
2 TABLET ORAL EVERY 8 HOURS
Refills: 0 | Status: DISCONTINUED | OUTPATIENT
Start: 2022-09-24 | End: 2022-09-28

## 2022-09-24 RX ORDER — PROPOFOL 10 MG/ML
30 INJECTION, EMULSION INTRAVENOUS
Qty: 1000 | Refills: 0 | Status: DISCONTINUED | OUTPATIENT
Start: 2022-09-24 | End: 2022-09-25

## 2022-09-24 RX ORDER — DEXTROSE 50 % IN WATER 50 %
50 SYRINGE (ML) INTRAVENOUS
Refills: 0 | Status: DISCONTINUED | OUTPATIENT
Start: 2022-09-24 | End: 2022-09-28

## 2022-09-24 RX ORDER — SODIUM CHLORIDE 9 MG/ML
1000 INJECTION INTRAMUSCULAR; INTRAVENOUS; SUBCUTANEOUS
Refills: 0 | Status: DISCONTINUED | OUTPATIENT
Start: 2022-09-24 | End: 2022-09-26

## 2022-09-24 RX ORDER — CHLORHEXIDINE GLUCONATE 213 G/1000ML
15 SOLUTION TOPICAL EVERY 12 HOURS
Refills: 0 | Status: DISCONTINUED | OUTPATIENT
Start: 2022-09-24 | End: 2022-09-25

## 2022-09-24 RX ORDER — CEFAZOLIN SODIUM 1 G
1000 VIAL (EA) INJECTION EVERY 8 HOURS
Refills: 0 | Status: COMPLETED | OUTPATIENT
Start: 2022-09-24 | End: 2022-09-25

## 2022-09-24 RX ORDER — SODIUM BICARBONATE 1 MEQ/ML
650 SYRINGE (ML) INTRAVENOUS EVERY 8 HOURS
Refills: 0 | Status: DISCONTINUED | OUTPATIENT
Start: 2022-09-24 | End: 2022-09-28

## 2022-09-24 RX ORDER — INSULIN HUMAN 100 [IU]/ML
10 INJECTION, SOLUTION SUBCUTANEOUS
Qty: 100 | Refills: 0 | Status: DISCONTINUED | OUTPATIENT
Start: 2022-09-24 | End: 2022-09-28

## 2022-09-24 RX ORDER — POLYETHYLENE GLYCOL 3350 17 G/17G
17 POWDER, FOR SOLUTION ORAL DAILY
Refills: 0 | Status: DISCONTINUED | OUTPATIENT
Start: 2022-09-25 | End: 2022-09-25

## 2022-09-24 RX ORDER — SODIUM CHLORIDE 9 MG/ML
1000 INJECTION, SOLUTION INTRAVENOUS
Refills: 0 | Status: DISCONTINUED | OUTPATIENT
Start: 2022-09-24 | End: 2022-09-24

## 2022-09-24 RX ORDER — DEXMEDETOMIDINE HYDROCHLORIDE IN 0.9% SODIUM CHLORIDE 4 UG/ML
1 INJECTION INTRAVENOUS
Qty: 200 | Refills: 0 | Status: DISCONTINUED | OUTPATIENT
Start: 2022-09-24 | End: 2022-09-25

## 2022-09-24 RX ORDER — CALCIUM GLUCONATE 100 MG/ML
2 VIAL (ML) INTRAVENOUS ONCE
Refills: 0 | Status: DISCONTINUED | OUTPATIENT
Start: 2022-09-24 | End: 2022-09-28

## 2022-09-24 RX ORDER — MEPERIDINE HYDROCHLORIDE 50 MG/ML
25 INJECTION INTRAMUSCULAR; INTRAVENOUS; SUBCUTANEOUS ONCE
Refills: 0 | Status: DISCONTINUED | OUTPATIENT
Start: 2022-09-24 | End: 2022-09-28

## 2022-09-24 RX ADMIN — Medication 60 MILLIGRAM(S): at 22:30

## 2022-09-24 RX ADMIN — Medication 20 MILLIEQUIVALENT(S): at 20:03

## 2022-09-24 RX ADMIN — Medication 2 TABLET(S): at 14:39

## 2022-09-24 RX ADMIN — SODIUM CHLORIDE 200 MILLILITER(S): 9 INJECTION INTRAMUSCULAR; INTRAVENOUS; SUBCUTANEOUS at 15:37

## 2022-09-24 RX ADMIN — Medication 650 MILLIGRAM(S): at 21:12

## 2022-09-24 RX ADMIN — Medication 85 MILLIMOLE(S): at 10:41

## 2022-09-24 RX ADMIN — Medication 60 MILLIGRAM(S): at 12:35

## 2022-09-24 RX ADMIN — AMPICILLIN SODIUM AND SULBACTAM SODIUM 200 GRAM(S): 250; 125 INJECTION, POWDER, FOR SUSPENSION INTRAMUSCULAR; INTRAVENOUS at 20:28

## 2022-09-24 RX ADMIN — Medication 50 MILLIEQUIVALENT(S): at 19:56

## 2022-09-24 RX ADMIN — Medication 650 MILLIGRAM(S): at 15:17

## 2022-09-24 RX ADMIN — AMPICILLIN SODIUM AND SULBACTAM SODIUM 200 GRAM(S): 250; 125 INJECTION, POWDER, FOR SUSPENSION INTRAMUSCULAR; INTRAVENOUS at 06:00

## 2022-09-24 RX ADMIN — INSULIN HUMAN 10 UNIT(S)/HR: 100 INJECTION, SOLUTION SUBCUTANEOUS at 21:12

## 2022-09-24 RX ADMIN — Medication 3 MILLIGRAM(S): at 00:50

## 2022-09-24 RX ADMIN — ENOXAPARIN SODIUM 40 MILLIGRAM(S): 100 INJECTION SUBCUTANEOUS at 22:03

## 2022-09-24 RX ADMIN — Medication 50 MILLIEQUIVALENT(S): at 20:01

## 2022-09-24 RX ADMIN — SODIUM CHLORIDE 100 MILLILITER(S): 9 INJECTION INTRAMUSCULAR; INTRAVENOUS; SUBCUTANEOUS at 12:34

## 2022-09-24 RX ADMIN — Medication 650 MILLIGRAM(S): at 00:50

## 2022-09-24 RX ADMIN — Medication 100 MILLIGRAM(S): at 21:13

## 2022-09-24 RX ADMIN — Medication 650 MILLIGRAM(S): at 13:35

## 2022-09-24 RX ADMIN — AMPICILLIN SODIUM AND SULBACTAM SODIUM 200 GRAM(S): 250; 125 INJECTION, POWDER, FOR SUSPENSION INTRAMUSCULAR; INTRAVENOUS at 11:05

## 2022-09-24 RX ADMIN — Medication 116 MILLIGRAM(S): at 14:39

## 2022-09-24 RX ADMIN — Medication 2 TABLET(S): at 21:14

## 2022-09-24 RX ADMIN — Medication 650 MILLIGRAM(S): at 14:39

## 2022-09-24 NOTE — PATIENT PROFILE ADULT - FALL HARM RISK - UNIVERSAL INTERVENTIONS
Bed in lowest position, wheels locked, appropriate side rails in place/Call bell, personal items and telephone in reach/Instruct patient to call for assistance before getting out of bed or chair/Non-slip footwear when patient is out of bed/[place] to call system/Physically safe environment - no spills, clutter or unnecessary equipment/Purposeful Proactive Rounding/Room/bathroom lighting operational, light cord in reach

## 2022-09-24 NOTE — PROGRESS NOTE ADULT - ATTENDING COMMENTS
events noted, febrile, tachycardic worsening CK despite IVF, chest ct Bl effusion, pericardial effusion, IP reviewed, ro myocarditis, worsening acidosis, on abx, possible polymyositis ( rheum called regarding steroids), IVF, echo (spoke with cardio), monitor reps status, trend CK, trop, CMP, MICU

## 2022-09-24 NOTE — CHART NOTE - NSCHARTNOTEFT_GEN_A_CORE
No evidence of acute pulmonary embolus.  Small bilateral pleural effusions.  Bilateral groundglass and patchy opacities and interlobular septal   thickening, likely on the basis of pulmonary edema.  Pericardial effusion.  # check official reading of echo   # cardiology evaluation   # check CAROLE, DNA, C3, C4. ANTI kevin   # Rheumatology evaluation   # upgrade to ccu   # hold iv fluids   #? steroids pulse   #start sodium bicarbonate 1300 po q 8 / follow calcium closely / follow lytes q 4 h   # start calcium  carbonate 2 tablets q 8   # case discussed with team   # will follow

## 2022-09-24 NOTE — BRIEF OPERATIVE NOTE - COMMENTS
Burak Delatorre participated as the first assistant during the entire procedure in the absence of a qualified MD or fellow.

## 2022-09-24 NOTE — PROGRESS NOTE ADULT - TIME BILLING
sepsis
time spent on review of labs, imaging studies, old records, obtaining history, personally examining patient, counselling and communicating with patient, entering orders for medications/tests/etc, discussions with other health care providers, documentation in electronic health records, independent interpretation of labs, imaging/procedure results and care coordination.
time spent on review of labs, imaging studies, old records, obtaining history, personally examining patient, counselling and communicating with patient/ family, entering orders for medications/tests/etc, discussions with other health care providers, documentation in electronic health records, independent interpretation of labs, imaging/procedure results and care coordination.
I have personally seen and examined this patient.    I have reviewed all pertinent clinical information and reviewed all relevant imaging and diagnostic studies personally.   I counseled the patient about diagnostic testing and treatment plan. All questions were answered.   I discussed recommendations with the primary team.

## 2022-09-24 NOTE — CONSULT NOTE ADULT - SUBJECTIVE AND OBJECTIVE BOX
HPI:  21 yo M with no significant PMHx presents to the ED c/o R sided lower dental pain x 1 week with development of fever/diffuse myalgias x 3 days. Pt had gone work on Monday in his usual state of health. When he came home that night he had begun complaining of aches and feeling unwell. Over the past few days, his dental pain has gotten worse. He also endorses having fever as high as 103. He reports the pain has made it difficult for him to walk; he also became nauseous when eating and he hasn't had anything but water for past two days. He denies any headaches, sob, chest pain, abd pain, diarrhea, constipation. Pt has no recent dental work, no trauma, no known sick contacts, no pets or animal contact. Pt's father reports he has not had any vaccinations.     Pt went to urgent care last night where he tested negative for covid and was given tylenol. He was told to come to the ED. In the ED, he was given a bolus of fluids, tylenol, and zosyn. CT showed enlarged right submandibular and right anterior upper jugular lymph nodes and associated adjacent soft tissue changes without drainable fluid collection.  (22 Sep 2022 08:22)  Pt admitted for fever, right jaw pain, elevated CPK getting worked up for myositis vs inflammatory/autoimmune conditions and possible vasculitis. Cardiothoracic consulted for pericardial effusion.     PAST MEDICAL & SURGICAL HISTORY  No pertinent past medical history    No significant past surgical history        FAMILY HISTORY:  FAMILY HISTORY:      SOCIAL HISTORY:  Social History:  Denies alcohol use, smoking  Lives with family at home  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)      ALLERGIES:  No Known Allergies      MEDICATIONS:  ampicillin/sulbactam  IVPB 3 Gram(s) IV Intermittent every 6 hours  calcium carbonate    500 mG (Tums) Chewable 2 Tablet(s) Chew every 8 hours  enoxaparin Injectable 40 milliGRAM(s) SubCutaneous every 24 hours  sodium bicarbonate 650 milliGRAM(s) Oral every 8 hours  sodium chloride 0.9%. 1000 milliLiter(s) (200 mL/Hr) IV Continuous <Continuous>    PRN:  acetaminophen     Tablet .. 650 milliGRAM(s) Oral every 6 hours PRN  aluminum hydroxide/magnesium hydroxide/simethicone Suspension 30 milliLiter(s) Oral every 4 hours PRN  melatonin 3 milliGRAM(s) Oral at bedtime PRN  ondansetron Injectable 4 milliGRAM(s) IV Push every 8 hours PRN      HOME MEDICATIONS:  Home Medications:      VITALS:   T(F): 100.8 (09-24 @ 13:00), Max: 105.6 (09-22 @ 09:33)  HR: 128 (09-24 @ 14:00) (68 - 145)  BP: 128/55 (09-24 @ 14:00) (84/56 - 142/98)  BP(mean): 86 (09-24 @ 14:00) (65 - 103)  RR: 18 (09-23 @ 15:15) (12 - 22)  SpO2: 96% (09-24 @ 14:00) (91% - 100%)    I&O's Summary    23 Sep 2022 07:01  -  24 Sep 2022 07:00  --------------------------------------------------------  IN: 5100 mL / OUT: 1525 mL / NET: 3575 mL    24 Sep 2022 07:01  -  24 Sep 2022 14:49  --------------------------------------------------------  IN: 918 mL / OUT: 925 mL / NET: -7 mL        REVIEW OF SYSTEMS:  CONSTITUTIONAL: Fever with generalized weakness  HEENT: No visual changes. Right Jaw pain  RESPIRATORY: No cough  CARDIOVASCULAR: See HPI  GASTROINTESTINAL: No abdominal pain. No nausea, vomiting, diarrhea   GENITOURINARY: No dysuria, frequency or hematuria  NEUROLOGICAL: No new focal deficits  SKIN: No new rashes    PHYSICAL EXAM:  General: tachypneic  HEENT: EOMI  Cardio: regular, S1, S2, muffled heart sounds  Pulm: decreased air entry b/l bases  Abdomen: Soft, non-tender, non-distended. Normoactive bowel sounds  Extremities: No edema b/l le  Neuro: A&O x3. No focal deficits    LABS:                        12.9   8.41  )-----------( 138      ( 24 Sep 2022 05:35 )             37.3     09-24    120<L>  |  94<L>  |  10  ----------------------------<  131<H>  4.3   |  15<L>  |  0.6<L>    Ca    6.7<L>      24 Sep 2022 13:51  Phos  2.2     09-24  Mg     2.2     09-24    TPro  5.0<L>  /  Alb  2.8<L>  /  TBili  0.3  /  DBili  x   /  AST  546<H>  /  ALT  232<H>  /  AlkPhos  61  09-24      Sedimentation Rate, Erythrocyte: 22 mm/Hr *H* (09-24-22 @ 11:34)  Lactate, Blood: 3.3 mmol/L *H* (09-24-22 @ 11:34)  Creatine Kinase, Serum: 31136 U/L *H* (09-24-22 @ 05:35)  Creatine Kinase, Serum: 60271 U/L *H* (09-23-22 @ 17:59)    CARDIAC MARKERS ( 24 Sep 2022 05:35 )  x     / x     / 85691 U/L / x     / x      CARDIAC MARKERS ( 23 Sep 2022 17:59 )  x     / x     / 72427 U/L / x     / x      CARDIAC MARKERS ( 23 Sep 2022 05:57 )  x     / x     / 22319 U/L / x     / x      CARDIAC MARKERS ( 22 Sep 2022 17:54 )  x     / x     / 54845 U/L / x     / x            Troponin trend:            RADIOLOGY:  -CXR: < from: CT Angio Chest PE Protocol w/ IV Cont (09.24.22 @ 00:53) >  IMPRESSION:    No evidence of acute pulmonary embolus.    Small bilateral pleural effusions.    Bilateral groundglass and patchy opacities and interlobular septal   thickening, likely on the basis of pulmonary edema.    Pericardial effusion.    < end of copied text >    -TTE: N/A    ECG:  Sinus Tachycardia possible inferior infarct    TELEMETRY EVENTS: SInus Tach

## 2022-09-24 NOTE — PROGRESS NOTE ADULT - SUBJECTIVE AND OBJECTIVE BOX
DEMETRIA LAZCANO  22y, Male  Allergy: No Known Allergies      LOS  2d    CHIEF COMPLAINT: fever, facial swelling (24 Sep 2022 07:06)      INTERVAL EVENTS/HPI  - No acute events overnight  - T(F): , Max: 102.3 (09-23-22 @ 15:15)  - remains febrile   - WBC Count: 8.41 (09-24-22 @ 05:35)  WBC Count: 9.63 (09-23-22 @ 05:57)     - Creatinine, Serum: 0.6 (09-24-22 @ 05:35)  Creatinine, Serum: 0.6 (09-23-22 @ 17:59)       ROS  General: Denies rigors, nightsweats  HEENT: Denies headache, rhinorrhea, sore throat, eye pain  CV: Denies CP, palpitations  PULM: Denies wheezing, hemoptysis  GI: Denies hematemesis, hematochezia, melena  : Denies discharge, hematuria  MSK: Denies arthralgias, myalgias  SKIN: Denies rash, lesions  NEURO: Denies paresthesias, weakness  PSYCH: Denies depression, anxiety    VITALS:  T(F): 98.5, Max: 102.3 (09-23-22 @ 15:15)  HR: 130  BP: 104/73  RR: 18Vital Signs Last 24 Hrs  T(C): 36.9 (24 Sep 2022 08:00), Max: 39.1 (23 Sep 2022 15:15)  T(F): 98.5 (24 Sep 2022 08:00), Max: 102.3 (23 Sep 2022 15:15)  HR: 130 (24 Sep 2022 08:00) (120 - 145)  BP: 104/73 (24 Sep 2022 08:00) (84/56 - 117/53)  BP(mean): 82 (24 Sep 2022 08:00) (65 - 93)  RR: 18 (23 Sep 2022 15:15) (18 - 18)  SpO2: 96% (24 Sep 2022 08:00) (91% - 98%)    Parameters below as of 24 Sep 2022 08:00  Patient On (Oxygen Delivery Method): room air        PHYSICAL EXAM:  Gen: NAD, resting in bed  HEENT: Normocephalic, atraumatic  Neck: supple, no lymphadenopathy  CV: Regular rate & regular rhythm  Lungs: decreased BS at bases, no fremitus  Abdomen: Soft, BS present  Ext: Warm, well perfused  Neuro: non focal, awake  Skin: no rash, no erythema  Lines: no phlebitis    FH: Non-contributory  Social Hx: Non-contributory    TESTS & MEASUREMENTS:                        12.9   8.41  )-----------( 138      ( 24 Sep 2022 05:35 )             37.3     09-24    120<L>  |  92<L>  |  10  ----------------------------<  125<H>  4.1   |  12<L>  |  0.6<L>    Ca    7.0<L>      24 Sep 2022 05:35  Phos  1.5     09-23  Mg     1.9     09-23    TPro  5.0<L>  /  Alb  2.8<L>  /  TBili  0.3  /  DBili  x   /  AST  546<H>  /  ALT  232<H>  /  AlkPhos  61  09-24      LIVER FUNCTIONS - ( 24 Sep 2022 05:35 )  Alb: 2.8 g/dL / Pro: 5.0 g/dL / ALK PHOS: 61 U/L / ALT: 232 U/L / AST: 546 U/L / GGT: x               Culture - Urine (collected 09-22-22 @ 03:15)  Source: Clean Catch Clean Catch (Midstream)  Final Report (09-23-22 @ 14:50):    No growth    Culture - Blood (collected 09-21-22 @ 21:10)  Source: .Blood Blood-Peripheral  Preliminary Report (09-23-22 @ 02:04):    No growth to date.    Culture - Blood (collected 09-21-22 @ 21:10)  Source: .Blood Blood-Peripheral  Preliminary Report (09-23-22 @ 02:04):    No growth to date.        Lactate, Blood: 1.6 mmol/L (09-22-22 @ 11:38)  Lactate, Blood: 1.5 mmol/L (09-21-22 @ 21:10)      INFECTIOUS DISEASES TESTING  HIV-1/2 Combo Result: Nonreact (09-23-22 @ 11:49)      INFLAMMATORY MARKERS  Sedimentation Rate, Erythrocyte: 6 mm/Hr (09-22-22 @ 11:38)  C-Reactive Protein, Serum: 151.9 mg/L (09-22-22 @ 11:38)      RADIOLOGY & ADDITIONAL TESTS:  I have personally reviewed the last available Chest xray  CXR  Xray Chest 1 View- PORTABLE-Urgent:   ACC: [id number] EXAM:  XR CHEST PORTABLE URGENT 1V                          PROCEDURE DATE:  09/21/2022          INTERPRETATION:  Clinical History / Reason for exam: Sepsis    Comparison : Chest radiograph None.    Technique/Positioning: Single AP chest radiograph.    Findings:    Support devices: None.    Cardiac/mediastinum/hilum: Unremarkable.    Lung parenchyma/Pleura: No focal consolidation, effusion or pneumothorax.    Skeleton/soft tissues: Unremarkable.    Impression:    No radiographic evidence of acute cardiopulmonary disease.        --- End of Report ---            CARO ROBERT MD; Attending Radiologist  This document has been electronically signed. Sep 22 2022  8:10AM (09-21-22 @ 22:34)      CT  CT Angio Chest PE Protocol w/ IV Cont:   ACC: [id number] EXAM:  CT ANGIO CHEST PULM [facility]                          PROCEDURE DATE:  09/24/2022          INTERPRETATION:  CLINICAL HISTORY/REASON FOR EXAM: Shortness of breath.    TECHNIQUE: Multislice helical sections were obtained from the thoracic   inlet to the lung bases during rapid administration of intravenous   contrast. Thin sections were reconstructed through the pulmonary   vasculature. Study was performed as CT angiogram. 3D (MIP) reformats   obtained.    COMPARISON: None.      FINDINGS:    PULMONARY EMBOLUS: No pulmonary emboli.    LUNGS, PLEURA, AIRWAYS: Small bilateral pleural effusions. Bilateral   groundglass and patchy opacities with interlobular septal thickening. No   pneumothorax. No evidence of central endobronchial obstruction.    THORACIC NODES: No mediastinal, hilar, supraclavicular, or axillary   lymphadenopathy.    MEDIASTINUM/GREAT VESSELS: A pericardial effusion is present measuring up   to approximately 1.5 cm in thickness. Heart size is within normal limits.   The aorta and main pulmonary artery are of normal caliber.    BONES/SOFT TISSUES: No acute osseous abnormality.    VISUALIZED UPPER ABDOMEN: Hepatic steatosis    IMPRESSION:    No evidence of acute pulmonary embolus.    Small bilateral pleural effusions.    Bilateral groundglass and patchy opacities and interlobular septal   thickening, likely on the basis of pulmonary edema.    Pericardial effusion.    --- End of Report ---          JARRETT RECIO MD; Resident Radiologist  This document has been electronically signed.  ZAINAB MANZANARES MD; Attending Radiologist  This document has been electronically signed. Sep 24 2022  3:32AM (09-24-22 @ 00:53)      CARDIOLOGY TESTING  12 Lead ECG:   Ventricular Rate 116 BPM    Atrial Rate 116 BPM    P-R Interval 144 ms    QRS Duration 86 ms    Q-T Interval 328 ms    QTC Calculation(Bazett) 455 ms    P Axis 44 degrees    R Axis 104 degrees    T Axis 19 degrees    Diagnosis Line Sinus tachycardia  Rightward axis  Cannot rule out Inferior infarct , age undetermined  Abnormal ECG    Confirmed by Kiko Castro ([id number]) on 9/22/2022 10:17:30 AM (09-21-22 @ 21:42)      MEDICATIONS  ampicillin/sulbactam  IVPB 3 IV Intermittent every 6 hours  enoxaparin Injectable 40 SubCutaneous every 24 hours  sodium chloride 0.9%. 1000 IV Continuous <Continuous>  sodium phosphate IVPB 30 IV Intermittent once      WEIGHT  Weight (kg): 65.8 (09-21-22 @ 19:57)  Creatinine, Serum: 0.6 mg/dL (09-24-22 @ 05:35)  Creatinine, Serum: 0.6 mg/dL (09-23-22 @ 17:59)      ANTIBIOTICS:  ampicillin/sulbactam  IVPB 3 Gram(s) IV Intermittent every 6 hours      All available historical records have been reviewed       DEMETRIA LAZCANO  22y, Male  Allergy: No Known Allergies      LOS  2d    CHIEF COMPLAINT: fever, facial swelling (24 Sep 2022 07:06)      INTERVAL EVENTS/HPI  - No acute events overnight  - T(F): , Max: 102.3 (09-23-22 @ 15:15)  - remains febrile but reports feeling better   - WBC Count: 8.41 (09-24-22 @ 05:35)  WBC Count: 9.63 (09-23-22 @ 05:57)     - Creatinine, Serum: 0.6 (09-24-22 @ 05:35)  Creatinine, Serum: 0.6 (09-23-22 @ 17:59)       ROS  General: Denies rigors, nightsweats  HEENT: Denies headache, rhinorrhea, sore throat, eye pain  CV: Denies CP, palpitations  PULM: Denies wheezing, hemoptysis  GI: Denies hematemesis, hematochezia, melena  : Denies discharge, hematuria  MSK: Denies arthralgias, myalgias  SKIN: Denies rash, lesions  NEURO: Denies paresthesias, weakness  PSYCH: Denies depression, anxiety    VITALS:  T(F): 98.5, Max: 102.3 (09-23-22 @ 15:15)  HR: 130  BP: 104/73  RR: 18Vital Signs Last 24 Hrs  T(C): 36.9 (24 Sep 2022 08:00), Max: 39.1 (23 Sep 2022 15:15)  T(F): 98.5 (24 Sep 2022 08:00), Max: 102.3 (23 Sep 2022 15:15)  HR: 130 (24 Sep 2022 08:00) (120 - 145)  BP: 104/73 (24 Sep 2022 08:00) (84/56 - 117/53)  BP(mean): 82 (24 Sep 2022 08:00) (65 - 93)  RR: 18 (23 Sep 2022 15:15) (18 - 18)  SpO2: 96% (24 Sep 2022 08:00) (91% - 98%)    Parameters below as of 24 Sep 2022 08:00  Patient On (Oxygen Delivery Method): room air        PHYSICAL EXAM:  Gen: NAD, resting in bed  HEENT: Normocephalic, atraumatic  Neck: supple, no lymphadenopathy  CV: Regular rate & regular rhythm  Lungs: decreased BS at bases, no fremitus  Abdomen: Soft, BS present  Ext: Warm, well perfused  Neuro: non focal, awake  Skin: no rash, no erythema  Lines: no phlebitis    FH: Non-contributory  Social Hx: Non-contributory    TESTS & MEASUREMENTS:                        12.9   8.41  )-----------( 138      ( 24 Sep 2022 05:35 )             37.3     09-24    120<L>  |  92<L>  |  10  ----------------------------<  125<H>  4.1   |  12<L>  |  0.6<L>    Ca    7.0<L>      24 Sep 2022 05:35  Phos  1.5     09-23  Mg     1.9     09-23    TPro  5.0<L>  /  Alb  2.8<L>  /  TBili  0.3  /  DBili  x   /  AST  546<H>  /  ALT  232<H>  /  AlkPhos  61  09-24      LIVER FUNCTIONS - ( 24 Sep 2022 05:35 )  Alb: 2.8 g/dL / Pro: 5.0 g/dL / ALK PHOS: 61 U/L / ALT: 232 U/L / AST: 546 U/L / GGT: x               Culture - Urine (collected 09-22-22 @ 03:15)  Source: Clean Catch Clean Catch (Midstream)  Final Report (09-23-22 @ 14:50):    No growth    Culture - Blood (collected 09-21-22 @ 21:10)  Source: .Blood Blood-Peripheral  Preliminary Report (09-23-22 @ 02:04):    No growth to date.    Culture - Blood (collected 09-21-22 @ 21:10)  Source: .Blood Blood-Peripheral  Preliminary Report (09-23-22 @ 02:04):    No growth to date.        Lactate, Blood: 1.6 mmol/L (09-22-22 @ 11:38)  Lactate, Blood: 1.5 mmol/L (09-21-22 @ 21:10)      INFECTIOUS DISEASES TESTING  HIV-1/2 Combo Result: Nonreact (09-23-22 @ 11:49)      INFLAMMATORY MARKERS  Sedimentation Rate, Erythrocyte: 6 mm/Hr (09-22-22 @ 11:38)  C-Reactive Protein, Serum: 151.9 mg/L (09-22-22 @ 11:38)      RADIOLOGY & ADDITIONAL TESTS:  I have personally reviewed the last available Chest xray  CXR  Xray Chest 1 View- PORTABLE-Urgent:   ACC: [id number] EXAM:  XR CHEST PORTABLE URGENT 1V                          PROCEDURE DATE:  09/21/2022          INTERPRETATION:  Clinical History / Reason for exam: Sepsis    Comparison : Chest radiograph None.    Technique/Positioning: Single AP chest radiograph.    Findings:    Support devices: None.    Cardiac/mediastinum/hilum: Unremarkable.    Lung parenchyma/Pleura: No focal consolidation, effusion or pneumothorax.    Skeleton/soft tissues: Unremarkable.    Impression:    No radiographic evidence of acute cardiopulmonary disease.        --- End of Report ---            CARO ROBERT MD; Attending Radiologist  This document has been electronically signed. Sep 22 2022  8:10AM (09-21-22 @ 22:34)      CT  CT Angio Chest PE Protocol w/ IV Cont:   ACC: [id number] EXAM:  CT ANGIO CHEST PULM Counts include [facility] beds at the [facility]                          PROCEDURE DATE:  09/24/2022          INTERPRETATION:  CLINICAL HISTORY/REASON FOR EXAM: Shortness of breath.    TECHNIQUE: Multislice helical sections were obtained from the thoracic   inlet to the lung bases during rapid administration of intravenous   contrast. Thin sections were reconstructed through the pulmonary   vasculature. Study was performed as CT angiogram. 3D (MIP) reformats   obtained.    COMPARISON: None.      FINDINGS:    PULMONARY EMBOLUS: No pulmonary emboli.    LUNGS, PLEURA, AIRWAYS: Small bilateral pleural effusions. Bilateral   groundglass and patchy opacities with interlobular septal thickening. No   pneumothorax. No evidence of central endobronchial obstruction.    THORACIC NODES: No mediastinal, hilar, supraclavicular, or axillary   lymphadenopathy.    MEDIASTINUM/GREAT VESSELS: A pericardial effusion is present measuring up   to approximately 1.5 cm in thickness. Heart size is within normal limits.   The aorta and main pulmonary artery are of normal caliber.    BONES/SOFT TISSUES: No acute osseous abnormality.    VISUALIZED UPPER ABDOMEN: Hepatic steatosis    IMPRESSION:    No evidence of acute pulmonary embolus.    Small bilateral pleural effusions.    Bilateral groundglass and patchy opacities and interlobular septal   thickening, likely on the basis of pulmonary edema.    Pericardial effusion.    --- End of Report ---          JARRETT RECIO MD; Resident Radiologist  This document has been electronically signed.  ZAINAB MANZANARES MD; Attending Radiologist  This document has been electronically signed. Sep 24 2022  3:32AM (09-24-22 @ 00:53)      CARDIOLOGY TESTING  12 Lead ECG:   Ventricular Rate 116 BPM    Atrial Rate 116 BPM    P-R Interval 144 ms    QRS Duration 86 ms    Q-T Interval 328 ms    QTC Calculation(Bazett) 455 ms    P Axis 44 degrees    R Axis 104 degrees    T Axis 19 degrees    Diagnosis Line Sinus tachycardia  Rightward axis  Cannot rule out Inferior infarct , age undetermined  Abnormal ECG    Confirmed by Kiko Castro ([id number]) on 9/22/2022 10:17:30 AM (09-21-22 @ 21:42)      MEDICATIONS  ampicillin/sulbactam  IVPB 3 IV Intermittent every 6 hours  enoxaparin Injectable 40 SubCutaneous every 24 hours  sodium chloride 0.9%. 1000 IV Continuous <Continuous>  sodium phosphate IVPB 30 IV Intermittent once      WEIGHT  Weight (kg): 65.8 (09-21-22 @ 19:57)  Creatinine, Serum: 0.6 mg/dL (09-24-22 @ 05:35)  Creatinine, Serum: 0.6 mg/dL (09-23-22 @ 17:59)      ANTIBIOTICS:  ampicillin/sulbactam  IVPB 3 Gram(s) IV Intermittent every 6 hours      All available historical records have been reviewed

## 2022-09-24 NOTE — PROGRESS NOTE ADULT - ASSESSMENT
PROBLEMS:  I spent 70 minutes of critical care time examining patient, reviewing vitals, labs, medications, imaging and discussing with the team goals of care to prevent life-threatening in this patient who is at high risk for deterioration or death due to:      pericardial tamponade - s/p Window - straw color fluid 300 CC pericardial and 400 cc pleural - sent for tests  Post op respiratory failure - wean to extubate  Lymphadenopathy - decreasing on IV unasyn and steroids  Metabolic acidosis - follow ABG and BMP, give NaHCO3  Hyperglycemia - on insulin drip - B-hydroxy butyrate is elevated - will repeat in AM  Rhabdomyolysis - NS at 200 cc/hr - now is even fluid balance, continue bicarb, follow electrolytes level  hypophosphatemia, hyponatremia, hypochloremia hypocalcemia - monitor  Transaminazemia - posibly due to tamponade - monitor   low iron level - with mild anemia - monitor   Fever and myalgias - continue IV Unasyn - B/C and U/C so far negative       Case and plan discussed with CT Surgeons and CTU PAs.  Continue CTU supportive care and ongoing plan of care as per continuing CTU rounds.   Continue DVT/GI prophylaxis.      PLAN  Neuro: move all 4 extremities. no sensory or motor deficits  Pain management.   acetaminophen     Tablet .. 650 milliGRAM(s) Oral every 6 hours PRN  dexMEDEtomidine Infusion 1 MICROgram(s)/kG/Hr IV Continuous <Continuous>  HYDROmorphone  Injectable 0.5 milliGRAM(s) IV Push every 6 hours PRN  melatonin 3 milliGRAM(s) Oral at bedtime PRN  meperidine     Injectable 25 milliGRAM(s) IV Push once  ondansetron Injectable 4 milliGRAM(s) IV Push every 8 hours PRN  oxyCODONE    IR 5 milliGRAM(s) Oral every 4 hours PRN  oxyCODONE    IR 10 milliGRAM(s) Oral every 4 hours PRN  propofol Infusion 30 MICROgram(s)/kG/Min IV Continuous <Continuous>    Pulm: Wean off supplemental oxygen as able. Daily CXR. Encourage coughing, deep breathing and use of incentive spirometry.     Cardio: Monitor telemetry/alarms. Continue supportive care     GI: Continue stool softeners.    aluminum hydroxide/magnesium hydroxide/simethicone Suspension 30 milliLiter(s) Oral every 4 hours PRN  calcium carbonate    500 mG (Tums) Chewable 2 Tablet(s) Chew every 8 hours    Nutrition: Continue present diet  Endocrine and glucose control:   dextrose 50% Injectable 50 milliLiter(s) IV Push every 15 minutes  insulin regular Infusion 10 Unit(s)/Hr IV Continuous <Continuous>  methylPREDNISolone sodium succinate Injectable 60 milliGRAM(s) IV Push every 12 hours    Renal: monitor urine output, supplement electrolytes as needed,     Vasc: Heparin SC and/or SCDs for DVT prophylaxis  enoxaparin Injectable 40 milliGRAM(s) SubCutaneous every 24 hours    ID: Stable, no fever , no chills. Off antibiotics.  ampicillin/sulbactam  IVPB 3 Gram(s) IV Intermittent every 6 hours  ceFAZolin   IVPB 1000 milliGRAM(s) IV Intermittent every 8 hours    Tubes: Monitor Chest tube output  Therapy: bed rest    Pertinent clinical, laboratory, radiographic, hemodynamic, echocardiographic, respiratory data, microbiologic data and chart were reviewed and analyzed frequently throughout the course of the day and night. GI and DVT prophylaxis, glycemic control, head of bed elevation and skin care issues were addressed.  Patient seen, examined and plan discussed with CT Surgery / CTICU team during rounds.    [x ] The patient remains in critical and unstable condition, and requires ICU care and monitoring  [ ] The patient is improving but requires continued monitoring and adjustment of therapy

## 2022-09-24 NOTE — CONSULT NOTE ADULT - NS ATTEND OPT1 GEN_ALL_CORE
Patient Seen in: [facility] Emergency Department      History   Patient presents with:  Lower Extremity Injury (musculoskeletal)    Stated Complaint: R 5th toe injury - \"it looks black. \" pt is a diabetic and on dialysis    HPI    67-year-old mal
Neurological: Negative. All other systems reviewed and are negative. Positive for stated complaint: R 5th toe injury - \"it looks black. \" pt is a diabetic and on dialysis  Other systems are as noted in HPI.   Constitutional and vital signs reviewed
Labs Reviewed   BASIC METABOLIC PANEL (8) - Abnormal; Notable for the following components:       Result Value    Glucose 204 (*)     Sodium 132 (*)     Chloride 95 (*)     CO2 33.0 (*)     BUN 31 (*)     Creatinine 4.73 (*)     BUN/CREA Ratio 6.6 (*)
CBC unremarkable. BMP consistent with patient's known ESRD. Procalcitonin is elevated. ESR and CRP are also elevated. Imaging with some subungual gas. 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   Given patient's more proximal pain there is concern for possi
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   We recommend that you schedule follow up care with a primary care provider within the next
I attest my time as attending is greater than 50% of the total combined time spent on qualifying patient care activities by the PA/NP and attending.
I independently performed the documented:

## 2022-09-24 NOTE — CONSULT NOTE ADULT - SUBJECTIVE AND OBJECTIVE BOX
Surgeon: Dr. Kavin Bhakta    Consult requesting by: Medicine     HISTORY OF PRESENT ILLNESS:  23 yo M with no significant PMHx presents to the ED c/o R sided lower dental pain x 1 week with development of fever/diffuse myalgias x 3 days. Pt had gone work on Monday in his usual state of health. When he came home that night he had begun complaining of aches and feeling unwell. Over the past few days, his dental pain has gotten worse. He also endorses having fever as high as 103. He reports the pain has made it difficult for him to walk; he also became nauseous when eating and he hasn't had anything but water for past two days. He denies any headaches, sob, chest pain, abd pain, diarrhea, constipation. Pt has no recent dental work, no trauma, no known sick contacts, no pets or animal contact. Pt's father reports he has not had any vaccinations.     Pt went to urgent care last night where he tested negative for covid and was given tylenol. He was told to come to the ED. In the ED, he was given a bolus of fluids, tylenol, and zosyn. CT showed enlarged right submandibular and right anterior upper jugular lymph nodes and associated adjacent soft tissue changes without drainable fluid collection.  (22 Sep 2022 08:22)    Patient then underwent CT chest PE protocol which was negative for PE but demonstrated pericardial effusion. Bedside echo showed large pericardial effusion in select windows with rv compromise. TTE revealed moderate circumferential pericardial effusion with signs of tamponade and globally reduced LVEF. CT surgery and cardiology consulted for drainage of pericardial effusion.        NYHA functional class    [ ] Class I (no limitation) [ ] Class II (slight limitation) [ ] Class III (marked limitation) [ ] Class IV (symptoms at rest)    PAST MEDICAL & SURGICAL HISTORY:  No pertinent past medical history      No significant past surgical history    MEDICATIONS  (STANDING):  ampicillin/sulbactam  IVPB 3 Gram(s) IV Intermittent every 6 hours  calcium carbonate    500 mG (Tums) Chewable 2 Tablet(s) Chew every 8 hours  calcium gluconate IVPB 2 Gram(s) IV Intermittent once  enoxaparin Injectable 40 milliGRAM(s) SubCutaneous every 24 hours  sodium bicarbonate 650 milliGRAM(s) Oral every 8 hours  sodium chloride 0.9%. 1000 milliLiter(s) (200 mL/Hr) IV Continuous <Continuous>    MEDICATIONS  (PRN):  acetaminophen     Tablet .. 650 milliGRAM(s) Oral every 6 hours PRN Temp greater or equal to 38C (100.4F), Mild Pain (1 - 3)  aluminum hydroxide/magnesium hydroxide/simethicone Suspension 30 milliLiter(s) Oral every 4 hours PRN Dyspepsia  melatonin 3 milliGRAM(s) Oral at bedtime PRN Insomnia  ondansetron Injectable 4 milliGRAM(s) IV Push every 8 hours PRN Nausea and/or Vomiting      Allergies    No Known Allergies    Intolerances      Review of Systems  CONSTITUTIONAL:  Fevers[ x] chills[ x] sweats[x ] fatigue[ ] weight loss[ ] weight gain [ ]                                        NEURO:  paresthesias[ ] seizures [ ]  syncope [ ]  confusion [ ]                                                                                NEGATIVE[ X]   EYES: glasses[ ]  blurry vision[ ]  discharge[ ] pain[ ] glaucoma [ ]                                                                          NEGATIVE[X ]   ENMT:  difficulty hearing [ ]  vertigo[ ]  dysphagia[ ] epistaxis[ ] recent dental work [ ]                                    NEGATIVE[ X]   CV:  chest pain[ ] palpitations[x ] COLE [x ] diaphoresis [ ]                                                                                              RESPIRATORY:  wheezing[ ] SOB[x ] cough [ ] sputum[ ] hemoptysis[ ]                                                                     GI:  nausea[ ]  vomiting [ ]  diarrhea[ ] constipation [ ] melena [ ]                                                                         NEGATIVE[ X]   : hematuria[ ]  dysuria[ ] urgency[ ] incontinence[ ]                                                                                            NEGATIVE[ X]   MUSKULOSKELETAL:  arthritis[ ]  joint swelling [ ] muscle weakness [ ] Hx vein stripping [ ]                             NEGATIVE[X ]   SKIN/BREAST:  rash[ ] itching [ ]  hair loss[ ] masses[ ]                                                                                              NEGATIVE[ X]   PSYCH:  dementia [ ] depression [ ] anxiety[ ]                                                                                                               NEGATIVE[X ]   HEME/LYMPH:  bruises easily[ ] enlarged lymph nodes[ ] tender lymph nodes[ ]                                               NEGATIVE[ X]   ENDOCRINE:  cold intolerance[ ] heat intolerance[ ] polydipsia[ ]                                                                          NEGATIVE[ X]     PHYSICAL EXAM  Vital Signs Last 24 Hrs  T(C): 37 (24 Sep 2022 15:10), Max: 38.9 (24 Sep 2022 00:00)  T(F): 98.6 (24 Sep 2022 15:10), Max: 102 (24 Sep 2022 00:00)  HR: 126 (24 Sep 2022 15:10) (120 - 145)  BP: 127/84 (24 Sep 2022 15:10) (84/56 - 133/86)  BP(mean): 98 (24 Sep 2022 15:00) (65 - 103)  RR: 20 (24 Sep 2022 15:10) (20 - 20)  SpO2: 96% (24 Sep 2022 15:10) (91% - 98%)    CONSTITUTIONAL: diaphoretic   Neuro: WNL [x ] Normal exam oriented to person/place & time with no focal motor or sensory  deficits. Other                     Eyes:    WNL [ x] Normal exam of conjunctiva & lids, pupils equally reactive. Other     ENT:     WNL [x ] Normal exam of nasal/oral mucosa with absence of cyanosis. Other  Neck:   WNL [ x] Normal exam of jugular veins, trachea & thyroid. Other  Chest:  WNL [x ] Normal lung exam with good air movement absence of wheezes, rales, or rhonchi: Other                                                                                CV:  Auscultation: Diminished s1/s2  Murmurs none:[x ]systolic [ ]  diastolic [ ] holosystolic [ ]  GI: WNL[ x] Normal exam of abdomen, liver & spleen with no noted masses or tenderness. Other                                                                                                        Extremities: WNL[ x] Normal no evidence of cyanosis or deformity Edema: none[ ]trace[ ]1+[ ]2+[ ]3+[ ]4+[ ]  Lower Extremity Pulses: 1+ bl dp pulses     LABS:                        12.9   8.41  )-----------( 138      ( 24 Sep 2022 05:35 )             37.3     09-24    120<L>  |  94<L>  |  10  ----------------------------<  131<H>  4.3   |  15<L>  |  0.6<L>    Ca    6.7<L>      24 Sep 2022 13:51  Phos  2.2     09-24  Mg     2.2     09-24    TPro  5.0<L>  /  Alb  2.8<L>  /  TBili  0.3  /  DBili  x   /  AST  546<H>  /  ALT  232<H>  /  AlkPhos  61  09-24    PT/INR - ( 24 Sep 2022 15:19 )   PT: 13.00 sec;   INR: 1.13 ratio       PTT - ( 24 Sep 2022 15:19 )  PTT:29.3 sec    CARDIAC MARKERS ( 24 Sep 2022 13:51 )  x     / 1.36 ng/mL / x     / x     / x      CARDIAC MARKERS ( 24 Sep 2022 05:35 )  x     / x     / 35551 U/L / x     / x      CARDIAC MARKERS ( 23 Sep 2022 17:59 )  x     / x     / 26094 U/L / x     / x      CARDIAC MARKERS ( 23 Sep 2022 05:57 )  x     / x     / 44303 U/L / x     / x      CARDIAC MARKERS ( 22 Sep 2022 17:54 )  x     / x     / 77968 U/L / x     / x        COVID Result: SARS-CoV-2 Result: NotDete: This Respiratory Panel uses polymerase chain reaction (PCR) to detect for  influenza A; influenza B; respiratory syncytial virus; and SARS-CoV-2.  This test was validated by [facility] and is in use under the FDA  Emergency Use Authorization (EUA) for clinical labs CLIA-certified to  perform high complexity testing. Test results should be correlated with  clinical presentation, patient history, and epidemiology. (09.21.22 @ 20:51)      TTE: < from: TTE Echo Complete w/o Contrast w/ Doppler (09.24.22 @ 13:24) >  Summary:   1. Left ventricular ejection fraction, by visual estimation, is 20%.   2. Severely decreased global left ventricular systolic function.   3. Moderate circumferential pericardial effusion.   4. Right ventricular diastolic collapse.   5. Systolic inversion of the right atrial free wall.   6. Findings are consistent with cardiactamponade.   7. Findings discussed with Dr. Mclaughlin.    PHYSICIAN INTERPRETATION:  Left Ventricle: The left ventricular internal cavity size is normal. Left   ventricular wall thickness is normal. Global LV systolic function was   severely decreased. Left ventricular ejection fraction, by visual   estimation, is 20%.  Right Ventricle: The right ventricular size is normal.  Left Atrium: Normal left atrial size.  Right Atrium: Normal right atrial size. Systolic inversion of the right   atrial freewall.  Pericardium: There is excessive respiratory variation in the mitral and   tricuspid valve Doppler velocities. There is evidence of cardiac   tamponade. Moderate circumferential pericardial effusion. Right   ventricular diastolic collapse.  Mitral Valve: Structurally normal mitral valve with normal leaflet   excursion. No evidence of mitral stenosis. No mitral regurgitation.  Tricuspid Valve: Structurally normal tricuspid valve with normal leaflet   excursion. No tricuspid regurgitation is visualized.  Aortic Valve: Normal trileaflet aortic valve with normal opening. No   evidence of aortic stenosis. No aortic regurgitation.  Pulmonic Valve: The pulmonic valve is normal. No pulmonic regurgitation.  Aorta: The aortic root and ascending aorta are normal in size.  Venous: The inferior vena cava is dilated.    < end of copied text >        Assessment/ Plan: 23 yo M with no significant PMHx presents to the ED c/o 3 days fever/chills/malaise/myalgias associated w/R sided lower dental pain x 1 week. Pt admitted for possible sepsis w/cervical/mandibular lymphadenopathy and underwent CT chest PE protocol which was negative for PE but demonstrated pericardial effusion re-demonstrated on TTE as moderate circumferential pericardial effusion with signs of tamponade and globally reduced LVEF. CT surgery and cardiology consulted for drainage of pericardial effusion.     -Case and plan discussed with CT surgeon Dr. Kavin Bhakta. Evaluation by full heart team pending. Attending note to follow. Pre-op for: Pericardial window vs. pericardiocentesis.

## 2022-09-24 NOTE — PROGRESS NOTE ADULT - ASSESSMENT
ASSESSMENT & PLAN:  21 yo M with no significant PMHx presents to the ED c/o R sided lower dental pain x 1 week with development of fever/diffuse myalgias x 3 days. Pt has no recent dental work, no trauma, no known sick contacts, no pets or animal contact. Pt's father reports he has not had any vaccinations.  CT showed enlarged right submandibular and right anterior upper jugular lymph nodes and associated adjacent soft tissue changes without drainable fluid collection.     #Sepsis (T>101, pulse >90, febrile)   #R Facial swelling and dental pain? Inflamed LN on CVT scan likely viral vs bacterial   #Pericardial effusion, pleural effusion suspected polymyositis   #Rhabdo -->36K   #suspected cardiac tamponade   - 1 week h/o right sided dental pain; 3 day h/o fever, diffuse myalgias  - Tmax at 105 (oral and rectal) in ED  - CT: enlarged right submandibular and right anterior upper jugular lymph nodes and associated adjacent soft tissue changes without drainable fluid collection.   - CXR - no acute pathology  - ID consulted appreciated  - f/u blood and urine cultures  - s/p zosyn in ED  - continue Unasyn 3gm q6h IVPB per ID recs  - ESR, CRP  - EBV IgM, CMV IgM, Toxoplasma IgM  - dental consult  - ENT consult appreciated   - cardiology consulted, bedside echo showing tamponed STAT CTS consult, spoke to them, will need official echo, may need pericardial window   - rhem consulted follow up reccs, spoke to Dr. Gordon he will put his reccs  - started on solumedrol 60 q 12hr  - continue IVF NS, 100 cc per hr  - ESR 6, still can be normal if polymyositis recheck inflammatory labs       #Rhabdo   - CK 5941-->21K-->36K   - repeat CK BID and CMP BID   - suspected rhadbo 2/2 sepsis/severe myalgias for past 3 days  -  cc/hr NS  - started on steroids   - K stable   - nephro following   - no change in fluids    #HAGMA likely 2/2 polymyositis - IVF, no bicarb  ggt as per nephro, - PH 7.45, co2 19, hco3 13    #hypophos- replete- repeated      #Transaminitis likely 2/2 Rhabdo   -  -> 138  - ALT 72 -> 71  - repeat LFTs  - RUQ US noted  - Hepatology consult appreciated     #Hyponatremia - resolving  - 125 -> 134-->120 today  - IVF  - follow up nephro     #Progress Note Handoff  Pending (specify):  as above  Disposition: ICU, guarded prognosis

## 2022-09-24 NOTE — PROGRESS NOTE ADULT - ASSESSMENT
IMPRESSION:    Sepsis POA  Submandibular infection / inflammation improving  Rhabdomyolysis - worsening CK  Pericardial effusion  b/l small pleural effusions  Hyponatremia    PLAN:    CNS:  No depressants.  UDS SDS negative     HEENT: Oral care.  Dental eval appreciated.    PULMONARY:  HOB @ 45 degrees.  Aspiration precautions     CARDIOVASCULAR:  FU labs and adjust IVF.  ECHO.  TSH    GI: GI prophylaxis.  Feeding.  Bowel regimen.  RUQ sono     RENAL:  Follow up lytes q 8.  Correct as needed. avoid over correction. Urine Myoglobin.  Renal eval. Replete Phosphorus    INFECTIOUS DISEASE: Follow up cultures.  ABX per ID.  Hep panel negative. GI eval    HEMATOLOGICAL:  DVT prophylaxis.  Dimer noted.  FU LE duplex.     ENDOCRINE:  Follow up FS.  Insulin protocol if needed.    MUSCULOSKELETAL:  Bed chair position.  Rheum screen.  Rheum eval     ICU monitoring for now      IMPRESSION:    Sepsis POA  Submandibular infection / inflammation improving  Rhabdomyolysis - worsening CK  polymyositis ??  worsening metabolic acidosis ( AG/ NAG)  Pericardial effusion  b/l small pleural effusions  Hyponatremia    PLAN:    CNS:  No depressants.    HEENT: Oral care.  Dental eval appreciated.    PULMONARY:  HOB @ 45 degrees.  Aspiration precautions, on RA, solumedrol 60 q 12    CARDIOVASCULAR:  FU labs and adjust IVF.  ECHO ( Spoke with cardio)    GI: GI prophylaxis.  Feeding.  Bowel regimen.  RUQ sono     RENAL:  Follow up lytes q 8.  Correct as needed. avoid over correction. Urine Myoglobin.  Renal eval. Replete Phosphorus    INFECTIOUS DISEASE: Follow up cultures.  ABX per ID.  Hep panel negative. GI eval    HEMATOLOGICAL:  DVT prophylaxis.  FU LE duplex. ferritin    ENDOCRINE:  Follow up FS.  Insulin protocol if needed.    MUSCULOSKELETAL:  Bed chair position.  Rheum screen.  Rheum eval were called    ICU monitoring for now      IMPRESSION:    Sepsis POA  Submandibular infection / inflammation improving  Rhabdomyolysis - worsening CK  polymyositis ??  worsening metabolic acidosis ( AG/ NAG)  Pericardial effusion  b/l small pleural effusions  Hyponatremia    PLAN:    CNS:  No depressants.    HEENT: Oral care.  Dental eval appreciated.    PULMONARY:  HOB @ 45 degrees.  Aspiration precautions, on RA, solumedrol     CARDIOVASCULAR:  FU labs and adjust IVF.  ECHO ( Spoke with cardio)    GI: GI prophylaxis.  Feeding.  Bowel regimen.  RUQ sono     RENAL:  Follow up lytes q 8.  Correct as needed. avoid over correction. Urine Myoglobin.  Renal eval. Replete Phosphorus    INFECTIOUS DISEASE: Follow up cultures.  ABX per ID.  Hep panel negative. GI eval    HEMATOLOGICAL:  DVT prophylaxis.  FU LE duplex. ferritin    ENDOCRINE:  Follow up FS.  Insulin protocol if needed.    MUSCULOSKELETAL:  Bed chair position.  Rheum screen.  Rheum eval were called    ICU monitoring for now

## 2022-09-24 NOTE — PROGRESS NOTE ADULT - SUBJECTIVE AND OBJECTIVE BOX
CTU Attending Progress Daily Note     24 Sep 2022 20:07  Admited 09-22-22, Hospital Day 2d  POD# - 0    HPI:  23 yo M with no significant PMHx presents to the ED c/o R sided lower dental pain x 1 week with development of fever/diffuse myalgias x 3 days. Pt had gone work on Monday in his usual state of health. When he came home that night he had begun complaining of aches and feeling unwell. Over the past few days, his dental pain has gotten worse. He also endorses having fever as high as 103. He reports the pain has made it difficult for him to walk; he also became nauseous when eating and he hasn't had anything but water for past two days. He denies any headaches, sob, chest pain, abd pain, diarrhea, constipation. Pt has no recent dental work, no trauma, no known sick contacts, no pets or animal contact. Pt's father reports he has not had any vaccinations.     Pt went to urgent care last night where he tested negative for covid and was given tylenol. He was told to come to the ED. In the ED, he was given a bolus of fluids, tylenol, and zosyn. CT showed enlarged right submandibular and right anterior upper jugular lymph nodes and associated adjacent soft tissue changes without drainable fluid collection.  (22 Sep 2022 08:22)    Home Medications:    FAMILY HISTORY:    PAST MEDICAL & SURGICAL HISTORY:  No pertinent past medical history      No significant past surgical history        Interval event for past 24 hr:  DEMETRIA LAZCANO  22y had pericardial effusion with pretamponade.   Current Complains:  DEMETRIA LAZCANO has no new complains  Allergies    No Known Allergies    Intolerances      OBJECTIVE:  Vitals last 24 hrs  T(C): 37.7 (09-24-22 @ 18:30), Max: 38.9 (09-24-22 @ 00:00)  T(F): 99.9 (09-24-22 @ 18:30), Max: 102 (09-24-22 @ 00:00)  HR: 86 (09-24-22 @ 19:59) (86 - 145)  BP: 112/61 (09-24-22 @ 19:00) (84/56 - 133/86)  ABP: 107/65 (09-24-22 @ 19:00) (46/29 - 111/72)  ABP(mean): 79 (09-24-22 @ 19:00) (34 - 88)  RR: 21 (09-24-22 @ 19:00) (20 - 33)  SpO2: 100% (09-24-22 @ 19:59) (91% - 100%)      09-23-22 @ 07:01  -  09-24-22 @ 07:00  --------------------------------------------------------  IN:    sodium chloride 0.9%: 4600 mL    Sodium Chloride 0.9% Bolus: 500 mL  Total IN: 5100 mL    OUT:    Indwelling Catheter - Urethral (mL): 1525 mL  Total OUT: 1525 mL    Total NET: 3575 mL        Mode: AC/ CMV (Assist Control/ Continuous Mandatory Ventilation)  RR (machine): 14  TV (machine): 500  FiO2: 60  PEEP: 6  MAP: 9  PIP: 12     Mode: AC/ CMV (Assist Control/ Continuous Mandatory Ventilation), RR (machine): 14, TV (machine): 500, FiO2: 60, PEEP: 6, MAP: 9, PIP: 12  CAPILLARY BLOOD GLUCOSE      POCT Blood Glucose.: 154 mg/dL (24 Sep 2022 18:29)    LABS:  ABG - ( 24 Sep 2022 18:12 )  pH, Arterial: 7.36  pH, Blood: x     /  pCO2: 32    /  pO2: 164   / HCO3: 18    / Base Excess: -6.4  /  SaO2: 98.7            Blood Gas Arterial, Lactate: 2.20 mmol/L *H* (09-24-22 @ 18:12)  Lactate, Blood: 3.3 mmol/L *H* (09-24-22 @ 11:34)  Blood Gas Arterial, Lactate: 1.80 mmol/L (09-24-22 @ 08:16)                          12.9   8.41  )-----------( 138      ( 24 Sep 2022 05:35 )             37.3     Hemoglobin: 12.9 g/dL (09-24 @ 05:35)  Hemoglobin: 13.7 g/dL (09-23 @ 05:57)  Hemoglobin: 14.0 g/dL (09-22 @ 11:38)  Hemoglobin: 15.3 g/dL (09-21 @ 21:10)    09-24    120<L>  |  94<L>  |  10  ----------------------------<  131<H>  4.3   |  15<L>  |  0.6<L>    Ca    6.7<L>      24 Sep 2022 13:51  Phos  2.2     09-24  Mg     2.2     09-24    TPro  5.0<L>  /  Alb  2.8<L>  /  TBili  0.3  /  DBili  x   /  AST  546<H>  /  ALT  232<H>  /  AlkPhos  61  09-24    Creatinine, Serum: 0.6 mg/dL (09-24 @ 13:51)  Creatinine, Serum: 0.6 mg/dL (09-24 @ 11:34)  Creatinine, Serum: 0.6 mg/dL (09-24 @ 05:35)  Creatinine, Serum: 0.6 mg/dL (09-23 @ 17:59)  Creatinine, Serum: 0.8 mg/dL (09-23 @ 05:57)  Creatinine, Serum: 0.8 mg/dL (09-22 @ 11:38)  Creatinine, Serum: 0.9 mg/dL (09-22 @ 04:45)  Creatinine, Serum: 1.0 mg/dL (09-21 @ 21:10)    PT/INR - ( 24 Sep 2022 15:19 )   PT: 13.00 sec;   INR: 1.13 ratio         PTT - ( 24 Sep 2022 15:19 )  PTT:29.3 sec      Culture - Urine (collected 22 Sep 2022 03:15)  Source: Clean Catch Clean Catch (Midstream)  Final Report (23 Sep 2022 14:50):    No growth    Culture - Blood (collected 21 Sep 2022 21:10)  Source: .Blood Blood-Peripheral  Preliminary Report (23 Sep 2022 02:04):    No growth to date.    Culture - Blood (collected 21 Sep 2022 21:10)  Source: .Blood Blood-Peripheral  Preliminary Report (23 Sep 2022 02:04):    No growth to date.      HOSPITAL MEDICATIONS:  MEDICATIONS  (STANDING):  ampicillin/sulbactam  IVPB 3 Gram(s) IV Intermittent every 6 hours  calcium carbonate    500 mG (Tums) Chewable 2 Tablet(s) Chew every 8 hours  calcium gluconate IVPB 2 Gram(s) IV Intermittent once  ceFAZolin   IVPB 1000 milliGRAM(s) IV Intermittent every 8 hours  chlorhexidine 0.12% Liquid 15 milliLiter(s) Oral Mucosa every 12 hours  dexMEDEtomidine Infusion 1 MICROgram(s)/kG/Hr (16.5 mL/Hr) IV Continuous <Continuous>  dextrose 50% Injectable 50 milliLiter(s) IV Push every 15 minutes  enoxaparin Injectable 40 milliGRAM(s) SubCutaneous every 24 hours  insulin regular Infusion 10 Unit(s)/Hr (10 mL/Hr) IV Continuous <Continuous>  meperidine     Injectable 25 milliGRAM(s) IV Push once  methylPREDNISolone sodium succinate Injectable 60 milliGRAM(s) IV Push every 12 hours  potassium chloride   Solution 20 milliEquivalent(s) Enteral Tube once  propofol Infusion 30 MICROgram(s)/kG/Min (11.8 mL/Hr) IV Continuous <Continuous>  sodium bicarbonate 650 milliGRAM(s) Oral every 8 hours  sodium bicarbonate  Injectable 50 milliEquivalent(s) IV Push once  sodium chloride 0.9%. 1000 milliLiter(s) (200 mL/Hr) IV Continuous <Continuous>    MEDICATIONS  (PRN):  acetaminophen     Tablet .. 650 milliGRAM(s) Oral every 6 hours PRN Temp greater or equal to 38C (100.4F), Mild Pain (1 - 3)  aluminum hydroxide/magnesium hydroxide/simethicone Suspension 30 milliLiter(s) Oral every 4 hours PRN Dyspepsia  HYDROmorphone  Injectable 0.5 milliGRAM(s) IV Push every 6 hours PRN Breakthrough Pain  melatonin 3 milliGRAM(s) Oral at bedtime PRN Insomnia  ondansetron Injectable 4 milliGRAM(s) IV Push every 8 hours PRN Nausea and/or Vomiting  oxyCODONE    IR 5 milliGRAM(s) Oral every 4 hours PRN Moderate Pain (4 - 6)  oxyCODONE    IR 10 milliGRAM(s) Oral every 4 hours PRN Severe Pain (7 - 10)      REVIEW OF SYSTEMS: - limited due to intubation and language barrier   CONSTITUTIONAL: [X] all negative; [ ] weakness, [ ] fevers, [ ] chills  EYES/ENT: [X] all negative; [ ] visual changes, [ ] vertigo, [ ] throat pain, [ ] eye pain  NECK: [X] all negative; [ ] pain, [ ] stiffness  RESPIRATORY: [ ] all negative, [ ] cough, [ ] wheezing, [ ] hemoptysis, [ ] shortness of breath, [  ] chest pain  CARDIOVASCULAR: [X] all negative; [ ] anginal chest pain, [ ] palpitations, [ ] orthopnea  GASTROINTESTINAL: [X] all negative; [ ]abdominal pain, [ ] nausea, [ ] vomiting, [ ] hematemesis, [ ] diarrhea, [ ] constipation, [ ] melena, [ ] hematochezia.  GENITOURINARY: [X] all negative; [ ] dysuria, [ ] frequency, [ ] hematuria  NEUROLOGICAL: [X] all negative; [ ] numbness, [ ] weakness, [ ] paresthesias  MUSCULOSKELETAL: [X] all negative, [ ] joint pain, [ ] joint swelling, [ ] joint redness, [ ] bone pain  SKIN: [X] all negative; [ ] itching, [ ] burning, [ ] rashes, [ ] lesions   All other review of systems is negative unless indicated above.    [  ] Unable to assess ROS because     PHYSICAL EXAM:          CONSTITUTIONAL: Well-developed; well-nourished; in no acute distress.   	SKIN: warm, dry, no rashes or lesions  	HEENT: Atraumatic. Normocephalic. PERRL. Moist membranes, no conjunctival injection, sclera clear  	NECK: Supple; non tender.  No JVD. No lymphadenopathy.  	CARD: Normal S1, S2. Rate and Rhythm are regular. No murmurs.  	RESP: Good air entry bilaterally, no wheezes, no rales no rhonchi.  	ABD: Soft, not tender, not distended, no CVA tenderness, no rebound no guarding, bowel sounds present  	EXT: Normal ROM.  No clubbing, no cyanosis, no pedal edema, no calf pain b/l, Peripheral pulses intact.  	LYMPH: submandibular adenopathy.  	NEURO: Alert, awake, motor 5/5 R, 5/5 L, sensation intact bilat, CN 2-12 intact,          PSYCH: Cooperative, appropriate. Alert & oriented x 3    RADIOLOGY:  X Reviewed and interpreted by me  CxR from 09-24-22 shows mild congestion more on the right site, no pneumothorax, no effusion, no cardiomegaly,   ETT above hans in good position, NGT in place, Chest Tubes in place,

## 2022-09-24 NOTE — CONSULT NOTE ADULT - ATTENDING COMMENTS
Seen / examined and above reviewed.    No cardiac history.    Hospitalized with myopericarditis.  Apparently associated with systemic inflammatory process of undetermined etiology.    ECHO reviewed.  Severe LV dysfunction.  Moderate circumferential pericardial effusion with ECHO tamponade physiology (RV diastolic collapse and MV / TV respiratory variation).    Appears ill / uncomfortable.  INV responsive hypotension overnight.  's when evaluated.    Pericardial drainage reasonable given size of effusion, fluid responsive hypotension, ECHO findings, and LV dysfunction / myocarditis (likely poor reserve).    Evaluated patient in OR along with Dr. Bhakta.  Suboptimal windows for percutaneous drainage, thus decision made to proceed with surgical window.  Patient to be admitted to CTU post-procedure.    Case also discussed with Dr. Watson.  Will determine need for myocardial biopsy pending clinical course.    Close hemodynamics monitoring.
Elevated liver enzymes consistent with rhabdomyolysis due to oral cavity infection.
IMPRESSION:    Right mouth swelling  Rhabdomyolysis  Sepsis on admission  Elevated liver enzymes    Impression and plan are my own.
Patient seen and examined with renal fellow    23 yo M with no significant PMHx presents to the ED c/o R sided lower dental pain x 1 week with development of fever/diffuse myalgias x 3 days. Pt had gone work on Monday in his usual state of health. When he came home that night he had begun complaining of aches and feeling unwell. He was found to have right jaw soft tissue infection, in severe sepsis and was started on abx.    # rhabdomyolysis / soft tissue infection   continue IVF NS at 200 cc per hour  strict I and O   may need fernando catheter  if oliguric may need diuretics ( IV lasix)  follow BMP IP qshift     will follow

## 2022-09-24 NOTE — CONSULT NOTE ADULT - SUBJECTIVE AND OBJECTIVE BOX
HPI:  23 yo M with no significant PMHx presents to the ED c/o R sided lower dental pain x 1 week with development of fever/diffuse myalgias x 3 days. Pt had gone work on Monday in his usual state of health. When he came home that night he had begun complaining of aches and feeling unwell. Over the past few days, his dental pain has gotten worse. He also endorses having fever as high as 103. He reports the pain has made it difficult for him to walk; he also became nauseous when eating and he hasn't had anything but water for past two days. He denies any headaches, sob, chest pain, abd pain, diarrhea, constipation. Pt has no recent dental work, no trauma, no known sick contacts, no pets or animal contact. Pt's father reports he has not had any vaccinations.     Pt went to urgent care last night where he tested negative for covid and was given tylenol. He was told to come to the ED. In the ED, he was given a bolus of fluids, tylenol, and zosyn. CT showed enlarged right submandibular and right anterior upper jugular lymph nodes and associated adjacent soft tissue changes without drainable fluid collection.  (22 Sep 2022 08:22)      ---  cardio fellow additional notes:    Pt admitted for fever, right jaw pain, elevated CPK getting worked up for myositis vs inflammatory/autoimmune conditions and possible vasculitis. Cardio consulted for pericardial effusion.     PAST MEDICAL & SURGICAL HISTORY  No pertinent past medical history    No significant past surgical history        FAMILY HISTORY:  FAMILY HISTORY:      SOCIAL HISTORY:  Social History:  Denies alcohol use, smoking  Lives with family at home  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)      ALLERGIES:  No Known Allergies      MEDICATIONS:  ampicillin/sulbactam  IVPB 3 Gram(s) IV Intermittent every 6 hours  calcium carbonate    500 mG (Tums) Chewable 2 Tablet(s) Chew every 8 hours  enoxaparin Injectable 40 milliGRAM(s) SubCutaneous every 24 hours  sodium bicarbonate 650 milliGRAM(s) Oral every 8 hours  sodium chloride 0.9%. 1000 milliLiter(s) (200 mL/Hr) IV Continuous <Continuous>    PRN:  acetaminophen     Tablet .. 650 milliGRAM(s) Oral every 6 hours PRN  aluminum hydroxide/magnesium hydroxide/simethicone Suspension 30 milliLiter(s) Oral every 4 hours PRN  melatonin 3 milliGRAM(s) Oral at bedtime PRN  ondansetron Injectable 4 milliGRAM(s) IV Push every 8 hours PRN      HOME MEDICATIONS:  Home Medications:      VITALS:   T(F): 100.8 (09-24 @ 13:00), Max: 105.6 (09-22 @ 09:33)  HR: 128 (09-24 @ 14:00) (68 - 145)  BP: 128/55 (09-24 @ 14:00) (84/56 - 142/98)  BP(mean): 86 (09-24 @ 14:00) (65 - 103)  RR: 18 (09-23 @ 15:15) (12 - 22)  SpO2: 96% (09-24 @ 14:00) (91% - 100%)    I&O's Summary    23 Sep 2022 07:01  -  24 Sep 2022 07:00  --------------------------------------------------------  IN: 5100 mL / OUT: 1525 mL / NET: 3575 mL    24 Sep 2022 07:01  -  24 Sep 2022 14:49  --------------------------------------------------------  IN: 918 mL / OUT: 925 mL / NET: -7 mL        REVIEW OF SYSTEMS:  CONSTITUTIONAL: Fever with generalized weakness  HEENT: No visual changes. Right Jaw pain  RESPIRATORY: No cough  CARDIOVASCULAR: See HPI  GASTROINTESTINAL: No abdominal pain. No nausea, vomiting, diarrhea   GENITOURINARY: No dysuria, frequency or hematuria  NEUROLOGICAL: No new focal deficits  SKIN: No new rashes    PHYSICAL EXAM:  General: tachypneic  HEENT: EOMI  Cardio: regular, S1, S2, muffled heart sounds  Pulm: decreased air entry b/l bases  Abdomen: Soft, non-tender, non-distended. Normoactive bowel sounds  Extremities: No edema b/l le  Neuro: A&O x3. No focal deficits    LABS:                        12.9   8.41  )-----------( 138      ( 24 Sep 2022 05:35 )             37.3     09-24    120<L>  |  94<L>  |  10  ----------------------------<  131<H>  4.3   |  15<L>  |  0.6<L>    Ca    6.7<L>      24 Sep 2022 13:51  Phos  2.2     09-24  Mg     2.2     09-24    TPro  5.0<L>  /  Alb  2.8<L>  /  TBili  0.3  /  DBili  x   /  AST  546<H>  /  ALT  232<H>  /  AlkPhos  61  09-24      Sedimentation Rate, Erythrocyte: 22 mm/Hr *H* (09-24-22 @ 11:34)  Lactate, Blood: 3.3 mmol/L *H* (09-24-22 @ 11:34)  Creatine Kinase, Serum: 65114 U/L *H* (09-24-22 @ 05:35)  Creatine Kinase, Serum: 17869 U/L *H* (09-23-22 @ 17:59)    CARDIAC MARKERS ( 24 Sep 2022 05:35 )  x     / x     / 02524 U/L / x     / x      CARDIAC MARKERS ( 23 Sep 2022 17:59 )  x     / x     / 23257 U/L / x     / x      CARDIAC MARKERS ( 23 Sep 2022 05:57 )  x     / x     / 00851 U/L / x     / x      CARDIAC MARKERS ( 22 Sep 2022 17:54 )  x     / x     / 56041 U/L / x     / x            Troponin trend:            RADIOLOGY:  -CXR: < from: CT Angio Chest PE Protocol w/ IV Cont (09.24.22 @ 00:53) >  IMPRESSION:    No evidence of acute pulmonary embolus.    Small bilateral pleural effusions.    Bilateral groundglass and patchy opacities and interlobular septal   thickening, likely on the basis of pulmonary edema.    Pericardial effusion.    < end of copied text >    -TTE: N/A    ECG:  Sinus Tachycardia possible inferior infarct    TELEMETRY EVENTS: SInus Tach

## 2022-09-24 NOTE — PROGRESS NOTE ADULT - SUBJECTIVE AND OBJECTIVE BOX
seen and examined  24 h events noted           PAST HISTORY  --------------------------------------------------------------------------------  No significant changes to PMH, PSH, FHx, SHx, unless otherwise noted    ALLERGIES & MEDICATIONS  --------------------------------------------------------------------------------  Allergies    No Known Allergies    Intolerances      Standing Inpatient Medications  ampicillin/sulbactam  IVPB 3 Gram(s) IV Intermittent every 6 hours  enoxaparin Injectable 40 milliGRAM(s) SubCutaneous every 24 hours  sodium chloride 0.9%. 1000 milliLiter(s) IV Continuous <Continuous>    PRN Inpatient Medications  acetaminophen     Tablet .. 650 milliGRAM(s) Oral every 6 hours PRN  aluminum hydroxide/magnesium hydroxide/simethicone Suspension 30 milliLiter(s) Oral every 4 hours PRN  melatonin 3 milliGRAM(s) Oral at bedtime PRN  ondansetron Injectable 4 milliGRAM(s) IV Push every 8 hours PRN        VITALS/PHYSICAL EXAM  --------------------------------------------------------------------------------  T(C): 38.9 (09-24-22 @ 00:00), Max: 39.1 (09-23-22 @ 15:15)  HR: 127 (09-24-22 @ 02:00) (127 - 145)  BP: 94/60 (09-24-22 @ 01:00) (94/60 - 114/69)  RR: 18 (09-23-22 @ 15:15) (18 - 18)  SpO2: 91% (09-24-22 @ 02:00) (91% - 99%)  Wt(kg): --        09-23-22 @ 07:01  -  09-24-22 @ 07:00  --------------------------------------------------------  IN: 4500 mL / OUT: 725 mL / NET: 3775 mL      Physical Exam:  	Gen: NAD,  	Pulm: CTA B/L  	CV: S1S2; no rub  	Abd: soft, nontender/nondistended      LABS/STUDIES  --------------------------------------------------------------------------------              12.9   8.41  >-----------<  138      [09-24-22 @ 05:35]              37.3     123  |  95  |  10  ----------------------------<  159      [09-23-22 @ 17:59]  4.0   |  16  |  0.6        Ca     7.0     [09-23-22 @ 17:59]      Mg     1.9     [09-23-22 @ 17:59]      Phos  1.5     [09-23-22 @ 17:59]    TPro  5.9  /  Alb  3.6  /  TBili  0.5  /  DBili  x   /  AST  395  /  ALT  147  /  AlkPhos  66  [09-23-22 @ 05:57]      CK 41326      [09-23-22 @ 17:59]  LDH 1137      [09-23-22 @ 17:59]    Creatinine Trend:  SCr 0.6 [09-23 @ 17:59]  SCr 0.8 [09-23 @ 05:57]  SCr 0.8 [09-22 @ 11:38]  SCr 0.9 [09-22 @ 04:45]  SCr 1.0 [09-21 @ 21:10]    Urinalysis - [09-22-22 @ 03:15]      Color Yellow / Appearance Clear / SG 1.039 / pH 6.5      Gluc 500 mg/dL / Ketone Moderate  / Bili Negative / Urobili 3 mg/dL       Blood Moderate / Protein 30 mg/dL / Leuk Est Negative / Nitrite Negative      RBC 1 / WBC 1 / Hyaline 0 / Gran  / Sq Epi  / Non Sq Epi 2 / Bacteria Negative    Urine Sodium 65.0      [09-23-22 @ 19:30]  Urine Potassium 50      [09-23-22 @ 19:30]  Urine Osmolality 889      [09-23-22 @ 19:30]    Iron 17, TIBC 204, %sat 8      [09-23-22 @ 17:59]  Ferritin 758      [09-23-22 @ 17:59]  TSH 0.70      [09-22-22 @ 11:38]    HBsAg Nonreact      [09-22-22 @ 11:38]  HCV 0.12, Nonreact      [09-22-22 @ 11:38]  HIV Nonreact      [09-23-22 @ 11:49]

## 2022-09-24 NOTE — CONSULT NOTE ADULT - ASSESSMENT
IMPRESSION  Cardiac Tamponade with Pericardial effusion   Myopericarditis reduced EF  Sepsis with Submandibular infection/inflammation    Rhabdomyolysis - worsening CPK  Possible myositis vs other Rheumatological disorder  Hyponatremia    RECOMMENDATIONS  NPO  Emergent OR for Drainage of pericardial effsuion.   Case d/w CTS attending

## 2022-09-24 NOTE — PROGRESS NOTE ADULT - ASSESSMENT
PROBLEMS  #Sepsis (T>101F, Pulse>90) with cervical lymphadenopathy; R/O fascial cellulitis  ampicillin/sulbactam  IVPB 3 Gram(s) IV Intermittent every 6 hours  Pt with less right fascial swelling; Tmax lower    CT neck with asymmetrically enlarged right submandibular and right anterior upper jugular lymph nodes that maintain reniform appearance. Associated adjacent soft tissue changes without drainable fluid collection. Etiology   includes infectious/inflammatory.  Cxray negative  9/21 B/C x2 negative  ESR 6, .9    New problem with additional W/U  acute illness with systemic symptoms    #Rhabdomyolysis  CPK 5941 to 21,504  #Hyponatremia Na 125 to 130  #Transaminitis worsening  RUQ sono with increased hepatic echogenicity compatible with hepatic steatosis or hepatocellular disease.  Hep A, B, C serologies negative  EBV PCR negative  Toxo IgM negative      PLAN  - unclear etiology -- viral myositis, inflammatory?  - check RVP   - check CAROLE, RF, CMV IgM/IgG, CMV serum PCR  - no clear exposure - check Lepto-ab for completness  - follow-up quantiferon gold, Parvovirus IgM  - Continue Unasyn 3gm q6h IVPB  - Repeat sodium, wbc, CPK, trend LFTs  - Dental consult

## 2022-09-24 NOTE — CONSULT NOTE ADULT - ASSESSMENT
IMPRESSION  Cardiac Tamponade with Pericardial effusion   Myopericarditis reduced EF  Sepsis with Submandibular infection/inflammation    Rhabdomyolysis - worsening CPK  Possible myositis vs other Rheumatological disorder  Hyponatremia    RECOMMENDATIONS  Spoke with CT surgery. Pt will need pericardiocentesis and possible pericardial window  monitor hemodynamics and fluid status  decrease I/V fluids rate post pericardiocentesis. Monitor CK levels   f/u official 2D echo report  start  Cochicine 0.6 BID and aspirin 325mg qd if no contraindication   ARB/Entresto when stable and cleared by nephro   BB once euvolemic  will need additional workup possible CCTA and CMR once stable   r/o vasculitis and rheumatological cause  consult Dr Watson for cardiomyopathy

## 2022-09-24 NOTE — CHART NOTE - NSCHARTNOTESELECT_GEN_ALL_CORE
Event Note
Event Note
discussion/Event Note
Event Note
Event Note
Transfer Note
rheumatology/Event Note

## 2022-09-24 NOTE — CONSULT NOTE ADULT - NS ATTEND AMEND GEN_ALL_CORE FT
Seen and reviewed. Flexible laryngoscopy with mild tonsillar hypertrophy, airway patent, no inflammatory changes to upper aerodigestive tract to suggest infection. On exam, there is mild nontender induration of right midfacial tissues, no skin changes, nothing to suggest cellulitis. Remainder of ENT exam normal.    Overall doubt infectious source in head and neck. Suggest CT neck to further evaluate. Airway patent.
23yo M with moderate-large pericardial effusion and echocardiographic signs of tamponade. Indication for drainage of pericardial effusion, creation of pericardial window and fluid+tissue analysis for gram stain and cytology.

## 2022-09-24 NOTE — PROGRESS NOTE ADULT - ASSESSMENT
21 yo M with no significant PMHx presents to the ED c/o R sided lower dental pain x 1 week with development of fever/diffuse myalgias x 3 days. Pt had gone work on Monday in his usual state of health. When he came home that night he had begun complaining of aches and feeling unwell. He was found to have right jaw soft tissue infection, in severe sepsis and was started on abx.  # rhabdomyolysis / soft tissue infection   continue IVF NS at 200 cc per hour  UO noted   maintain - 250 cc/h   would not give bicarbonate yet , will exacerbate hypocalcemia   ph noted unusual in the setting of rhabdo , usually high , ? underlying hypophosphatemia/ hypokalemia   check lytes q 8 h   follow CK level   will follow

## 2022-09-24 NOTE — CHART NOTE - NSCHARTNOTEFT_GEN_A_CORE
Spoke to Nephro elsayegh: recc start bicarb ggt at 100 cc/hr. CMP q8 for na and ca monitoring. FOllow up her note today. Spoke to Nephro elsayegh: recc start bicarb ggt at 100 cc/hr. CMP q8 for na and ca monitoring. FOllow up her note today.    Update: Spoke to nephro again she recc DC bicarb ggt and start pO bicarb.

## 2022-09-24 NOTE — CHART NOTE - NSCHARTNOTEFT_GEN_A_CORE
Rheumatology consulted for elevated CPK with concern for inflammatory myositis.     22 year old male who was admitted for right sided facial pain for 1 week with fevers and myalgias for three days prior to admission associated with difficulty walking, nausea, poor appetite. He was tested negative for covid at urgent care prior. CT neck showed enlarged rt submandibular and anterior upper jugular lymph nodes w/ adjacent soft tissue changes and he was started on unasyn. CXR unremarkable, transaminitis, CPK 5941, UA with +blood, +protein, glucose and ketones, blood cultures, urine cultures, covid, influenza,  HIV, EBV, utox, negative. He became febrile, tachycardic, hyponatremic with worsening CPK 24366 despite IVF, with elevated LDH and ferritin. ESR is normal and . CTA chest showed small bilateral pleural effusions, bilateral GGO and patchy opacities, interlobular septal thickening likely on basis of pulmonary edema, pericardial effusion.     Elevated CPK, fever, tachycardia, serositis, elevated CRP  -Differentials include infectious (work up unrevealing so far) and inflammatory including myositis given elevated CPK, connective tissue disease such as SLE, RA, Sjogrens, etc. and vasculitis.   -Blood and urine cultures negative; f/u Parvovius antibody  -Check myomarker panel, SRP antibody, HMG-CoA reductase antibody  -Check RF, CCP, CAROLE, dsDNA, Krishnamurthy, RNP, C3, C4, ANCA, SSA, SSB, Scleroderma antibody, centromere antibody, aldolase, TB quantiferon  -Trend CPK  -Start solumedrol 1000 mg daily x 3 days and then 1 mg/kg daily after  -Discussed case with hospitalist team Dr. Miramontes and ICU team Dr. Zamudio Rheumatology consulted for elevated CPK with concern for inflammatory myositis.     22 year old male who was admitted for right sided facial pain for 1 week with fevers and myalgias for three days prior to admission associated with difficulty walking, nausea, poor appetite. He was tested negative for covid at urgent care prior. CT neck showed enlarged rt submandibular and anterior upper jugular lymph nodes w/ adjacent soft tissue changes and he was started on unasyn. CXR unremarkable, transaminitis, CPK 5941, UA with +blood, +protein, glucose and ketones, blood cultures, urine cultures, covid, influenza,  HIV, EBV, utox, negative. He became febrile, tachycardic, hyponatremic with worsening CPK 96267 despite IVF, with elevated LDH and ferritin. ESR is normal and . CTA chest showed small bilateral pleural effusions, bilateral GGO and patchy opacities, interlobular septal thickening likely on basis of pulmonary edema, pericardial effusion.     Elevated CPK, fever, tachycardia, serositis, elevated CRP  -Differentials include infectious (work up unrevealing so far) and inflammatory including myositis given elevated CPK, connective tissue disease such as SLE, RA, Sjogrens, etc. and vasculitis.   -Blood and urine cultures negative; f/u Parvovius antibody  -Check myomarker panel, SRP antibody, HMG-CoA reductase antibody  -Check RF, CCP, CAROLE, dsDNA, Krishnamurthy, RNP, C3, C4, ANCA, SSA, SSB, Scleroderma antibody, centromere antibody, aldolase, TB quantiferon  -Trend CPK  -Start solumedrol 1000 mg daily x 3 days   -Discussed case with hospitalist team Dr. Miramontes and ICU team Dr. Zamudio Rheumatology consulted for elevated CPK with concern for inflammatory myositis.     22 year old male who was admitted for right sided facial pain for 1 week with fevers and myalgias for three days prior to admission associated with difficulty walking, nausea, poor appetite. He was tested negative for covid at urgent care prior. CT neck showed enlarged rt submandibular and anterior upper jugular lymph nodes w/ adjacent soft tissue changes and he was started on unasyn. CXR unremarkable, transaminitis, CPK 5941, UA with +blood, +protein, glucose and ketones, blood cultures, urine cultures, covid, influenza,  HIV, EBV, utox, negative. He became febrile, tachycardic, hyponatremic with worsening CPK 98486 despite IVF, with elevated LDH and ferritin. ESR is normal and . CTA chest showed small bilateral pleural effusions, bilateral GGO and patchy opacities, interlobular septal thickening likely on basis of pulmonary edema, pericardial effusion.     Elevated CPK, fever, tachycardia, serositis, elevated CRP  -Differentials include infectious (work up unrevealing so far) and inflammatory including myositis given elevated CPK, connective tissue diseases such as SLE, RA, Sjogrens, etc. and vasculitis.   -Blood and urine cultures negative; f/u Parvovius antibody  -Check myomarker panel, SRP antibody, HMG-CoA reductase antibody  -Check RF, CCP, CAROLE, dsDNA, Krishnamurthy, RNP, C3, C4, ANCA, SSA, SSB, Scleroderma antibody, centromere antibody, aldolase, TB quantiferon  -Trend CPK  -TTE pending read  -Start solumedrol 1000 mg daily x 3 days   -Discussed case with hospitalist team Dr. Miramontes and ICU team Dr. Zamudio Rheumatology consulted for elevated CPK with concern for inflammatory myositis.     22 year old male who was admitted for right sided facial pain for 1 week with fevers and myalgias for three days prior to admission associated with difficulty walking, nausea, poor appetite. He was tested negative for covid at urgent care prior. CT neck showed enlarged rt submandibular and anterior upper jugular lymph nodes w/ adjacent soft tissue changes and he was started on unasyn. CXR unremarkable, transaminitis, CPK 5941, UA with +blood, +protein, glucose and ketones, blood cultures, urine cultures, covid, influenza,  HIV, EBV, utox, negative. He became febrile, tachycardic, hyponatremic with worsening CPK 63101 despite IVF, with elevated LDH and ferritin. ESR is normal and . CTA chest showed small bilateral pleural effusions, bilateral GGO and patchy opacities, interlobular septal thickening likely on basis of pulmonary edema, pericardial effusion.     Elevated CPK, fever, tachycardia, serositis, elevated CRP  -Differentials include infectious (work up unrevealing so far) and inflammatory including myositis given elevated CPK, connective tissue diseases such as SLE, RA, Sjogrens, etc. and vasculitis.   -Blood and urine cultures negative; f/u Parvovius antibody  -Check myomarker panel, kevin-1 antibody, SRP antibody, HMG-CoA reductase antibody  -Check RF, CCP, CAROLE, dsDNA, Krishnamurthy, RNP, C3, C4, ANCA, SSA, SSB, Scleroderma antibody, centromere antibody, aldolase, TB quantiferon  -Trend CPK  -TTE pending read  -Start solumedrol 1000 mg daily x 3 days   -Discussed case with hospitalist team Dr. Miramontes and ICU team Dr. Zamudio

## 2022-09-24 NOTE — PROGRESS NOTE ADULT - SUBJECTIVE AND OBJECTIVE BOX
Patient is a 22y old  Male who presents with a chief complaint of fever, facial swelling (24 Sep 2022 09:00)        Over Night Events:    Events noted. febrile and Tachy. Subjectively stated the pain is improved. Translated by his brother    ROS:     All ROS are negative except HPI         PHYSICAL EXAM    ICU Vital Signs Last 24 Hrs  T(C): 36.9 (24 Sep 2022 08:00), Max: 39.1 (23 Sep 2022 15:15)  T(F): 98.5 (24 Sep 2022 08:00), Max: 102.3 (23 Sep 2022 15:15)  HR: 130 (24 Sep 2022 09:00) (120 - 145)  BP: 131/84 (24 Sep 2022 09:00) (84/56 - 131/84)  BP(mean): 99 (24 Sep 2022 09:00) (65 - 99)  ABP: --  ABP(mean): --  RR: 18 (23 Sep 2022 15:15) (18 - 18)  SpO2: 98% (24 Sep 2022 09:00) (91% - 98%)    O2 Parameters below as of 24 Sep 2022 08:00  Patient On (Oxygen Delivery Method): room air            CONSTITUTIONAL:  Well nourished.  in NAD    ENT:   Airway patent,   Mouth with normal mucosa.    EYES:   Pupils equal,   Round and reactive to light.    CARDIAC:   tachy  Regular rhythm.    No edema      Vascular:  Normal systolic impulse  No Carotid bruits    RESPIRATORY:   No wheezing  Bilateral BS  Normal chest expansion  Not tachypneic,  No use of accessory muscles    GASTROINTESTINAL:  Abdomen soft,   Non-tender,   No guarding,   + BS    MUSCULOSKELETAL:   Range of motion is not limited,  No clubbing, cyanosis    NEUROLOGICAL:   Alert and oriented   No motor  deficits.    SKIN:   Skin normal color for race,   Warm and dry and intact.   No evidence of rash.    PSYCHIATRIC:   Normal mood and affect.   No apparent risk to self or others.    HEMATOLOGICAL:  No cervical  lymphadenopathy.  no inguinal lymphadenopathy      09-23-22 @ 07:01  -  09-24-22 @ 07:00  --------------------------------------------------------  IN:    sodium chloride 0.9%: 4600 mL    Sodium Chloride 0.9% Bolus: 500 mL  Total IN: 5100 mL    OUT:    Indwelling Catheter - Urethral (mL): 1525 mL  Total OUT: 1525 mL    Total NET: 3575 mL      09-24-22 @ 07:01  -  09-24-22 @ 10:28  --------------------------------------------------------  IN:    Oral Fluid: 118 mL    sodium chloride 0.9%: 400 mL  Total IN: 518 mL    OUT:    Indwelling Catheter - Urethral (mL): 600 mL  Total OUT: 600 mL    Total NET: -82 mL      LABS:                          12.9   8.41  )-----------( 138      ( 24 Sep 2022 05:35 )             37.3                                               09-24    120<L>  |  92<L>  |  10  ----------------------------<  125<H>  4.1   |  12<L>  |  0.6<L>    Ca    7.0<L>      24 Sep 2022 05:35  Phos  1.5     09-23  Mg     1.9     09-23    TPro  5.0<L>  /  Alb  2.8<L>  /  TBili  0.3  /  DBili  x   /  AST  546<H>  /  ALT  232<H>  /  AlkPhos  61  09-24                                                 CARDIAC MARKERS ( 24 Sep 2022 05:35 )  x     / x     / 03963 U/L / x     / x      CARDIAC MARKERS ( 23 Sep 2022 17:59 )  x     / x     / 55338 U/L / x     / x      CARDIAC MARKERS ( 23 Sep 2022 05:57 )  x     / x     / 11020 U/L / x     / x      CARDIAC MARKERS ( 22 Sep 2022 17:54 )  x     / x     / 55083 U/L / x     / x      CARDIAC MARKERS ( 22 Sep 2022 11:38 )  x     / x     / 40254 U/L / x     / x                                                LIVER FUNCTIONS - ( 24 Sep 2022 05:35 )  Alb: 2.8 g/dL / Pro: 5.0 g/dL / ALK PHOS: 61 U/L / ALT: 232 U/L / AST: 546 U/L / GGT: x                                                  Culture - Urine (collected 22 Sep 2022 03:15)  Source: Clean Catch Clean Catch (Midstream)  Final Report (23 Sep 2022 14:50):    No growth    Culture - Blood (collected 21 Sep 2022 21:10)  Source: .Blood Blood-Peripheral  Preliminary Report (23 Sep 2022 02:04):    No growth to date.    Culture - Blood (collected 21 Sep 2022 21:10)  Source: .Blood Blood-Peripheral  Preliminary Report (23 Sep 2022 02:04):    No growth to date.                                                                                       ABG - ( 24 Sep 2022 08:16 )  pH, Arterial: 7.45  pH, Blood: x     /  pCO2: 19    /  pO2: 95    / HCO3: 13    / Base Excess: -8.5  /  SaO2: 99.9        MEDICATIONS  (STANDING):  ampicillin/sulbactam  IVPB 3 Gram(s) IV Intermittent every 6 hours  enoxaparin Injectable 40 milliGRAM(s) SubCutaneous every 24 hours  sodium chloride 0.9%. 1000 milliLiter(s) (100 mL/Hr) IV Continuous <Continuous>  sodium phosphate IVPB 30 milliMole(s) IV Intermittent once    MEDICATIONS  (PRN):  acetaminophen     Tablet .. 650 milliGRAM(s) Oral every 6 hours PRN Temp greater or equal to 38C (100.4F), Mild Pain (1 - 3)  aluminum hydroxide/magnesium hydroxide/simethicone Suspension 30 milliLiter(s) Oral every 4 hours PRN Dyspepsia  melatonin 3 milliGRAM(s) Oral at bedtime PRN Insomnia  ondansetron Injectable 4 milliGRAM(s) IV Push every 8 hours PRN Nausea and/or Vomiting     Patient is a 22y old  Male who presents with a chief complaint of fever, facial swelling (24 Sep 2022 09:00)        Over Night Events:    Events noted. febrile and Tachy. Subjectively stated the pain is improved. Translated by his brother, CT angio reviewed, still with good UO    PHYSICAL EXAM    ICU Vital Signs Last 24 Hrs  T(C): 36.9 (24 Sep 2022 08:00), Max: 39.1 (23 Sep 2022 15:15)  T(F): 98.5 (24 Sep 2022 08:00), Max: 102.3 (23 Sep 2022 15:15)  HR: 130 (24 Sep 2022 09:00) (120 - 145)  BP: 131/84 (24 Sep 2022 09:00) (84/56 - 131/84)  BP(mean): 99 (24 Sep 2022 09:00) (65 - 99)  RR: 18 (23 Sep 2022 15:15) (18 - 18)  SpO2: 98% (24 Sep 2022 09:00) (91% - 98%)    O2 Parameters below as of 24 Sep 2022 08:00  Patient On (Oxygen Delivery Method): room air            CONSTITUTIONAL:  ILL looking    ENT:   Airway patent,   Mouth with normal mucosa.    EYES:   Pupils equal,   Round and reactive to light.    CARDIAC:   tachy  Regular rhythm.    No edema      RESPIRATORY:   Dec bs both bases    GASTROINTESTINAL:  Abdomen soft,   Non-tender,   No guarding,   + BS    MUSCULOSKELETAL:   Range of motion is not limited,  No clubbing, cyanosis    NEUROLOGICAL:   Alert and oriented   No motor  deficits.    PSYCHIATRIC:   Normal mood and affect.   No apparent risk to self or others.        09-23-22 @ 07:01  -  09-24-22 @ 07:00  --------------------------------------------------------  IN:    sodium chloride 0.9%: 4600 mL    Sodium Chloride 0.9% Bolus: 500 mL  Total IN: 5100 mL    OUT:    Indwelling Catheter - Urethral (mL): 1525 mL  Total OUT: 1525 mL    Total NET: 3575 mL      09-24-22 @ 07:01  -  09-24-22 @ 10:28  --------------------------------------------------------  IN:    Oral Fluid: 118 mL    sodium chloride 0.9%: 400 mL  Total IN: 518 mL    OUT:    Indwelling Catheter - Urethral (mL): 600 mL  Total OUT: 600 mL    Total NET: -82 mL      LABS:                          12.9   8.41  )-----------( 138      ( 24 Sep 2022 05:35 )             37.3                                               09-24    120<L>  |  92<L>  |  10  ----------------------------<  125<H>  4.1   |  12<L>  |  0.6<L>    Ca    7.0<L>      24 Sep 2022 05:35  Phos  1.5     09-23  Mg     1.9     09-23    TPro  5.0<L>  /  Alb  2.8<L>  /  TBili  0.3  /  DBili  x   /  AST  546<H>  /  ALT  232<H>  /  AlkPhos  61  09-24                                                 CARDIAC MARKERS ( 24 Sep 2022 05:35 )  x     / x     / 01936 U/L / x     / x      CARDIAC MARKERS ( 23 Sep 2022 17:59 )  x     / x     / 29957 U/L / x     / x      CARDIAC MARKERS ( 23 Sep 2022 05:57 )  x     / x     / 48155 U/L / x     / x      CARDIAC MARKERS ( 22 Sep 2022 17:54 )  x     / x     / 74969 U/L / x     / x      CARDIAC MARKERS ( 22 Sep 2022 11:38 )  x     / x     / 73835 U/L / x     / x                                                LIVER FUNCTIONS - ( 24 Sep 2022 05:35 )  Alb: 2.8 g/dL / Pro: 5.0 g/dL / ALK PHOS: 61 U/L / ALT: 232 U/L / AST: 546 U/L / GGT: x                                                  Culture - Urine (collected 22 Sep 2022 03:15)  Source: Clean Catch Clean Catch (Midstream)  Final Report (23 Sep 2022 14:50):    No growth    Culture - Blood (collected 21 Sep 2022 21:10)  Source: .Blood Blood-Peripheral  Preliminary Report (23 Sep 2022 02:04):    No growth to date.    Culture - Blood (collected 21 Sep 2022 21:10)  Source: .Blood Blood-Peripheral  Preliminary Report (23 Sep 2022 02:04):    No growth to date.                                                                                       ABG - ( 24 Sep 2022 08:16 )  pH, Arterial: 7.45  pH, Blood: x     /  pCO2: 19    /  pO2: 95    / HCO3: 13    / Base Excess: -8.5  /  SaO2: 99.9        MEDICATIONS  (STANDING):  ampicillin/sulbactam  IVPB 3 Gram(s) IV Intermittent every 6 hours  enoxaparin Injectable 40 milliGRAM(s) SubCutaneous every 24 hours  sodium chloride 0.9%. 1000 milliLiter(s) (100 mL/Hr) IV Continuous <Continuous>  sodium phosphate IVPB 30 milliMole(s) IV Intermittent once    MEDICATIONS  (PRN):  acetaminophen     Tablet .. 650 milliGRAM(s) Oral every 6 hours PRN Temp greater or equal to 38C (100.4F), Mild Pain (1 - 3)  aluminum hydroxide/magnesium hydroxide/simethicone Suspension 30 milliLiter(s) Oral every 4 hours PRN Dyspepsia  melatonin 3 milliGRAM(s) Oral at bedtime PRN Insomnia  ondansetron Injectable 4 milliGRAM(s) IV Push every 8 hours PRN Nausea and/or Vomiting

## 2022-09-24 NOTE — PROGRESS NOTE ADULT - SUBJECTIVE AND OBJECTIVE BOX
Pt seen and examined at bedside. No CP or SOB. Tachy cardiac overnight       ABG - ( 24 Sep 2022 08:16 )  pH: 7.45  /  pCO2: 19    /  pO2: 95    / HCO3: 13    / Base Excess: -8.5  /  SaO2: 99.9          PAST MEDICAL & SURGICAL HISTORY:  No pertinent past medical history    No significant past surgical history        VITAL SIGNS (Last 24 hrs):  T(C): 36.9 (09-24-22 @ 08:00), Max: 39.1 (09-23-22 @ 15:15)  HR: 138 (09-24-22 @ 12:00) (120 - 145)  BP: 133/86 (09-24-22 @ 12:00) (84/56 - 133/86)  RR: 18 (09-23-22 @ 15:15) (18 - 18)  SpO2: 98% (09-24-22 @ 12:00) (91% - 98%)  Wt(kg): --  Daily     Daily     I&O's Summary    23 Sep 2022 07:01  -  24 Sep 2022 07:00  --------------------------------------------------------  IN: 5100 mL / OUT: 1525 mL / NET: 3575 mL    24 Sep 2022 07:01  -  24 Sep 2022 12:52  --------------------------------------------------------  IN: 918 mL / OUT: 800 mL / NET: 118 mL        PHYSICAL EXAM:  GENERAL: NAD, well-developed  HEAD:  Atraumatic, Normocephalic  EYES: EOMI, PERRLA, conjunctiva and sclera clear  NECK: Supple, No JVD  CHEST/LUNG: Clear to auscultation bilaterally; No wheeze  HEART: Regular rate and rhythm; No murmurs, rubs, or gallops  ABDOMEN: Soft, Nontender, Nondistended; Bowel sounds present  EXTREMITIES:  2+ Peripheral Pulses, No clubbing, cyanosis, or edema  PSYCH: AAOx3  NEUROLOGY: non-focal  SKIN: No rashes or lesions    Labs Reviewed  Spoke to patient in regards to abnormal labs.    CBC Full  -  ( 24 Sep 2022 05:35 )  WBC Count : 8.41 K/uL  Hemoglobin : 12.9 g/dL  Hematocrit : 37.3 %  Platelet Count - Automated : 138 K/uL  Mean Cell Volume : 79.2 fL  Mean Cell Hemoglobin : 27.4 pg  Mean Cell Hemoglobin Concentration : 34.6 g/dL  Auto Neutrophil # : 6.63 K/uL  Auto Lymphocyte # : 1.26 K/uL  Auto Monocyte # : 0.29 K/uL  Auto Eosinophil # : 0.08 K/uL  Auto Basophil # : 0.00 K/uL  Auto Neutrophil % : 73.5 %  Auto Lymphocyte % : 15.0 %  Auto Monocyte % : 3.5 %  Auto Eosinophil % : 0.9 %  Auto Basophil % : 0.0 %    BMP:    09-24 @ 05:35    Blood Urea Nitrogen - 10  Calcium - 7.0  Carbond Dioxide - 12  Chloride - 92  Creatinine - 0.6  Glucose - 125  Potassium - 4.1  Sodium - 120      Hemoglobin A1c -   PT/INR - ( 21 Sep 2022 22:31 )   PT: 14.30 sec;   INR: 1.25 ratio         PTT - ( 21 Sep 2022 22:31 )  PTT:31.7 sec  Urine Culture:  09-22 @ 03:15 Urine culture: --    Culture Results:   No growth  Method Type: --  Organism: --  Organism Identification: --  Specimen Source: Clean Catch Clean Catch (Midstream)  09-21 @ 21:10 Urine culture: --    Culture Results:   No growth to date.  Method Type: --  Organism: --  Organism Identification: --  Specimen Source: .Blood Blood-Peripheral        COVID Labs  CRP:  151.9 (09-22-22)      D-Dimer:  1026 ng/mL DDU (09-23-22 @ 11:49)    Ferritin, Serum: 758 ng/mL (09-23-22 @ 17:59)          Imaging reviewed independently and reviewed read  < from: CT Angio Chest PE Protocol w/ IV Cont (09.24.22 @ 00:53) >  IMPRESSION:    No evidence of acute pulmonary embolus.    Small bilateral pleural effusions.    Bilateral groundglass and patchy opacities and interlobular septal   thickening, likely on the basis of pulmonary edema.    Pericardial effusion.    < end of copied text >        MEDICATIONS  (STANDING):  ampicillin/sulbactam  IVPB 3 Gram(s) IV Intermittent every 6 hours  enoxaparin Injectable 40 milliGRAM(s) SubCutaneous every 24 hours  methylPREDNISolone sodium succinate IVPB 1000 milliGRAM(s) IV Intermittent once    MEDICATIONS  (PRN):  acetaminophen     Tablet .. 650 milliGRAM(s) Oral every 6 hours PRN Temp greater or equal to 38C (100.4F), Mild Pain (1 - 3)  aluminum hydroxide/magnesium hydroxide/simethicone Suspension 30 milliLiter(s) Oral every 4 hours PRN Dyspepsia  melatonin 3 milliGRAM(s) Oral at bedtime PRN Insomnia  ondansetron Injectable 4 milliGRAM(s) IV Push every 8 hours PRN Nausea and/or Vomiting

## 2022-09-25 LAB
ALBUMIN SERPL ELPH-MCNC: 2.7 G/DL — LOW (ref 3.5–5.2)
ALBUMIN SERPL ELPH-MCNC: 2.8 G/DL — LOW (ref 3.5–5.2)
ALP SERPL-CCNC: 58 U/L — SIGNIFICANT CHANGE UP (ref 30–115)
ALP SERPL-CCNC: 58 U/L — SIGNIFICANT CHANGE UP (ref 30–115)
ALT FLD-CCNC: 238 U/L — HIGH (ref 0–41)
ALT FLD-CCNC: 241 U/L — HIGH (ref 0–41)
ANION GAP SERPL CALC-SCNC: 10 MMOL/L — SIGNIFICANT CHANGE UP (ref 7–14)
ANION GAP SERPL CALC-SCNC: 10 MMOL/L — SIGNIFICANT CHANGE UP (ref 7–14)
ANION GAP SERPL CALC-SCNC: 11 MMOL/L — SIGNIFICANT CHANGE UP (ref 7–14)
APTT BLD: 28.6 SEC — SIGNIFICANT CHANGE UP (ref 27–39.2)
AST SERPL-CCNC: 391 U/L — HIGH (ref 0–41)
AST SERPL-CCNC: 427 U/L — HIGH (ref 0–41)
B-OH-BUTYR SERPL-SCNC: 0.7 MMOL/L — HIGH
BASOPHILS # BLD AUTO: 0.01 K/UL — SIGNIFICANT CHANGE UP (ref 0–0.2)
BASOPHILS # BLD AUTO: 0.01 K/UL — SIGNIFICANT CHANGE UP (ref 0–0.2)
BASOPHILS NFR BLD AUTO: 0.1 % — SIGNIFICANT CHANGE UP (ref 0–1)
BASOPHILS NFR BLD AUTO: 0.1 % — SIGNIFICANT CHANGE UP (ref 0–1)
BILIRUB SERPL-MCNC: 0.2 MG/DL — SIGNIFICANT CHANGE UP (ref 0.2–1.2)
BILIRUB SERPL-MCNC: 0.3 MG/DL — SIGNIFICANT CHANGE UP (ref 0.2–1.2)
BUN SERPL-MCNC: 15 MG/DL — SIGNIFICANT CHANGE UP (ref 10–20)
CALCIUM SERPL-MCNC: 7 MG/DL — LOW (ref 8.4–10.5)
CALCIUM SERPL-MCNC: 7.1 MG/DL — LOW (ref 8.4–10.5)
CALCIUM SERPL-MCNC: 7.4 MG/DL — LOW (ref 8.4–10.5)
CHLORIDE SERPL-SCNC: 104 MMOL/L — SIGNIFICANT CHANGE UP (ref 98–110)
CHLORIDE SERPL-SCNC: 105 MMOL/L — SIGNIFICANT CHANGE UP (ref 98–110)
CHLORIDE SERPL-SCNC: 107 MMOL/L — SIGNIFICANT CHANGE UP (ref 98–110)
CK SERPL-CCNC: HIGH U/L (ref 0–225)
CK SERPL-CCNC: HIGH U/L (ref 0–225)
CO2 SERPL-SCNC: 18 MMOL/L — SIGNIFICANT CHANGE UP (ref 17–32)
CO2 SERPL-SCNC: 20 MMOL/L — SIGNIFICANT CHANGE UP (ref 17–32)
CO2 SERPL-SCNC: 20 MMOL/L — SIGNIFICANT CHANGE UP (ref 17–32)
CREAT SERPL-MCNC: 0.5 MG/DL — LOW (ref 0.7–1.5)
EGFR: 149 ML/MIN/1.73M2 — SIGNIFICANT CHANGE UP
EOSINOPHIL # BLD AUTO: 0 K/UL — SIGNIFICANT CHANGE UP (ref 0–0.7)
EOSINOPHIL # BLD AUTO: 0.01 K/UL — SIGNIFICANT CHANGE UP (ref 0–0.7)
EOSINOPHIL NFR BLD AUTO: 0 % — SIGNIFICANT CHANGE UP (ref 0–8)
EOSINOPHIL NFR BLD AUTO: 0.1 % — SIGNIFICANT CHANGE UP (ref 0–8)
FERRITIN SERPL-MCNC: 1084 NG/ML — HIGH (ref 30–400)
GLUCOSE BLDC GLUCOMTR-MCNC: 131 MG/DL — HIGH (ref 70–99)
GLUCOSE BLDC GLUCOMTR-MCNC: 138 MG/DL — HIGH (ref 70–99)
GLUCOSE BLDC GLUCOMTR-MCNC: 141 MG/DL — HIGH (ref 70–99)
GLUCOSE BLDC GLUCOMTR-MCNC: 150 MG/DL — HIGH (ref 70–99)
GLUCOSE SERPL-MCNC: 130 MG/DL — HIGH (ref 70–99)
GLUCOSE SERPL-MCNC: 139 MG/DL — HIGH (ref 70–99)
GLUCOSE SERPL-MCNC: 154 MG/DL — HIGH (ref 70–99)
HCT VFR BLD CALC: 33 % — LOW (ref 42–52)
HCT VFR BLD CALC: 33.5 % — LOW (ref 42–52)
HGB BLD-MCNC: 11.4 G/DL — LOW (ref 14–18)
HGB BLD-MCNC: 11.9 G/DL — LOW (ref 14–18)
IMM GRANULOCYTES NFR BLD AUTO: 0.5 % — HIGH (ref 0.1–0.3)
IMM GRANULOCYTES NFR BLD AUTO: 0.6 % — HIGH (ref 0.1–0.3)
INR BLD: 1.17 RATIO — SIGNIFICANT CHANGE UP (ref 0.65–1.3)
LDH SERPL L TO P-CCNC: 1326 U/L — HIGH (ref 50–242)
LYMPHOCYTES # BLD AUTO: 1.2 K/UL — SIGNIFICANT CHANGE UP (ref 1.2–3.4)
LYMPHOCYTES # BLD AUTO: 1.24 K/UL — SIGNIFICANT CHANGE UP (ref 1.2–3.4)
LYMPHOCYTES # BLD AUTO: 14.9 % — LOW (ref 20.5–51.1)
LYMPHOCYTES # BLD AUTO: 16.3 % — LOW (ref 20.5–51.1)
MAGNESIUM SERPL-MCNC: 2.5 MG/DL — HIGH (ref 1.8–2.4)
MAGNESIUM SERPL-MCNC: 2.5 MG/DL — HIGH (ref 1.8–2.4)
MCHC RBC-ENTMCNC: 27 PG — SIGNIFICANT CHANGE UP (ref 27–31)
MCHC RBC-ENTMCNC: 27.9 PG — SIGNIFICANT CHANGE UP (ref 27–31)
MCHC RBC-ENTMCNC: 34.5 G/DL — SIGNIFICANT CHANGE UP (ref 32–37)
MCHC RBC-ENTMCNC: 35.5 G/DL — SIGNIFICANT CHANGE UP (ref 32–37)
MCV RBC AUTO: 78.2 FL — LOW (ref 80–94)
MCV RBC AUTO: 78.5 FL — LOW (ref 80–94)
MONOCYTES # BLD AUTO: 0.21 K/UL — SIGNIFICANT CHANGE UP (ref 0.1–0.6)
MONOCYTES # BLD AUTO: 0.23 K/UL — SIGNIFICANT CHANGE UP (ref 0.1–0.6)
MONOCYTES NFR BLD AUTO: 2.8 % — SIGNIFICANT CHANGE UP (ref 1.7–9.3)
MONOCYTES NFR BLD AUTO: 2.9 % — SIGNIFICANT CHANGE UP (ref 1.7–9.3)
NEUTROPHILS # BLD AUTO: 6.1 K/UL — SIGNIFICANT CHANGE UP (ref 1.4–6.5)
NEUTROPHILS # BLD AUTO: 6.56 K/UL — HIGH (ref 1.4–6.5)
NEUTROPHILS NFR BLD AUTO: 80.3 % — HIGH (ref 42.2–75.2)
NEUTROPHILS NFR BLD AUTO: 81.4 % — HIGH (ref 42.2–75.2)
NRBC # BLD: 0 /100 WBCS — SIGNIFICANT CHANGE UP (ref 0–0)
NRBC # BLD: 0 /100 WBCS — SIGNIFICANT CHANGE UP (ref 0–0)
PHOSPHATE SERPL-MCNC: 2.7 MG/DL — SIGNIFICANT CHANGE UP (ref 2.1–4.9)
PLATELET # BLD AUTO: 140 K/UL — SIGNIFICANT CHANGE UP (ref 130–400)
PLATELET # BLD AUTO: 153 K/UL — SIGNIFICANT CHANGE UP (ref 130–400)
POTASSIUM SERPL-MCNC: 3.7 MMOL/L — SIGNIFICANT CHANGE UP (ref 3.5–5)
POTASSIUM SERPL-MCNC: 3.7 MMOL/L — SIGNIFICANT CHANGE UP (ref 3.5–5)
POTASSIUM SERPL-MCNC: 3.9 MMOL/L — SIGNIFICANT CHANGE UP (ref 3.5–5)
POTASSIUM SERPL-SCNC: 3.7 MMOL/L — SIGNIFICANT CHANGE UP (ref 3.5–5)
POTASSIUM SERPL-SCNC: 3.7 MMOL/L — SIGNIFICANT CHANGE UP (ref 3.5–5)
POTASSIUM SERPL-SCNC: 3.9 MMOL/L — SIGNIFICANT CHANGE UP (ref 3.5–5)
PROT SERPL-MCNC: 4.7 G/DL — LOW (ref 6–8)
PROT SERPL-MCNC: 4.8 G/DL — LOW (ref 6–8)
PROTHROM AB SERPL-ACNC: 13.4 SEC — HIGH (ref 9.95–12.87)
RAPID RVP RESULT: SIGNIFICANT CHANGE UP
RBC # BLD: 4.22 M/UL — LOW (ref 4.7–6.1)
RBC # BLD: 4.27 M/UL — LOW (ref 4.7–6.1)
RBC # FLD: 13.2 % — SIGNIFICANT CHANGE UP (ref 11.5–14.5)
RBC # FLD: 13.4 % — SIGNIFICANT CHANGE UP (ref 11.5–14.5)
SARS-COV-2 RNA SPEC QL NAA+PROBE: SIGNIFICANT CHANGE UP
SODIUM SERPL-SCNC: 134 MMOL/L — LOW (ref 135–146)
SODIUM SERPL-SCNC: 135 MMOL/L — SIGNIFICANT CHANGE UP (ref 135–146)
SODIUM SERPL-SCNC: 136 MMOL/L — SIGNIFICANT CHANGE UP (ref 135–146)
WBC # BLD: 7.6 K/UL — SIGNIFICANT CHANGE UP (ref 4.8–10.8)
WBC # BLD: 8.06 K/UL — SIGNIFICANT CHANGE UP (ref 4.8–10.8)
WBC # FLD AUTO: 7.6 K/UL — SIGNIFICANT CHANGE UP (ref 4.8–10.8)
WBC # FLD AUTO: 8.06 K/UL — SIGNIFICANT CHANGE UP (ref 4.8–10.8)

## 2022-09-25 PROCEDURE — 99255 IP/OBS CONSLTJ NEW/EST HI 80: CPT

## 2022-09-25 PROCEDURE — 93010 ELECTROCARDIOGRAM REPORT: CPT

## 2022-09-25 PROCEDURE — 71045 X-RAY EXAM CHEST 1 VIEW: CPT | Mod: 26

## 2022-09-25 PROCEDURE — 99232 SBSQ HOSP IP/OBS MODERATE 35: CPT

## 2022-09-25 RX ORDER — POTASSIUM CHLORIDE 20 MEQ
40 PACKET (EA) ORAL ONCE
Refills: 0 | Status: COMPLETED | OUTPATIENT
Start: 2022-09-25 | End: 2022-09-25

## 2022-09-25 RX ORDER — PANTOPRAZOLE SODIUM 20 MG/1
40 TABLET, DELAYED RELEASE ORAL
Refills: 0 | Status: DISCONTINUED | OUTPATIENT
Start: 2022-09-25 | End: 2022-09-28

## 2022-09-25 RX ORDER — METOPROLOL TARTRATE 50 MG
12.5 TABLET ORAL EVERY 12 HOURS
Refills: 0 | Status: DISCONTINUED | OUTPATIENT
Start: 2022-09-25 | End: 2022-09-28

## 2022-09-25 RX ORDER — IBUPROFEN 200 MG
400 TABLET ORAL EVERY 8 HOURS
Refills: 0 | Status: COMPLETED | OUTPATIENT
Start: 2022-09-25 | End: 2022-09-28

## 2022-09-25 RX ADMIN — AMPICILLIN SODIUM AND SULBACTAM SODIUM 200 GRAM(S): 250; 125 INJECTION, POWDER, FOR SUSPENSION INTRAMUSCULAR; INTRAVENOUS at 05:21

## 2022-09-25 RX ADMIN — Medication 400 MILLIGRAM(S): at 22:25

## 2022-09-25 RX ADMIN — OXYCODONE HYDROCHLORIDE 10 MILLIGRAM(S): 5 TABLET ORAL at 12:30

## 2022-09-25 RX ADMIN — OXYCODONE HYDROCHLORIDE 5 MILLIGRAM(S): 5 TABLET ORAL at 09:23

## 2022-09-25 RX ADMIN — Medication 2 TABLET(S): at 14:44

## 2022-09-25 RX ADMIN — AMPICILLIN SODIUM AND SULBACTAM SODIUM 200 GRAM(S): 250; 125 INJECTION, POWDER, FOR SUSPENSION INTRAMUSCULAR; INTRAVENOUS at 00:26

## 2022-09-25 RX ADMIN — OXYCODONE HYDROCHLORIDE 10 MILLIGRAM(S): 5 TABLET ORAL at 08:17

## 2022-09-25 RX ADMIN — OXYCODONE HYDROCHLORIDE 10 MILLIGRAM(S): 5 TABLET ORAL at 13:00

## 2022-09-25 RX ADMIN — PANTOPRAZOLE SODIUM 40 MILLIGRAM(S): 20 TABLET, DELAYED RELEASE ORAL at 07:25

## 2022-09-25 RX ADMIN — Medication 650 MILLIGRAM(S): at 05:22

## 2022-09-25 RX ADMIN — AMPICILLIN SODIUM AND SULBACTAM SODIUM 200 GRAM(S): 250; 125 INJECTION, POWDER, FOR SUSPENSION INTRAMUSCULAR; INTRAVENOUS at 18:09

## 2022-09-25 RX ADMIN — OXYCODONE HYDROCHLORIDE 10 MILLIGRAM(S): 5 TABLET ORAL at 08:47

## 2022-09-25 RX ADMIN — Medication 400 MILLIGRAM(S): at 21:18

## 2022-09-25 RX ADMIN — Medication 100 MILLIGRAM(S): at 13:20

## 2022-09-25 RX ADMIN — Medication 60 MILLIGRAM(S): at 05:23

## 2022-09-25 RX ADMIN — ENOXAPARIN SODIUM 40 MILLIGRAM(S): 100 INJECTION SUBCUTANEOUS at 22:19

## 2022-09-25 RX ADMIN — OXYCODONE HYDROCHLORIDE 5 MILLIGRAM(S): 5 TABLET ORAL at 09:53

## 2022-09-25 RX ADMIN — AMPICILLIN SODIUM AND SULBACTAM SODIUM 200 GRAM(S): 250; 125 INJECTION, POWDER, FOR SUSPENSION INTRAMUSCULAR; INTRAVENOUS at 23:39

## 2022-09-25 RX ADMIN — AMPICILLIN SODIUM AND SULBACTAM SODIUM 200 GRAM(S): 250; 125 INJECTION, POWDER, FOR SUSPENSION INTRAMUSCULAR; INTRAVENOUS at 14:15

## 2022-09-25 RX ADMIN — Medication 12.5 MILLIGRAM(S): at 12:30

## 2022-09-25 RX ADMIN — Medication 400 MILLIGRAM(S): at 13:20

## 2022-09-25 RX ADMIN — Medication 40 MILLIEQUIVALENT(S): at 06:23

## 2022-09-25 RX ADMIN — INSULIN HUMAN 10 UNIT(S)/HR: 100 INJECTION, SOLUTION SUBCUTANEOUS at 22:19

## 2022-09-25 RX ADMIN — Medication 60 MILLIGRAM(S): at 18:08

## 2022-09-25 RX ADMIN — Medication 650 MILLIGRAM(S): at 14:44

## 2022-09-25 RX ADMIN — Medication 2 TABLET(S): at 21:18

## 2022-09-25 RX ADMIN — Medication 2 TABLET(S): at 05:22

## 2022-09-25 RX ADMIN — Medication 12.5 MILLIGRAM(S): at 18:08

## 2022-09-25 RX ADMIN — CHLORHEXIDINE GLUCONATE 15 MILLILITER(S): 213 SOLUTION TOPICAL at 05:24

## 2022-09-25 RX ADMIN — Medication 100 MILLIGRAM(S): at 05:22

## 2022-09-25 RX ADMIN — Medication 400 MILLIGRAM(S): at 13:50

## 2022-09-25 RX ADMIN — INSULIN HUMAN 10 UNIT(S)/HR: 100 INJECTION, SOLUTION SUBCUTANEOUS at 14:15

## 2022-09-25 RX ADMIN — Medication 650 MILLIGRAM(S): at 21:18

## 2022-09-25 NOTE — PHYSICAL THERAPY INITIAL EVALUATION ADULT - PLANNED THERAPY INTERVENTIONS, PT EVAL
gait training/transfer training
balance training/bed mobility training/gait training/transfer training

## 2022-09-25 NOTE — PHYSICAL THERAPY INITIAL EVALUATION ADULT - PERTINENT HX OF CURRENT PROBLEM, REHAB EVAL
21 yo M with no significant PMHx presents to the ED c/o R sided lower dental pain x 1 week with development of fever/diffuse myalgias x 3 days. Pt had gone work on Monday in his usual state of health. When he came home that night he had begun complaining of aches and feeling unwell. Over the past few days, his dental pain has gotten worse. He also endorses having fever as high as 103. He reports the pain has made it difficult for him to walk; he also became nauseous when eating and he hasn't had anything but water for past two days. He denies any headaches, sob, chest pain, abd pain, diarrhea, constipation. Pt has no recent dental work, no trauma, no known sick contacts, no pets or animal contact. Pt's father reports he has not had any vaccinations.
PT re-evaluation, pt now s/p pericardial window. Procedure/Dx/Injuries: pericardial drain, cardiac tamponade s/p creation of pericardial window

## 2022-09-25 NOTE — PHYSICAL THERAPY INITIAL EVALUATION ADULT - GAIT TRAINING, PT EVAL
5 days: Amb 400 ft independently without AD.
Goal: pt will ambulate with least restrictive assistive device 50 feet with supervision by discharge to facilitate return to PLOF.

## 2022-09-25 NOTE — PROGRESS NOTE ADULT - SUBJECTIVE AND OBJECTIVE BOX
GENERAL SURGERY PROGRESS NOTE    Patient: DEMETRIA LAZCANO , 22y ([birth year])Male   MRN: [id number]  Location: [address]   Visit: 09-22-22 Inpatient  Date: 09-25-22 @ 02:37      Procedure/Dx/Injuries: pericardial drain, cardiac tamponade s/p creation of pericardial window    Events of past 24 hours: s/p surgical intervention    PAST MEDICAL & SURGICAL HISTORY:  No pertinent past medical history      No significant past surgical history        Vitals:   T(F): 95.2 (09-25-22 @ 02:00), Max: 100.8 (09-24-22 @ 13:00)  HR: 81 (09-25-22 @ 02:00)  BP: 99/54 (09-25-22 @ 01:00)  RR: 24 (09-25-22 @ 02:00)  SpO2: 100% (09-25-22 @ 02:00)  Mode: PS (Pressure Support)/ Spontaneous, FiO2: 40, PEEP: 5, PS: 5, MAP: 8, PIP: 17    Diet, Regular  Diet, Clear Liquid  Diet, NPO:   NPO for Procedure/Test     NPO Start Date: 24-Sep-2022,   NPO Start Time: 23:00  Diet, NPO:   NPO for Procedure/Test     NPO Start Date: 24-Sep-2022,   NPO Start Time: 14:02  Except Medications  Diet, NPO:   Except Medications      Fluids: sodium chloride 0.9%.: Solution, 1000 milliLiter(s) infuse at 200 mL/Hr      I & O's:    09-23-22 @ 07:01  -  09-24-22 @ 07:00  --------------------------------------------------------  IN:    sodium chloride 0.9%: 4600 mL    Sodium Chloride 0.9% Bolus: 500 mL  Total IN: 5100 mL    OUT:    Indwelling Catheter - Urethral (mL): 1525 mL  Total OUT: 1525 mL    Total NET: 3575 mL          PHYSICAL EXAM:  General Appearance: AAOX3, NAD  Heart: RRR  Lungs: CTAX2, L CT X SXN  Abdomen:  soft, non tender  MSK/Extremities:  mobile      MEDICATIONS  (STANDING):  ampicillin/sulbactam  IVPB 3 Gram(s) IV Intermittent every 6 hours  calcium carbonate    500 mG (Tums) Chewable 2 Tablet(s) Chew every 8 hours  calcium gluconate IVPB 2 Gram(s) IV Intermittent once  ceFAZolin   IVPB 1000 milliGRAM(s) IV Intermittent every 8 hours  chlorhexidine 0.12% Liquid 15 milliLiter(s) Oral Mucosa every 12 hours  dexMEDEtomidine Infusion 1 MICROgram(s)/kG/Hr (16.5 mL/Hr) IV Continuous <Continuous>  dextrose 50% Injectable 50 milliLiter(s) IV Push every 15 minutes  enoxaparin Injectable 40 milliGRAM(s) SubCutaneous every 24 hours  insulin regular Infusion 10 Unit(s)/Hr (10 mL/Hr) IV Continuous <Continuous>  meperidine     Injectable 25 milliGRAM(s) IV Push once  methylPREDNISolone sodium succinate Injectable 60 milliGRAM(s) IV Push every 12 hours  polyethylene glycol 3350 17 Gram(s) Oral daily  propofol Infusion 30 MICROgram(s)/kG/Min (11.8 mL/Hr) IV Continuous <Continuous>  senna 2 Tablet(s) Oral at bedtime  sodium bicarbonate 650 milliGRAM(s) Oral every 8 hours  sodium chloride 0.9%. 1000 milliLiter(s) (200 mL/Hr) IV Continuous <Continuous>    MEDICATIONS  (PRN):  acetaminophen     Tablet .. 650 milliGRAM(s) Oral every 6 hours PRN Temp greater or equal to 38C (100.4F), Mild Pain (1 - 3)  aluminum hydroxide/magnesium hydroxide/simethicone Suspension 30 milliLiter(s) Oral every 4 hours PRN Dyspepsia  HYDROmorphone  Injectable 0.5 milliGRAM(s) IV Push every 6 hours PRN Breakthrough Pain  melatonin 3 milliGRAM(s) Oral at bedtime PRN Insomnia  ondansetron Injectable 4 milliGRAM(s) IV Push every 8 hours PRN Nausea and/or Vomiting  oxyCODONE    IR 5 milliGRAM(s) Oral every 4 hours PRN Moderate Pain (4 - 6)  oxyCODONE    IR 10 milliGRAM(s) Oral every 4 hours PRN Severe Pain (7 - 10)      DVT PROPHYLAXIS: enoxaparin Injectable 40 milliGRAM(s) SubCutaneous every 24 hours    GI PROPHYLAXIS:   ANTICOAGULATION:   ANTIBIOTICS:  ampicillin/sulbactam  IVPB 3 Gram(s)  ceFAZolin   IVPB 1000 milliGRAM(s)            LAB/STUDIES:  Labs:  CAPILLARY BLOOD GLUCOSE      POCT Blood Glucose.: 141 mg/dL (25 Sep 2022 01:35)  POCT Blood Glucose.: 150 mg/dL (25 Sep 2022 00:13)  POCT Blood Glucose.: 153 mg/dL (24 Sep 2022 22:25)  POCT Blood Glucose.: 97 mg/dL (24 Sep 2022 22:21)  POCT Blood Glucose.: 171 mg/dL (24 Sep 2022 20:48)  POCT Blood Glucose.: 154 mg/dL (24 Sep 2022 18:29)                          11.8   5.35  )-----------( 141      ( 24 Sep 2022 19:54 )             34.5       Auto Neutrophil %: 73.5 % (09-24-22 @ 05:35)  Band Neutrophils %: 5.3 % (09-24-22 @ 05:35)    09-25    134<L>  |  104  |  15  ----------------------------<  154<H>  3.9   |  20  |  0.5<L>      Calcium, Total Serum: 7.0 mg/dL (09-25-22 @ 00:20)      LFTs:             4.9  | 0.4  | 549      ------------------[61      ( 24 Sep 2022 19:54 )  2.8  | x    | 266         Lipase:x      Amylase:x         Blood Gas Arterial, Lactate: 1.40 mmol/L (09-25-22 @ 01:22)  Blood Gas Arterial, Lactate: 1.70 mmol/L (09-24-22 @ 23:08)  Blood Gas Arterial, Lactate: 2.00 mmol/L (09-24-22 @ 21:25)  Lactate, Blood: 1.8 mmol/L (09-24-22 @ 21:20)  Blood Gas Arterial, Lactate: 2.20 mmol/L (09-24-22 @ 18:12)  Lactate, Blood: 3.3 mmol/L (09-24-22 @ 11:34)  Blood Gas Arterial, Lactate: 1.80 mmol/L (09-24-22 @ 08:16)  Lactate, Blood: 1.6 mmol/L (09-22-22 @ 11:38)    ABG - ( 25 Sep 2022 01:22 )  pH: 7.43  /  pCO2: 30    /  pO2: 134   / HCO3: 20    / Base Excess: -3.5  /  SaO2: 99.2            ABG - ( 24 Sep 2022 23:08 )  pH: 7.44  /  pCO2: 29    /  pO2: 185   / HCO3: 20    / Base Excess: -3.4  /  SaO2: 99.4            ABG - ( 24 Sep 2022 21:25 )  pH: 7.48  /  pCO2: 29    /  pO2: 161   / HCO3: 22    / Base Excess: -1.0  /  SaO2: 99.0              Coags:     13.00  ----< 1.13    ( 24 Sep 2022 19:54 )     27.8        CARDIAC MARKERS ( 24 Sep 2022 19:54 )  x     / 0.84 ng/mL / 25890 U/L / x     / x      CARDIAC MARKERS ( 24 Sep 2022 13:51 )  x     / 1.36 ng/mL / x     / x     / x      CARDIAC MARKERS ( 24 Sep 2022 05:35 )  x     / x     / 52960 U/L / x     / x      CARDIAC MARKERS ( 23 Sep 2022 17:59 )  x     / x     / 99419 U/L / x     / x      CARDIAC MARKERS ( 23 Sep 2022 05:57 )  x     / x     / 79607 U/L / x     / x                  Culture - Urine (collected 22 Sep 2022 03:15)  Source: Clean Catch Clean Catch (Midstream)  Final Report (23 Sep 2022 14:50):    No growth              IMAGING:      ACCESS/ DEVICES:  [x ] Peripheral IV  [ ] Central Venous Line	[ ] R	[ ] L	[ ] IJ	[ ] Fem	[ ] SC	Placed:   [ ] Arterial Line		[ ] R	[ ] L	[ ] Fem	[ ] Rad	[ ] Ax	Placed:   [ ] PICC:					[ ] Mediport  [ ] Urinary Catheter,  Date Placed:   [ ] Chest tube: [ ] Right, [ ] Left  [ ] ARMEN/Jason Drains      ASSESSMENT:

## 2022-09-25 NOTE — CONSULT NOTE ADULT - ASSESSMENT
Elevated CPK, myopericarditis, cardiac tamponade s/p pericardial window, submandibular inflammation/infection  -Uncertain etiology of patients clinical presentation. He has laboratory findings and symptoms of muscle pain concerning for possible rhabdomyolysis (elevated CPK). He has no muscle weakness on physical exam that is usually seen with inflammatory myositis. Unclear where his fevers are from either possible submandibular infection? He is on Unasyn for this. Fevers can also be seen in myositis and connective tissue disease as systemic manifestation, they have since subsided. Pericardial and pleural effusions can be seen in autoimmune disease like SLE, myositis, vasculitis, sarcoidosis, RA, Sjogrens, scleroderma, Still's disease, MCTD, Behcets, but has no other symptoms of these conditions (skin rashes, joint pains, sicca symptoms, mucositis, muscle weakness, etc.)  -His TTE showed cardiac tamponade with EF 20% s/p pericardial window 9/24; pending fluid studies   -His labs show improving CPK; continue to trend  -Check myomarker panel, kevin-1 antibody, SRP antibody, HMG-CoA reductase antibody to evaluate for myositis  -Check EMG lower extremities. Would hold off on MRI lower extremity as muscle strength is normal  -With proteinuria and myopericarditis r/o SLE and ANCA vasculitis  -Will follow up ordered labs; add CCP, SSA and SSB  -Trend CPK  -Can continue solumedrol at current dose 60mg q12 until serologies result. If he develops muscle weakness or recurrence of pericardial effusion can escalate to 1 g  -Ibuprofen 400 mg TID for pericarditis    Elevated CPK, myopericarditis, cardiac tamponade s/p pericardial window, submandibular inflammation/infection  -Uncertain etiology of patients clinical presentation. He has laboratory findings and symptoms of muscle pain concerning for possible rhabdomyolysis (elevated CPK). He has no muscle weakness on physical exam that is usually seen with inflammatory myositis. Unclear where his fevers are from either possible submandibular infection? He is on Unasyn for this. Fevers can also be seen in myositis and connective tissue disease as systemic manifestation, they have since subsided. Pericardial and pleural effusions can be seen in autoimmune disease like SLE, myositis, vasculitis, sarcoidosis, RA, Sjogrens, scleroderma, Still's disease, MCTD, Behcets, but has no other symptoms of these conditions (skin rashes, joint pains, sicca symptoms, mucositis, muscle weakness, etc.)  -His TTE showed cardiac tamponade with EF 20% s/p pericardial window 9/24; pending fluid studies   -His labs show improving CPK; continue to trend  -Check EMG lower extremities. Would hold off on MRI lower extremity as muscle strength is normal  -With proteinuria and myopericarditis r/o SLE and ANCA vasculitis  -Will follow up ordered labs; add CCP, SSA and SSB, lyme  -Trend CPK  -Can continue solumedrol at current dose 60mg q12 until serologies result. If he develops muscle weakness or recurrence of pericardial effusion can escalate to 1 g  -Ibuprofen 400 mg TID for pericarditis    Elevated CPK, myopericarditis, cardiac tamponade s/p pericardial window, submandibular inflammation/infection  -Uncertain etiology of patients clinical presentation. He has laboratory findings and symptoms of muscle pain concerning for possible rhabdomyolysis (elevated CPK). He has no muscle weakness on physical exam that is usually seen with inflammatory myositis. Unclear where his fevers are from either possible submandibular infection? He is on Unasyn for this. Fevers can also be seen in myositis and connective tissue disease as systemic manifestation, they have since subsided. Pericardial and pleural effusions can be seen in autoimmune disease like SLE, myositis, vasculitis, sarcoidosis, RA, Sjogrens, scleroderma, Still's disease, MCTD, Behcets, but has no other symptoms of these conditions (skin rashes, joint pains, sicca symptoms, mucositis, muscle weakness, etc.)  -His TTE showed cardiac tamponade with EF 20% s/p pericardial window 9/24; pending fluid studies   -His labs show improving CPK; continue to trend  -Check EMG lower extremities. Would hold off on MRI lower extremity as muscle strength is normal  -With proteinuria and myopericarditis r/o SLE and ANCA vasculitis  -Will follow up ordered labs; add CCP, SSA and SSB, lyme  -Agree with cardiac MRI, and possible biopsy   -Can continue solumedrol 60mg q12 until serologies result. If he develops muscle weakness or recurrence of pericardial effusion can escalate to 1 g  -Ibuprofen 400 mg TID for pericarditis

## 2022-09-25 NOTE — PHYSICAL THERAPY INITIAL EVALUATION ADULT - TRANSFER TRAINING, PT EVAL
5 days: All transfers: Independent without AD.
Goal: Pt will come sit to stand to sit independent by discharge to facilitate return to PLOF.

## 2022-09-25 NOTE — PHYSICAL THERAPY INITIAL EVALUATION ADULT - GAIT DISTANCE, PT EVAL
400 ft x 1. HR increased to 136 bpm during amb, returned to room for safety.  bpm at rest.
3 steps laterallytoward head of bed

## 2022-09-25 NOTE — CONSULT NOTE ADULT - SUBJECTIVE AND OBJECTIVE BOX
Mamie #[id number] used    Rheumatology consult    Patient is a 22y old  Male who presents with a chief complaint of fever, facial swelling (25 Sep 2022 07:51)      HPI:  21 yo M with no significant PMHx presents to the ED c/o R sided lower dental pain x 1 week with development of fever/diffuse myalgias x 3 days. Pt had gone work on Monday in his usual state of health. When he came home that night he had begun complaining of aches and feeling unwell. Over the past few days, his dental pain has gotten worse. He also endorses having fever as high as 103. He reports the pain has made it difficult for him to walk; he also became nauseous when eating and he hasn't had anything but water for past two days. He denies any headaches, sob, chest pain, abd pain, diarrhea, constipation. Pt has no recent dental work, no trauma, no known sick contacts, no pets or animal contact. Pt's father reports he has not had any vaccinations.     Pt went to urgent care last night where he tested negative for covid and was given tylenol. He was told to come to the ED. In the ED, he was given a bolus of fluids, tylenol, and zosyn. CT showed enlarged right submandibular and right anterior upper jugular lymph nodes and associated adjacent soft tissue changes without drainable fluid collection.  (22 Sep 2022 08:22)    He reports two days prior to admission he developed fevers, shortness of breath without cough, chest pain, sudden onset constant bilateral leg and foot pains described in his bones and also in the muscles without any alleviating or worsening factors (improved since admission, but still present), generalized weakness in his body making it difficult to walk. He did start walking today. He also experience head pain, right sided facial pain (both resolved now). 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 Overall he's been healthy prior to this. He went to urgent care where he tested negative for covid. He reports occasional epistaxis. Otherwise denies skin rash, photosensitivity, mucositis, hearing issues, dysphagia or throat pain, raynauds, history of blood clots or kidney disease, joint pain, joint stiffness, joint swelling, sicca symptoms, hair loss, paresthesias, abdominal pain, +diarrhea since hospitalization, no melena or hematochezia, or urinary symptoms.    He is POD#1 s/p pericardial window with 300cc serosanguinous pericardial fluid and 400 cc pleural fluid removed, pericardial biopsy, and fluid studies sent.    ROS:  Negative except for:    PAST MEDICAL & SURGICAL HISTORY:  No pertinent past medical history      No significant past surgical history          SOCIAL HISTORY:    FAMILY HISTORY:      MEDICATIONS  (STANDING):  ampicillin/sulbactam  IVPB 3 Gram(s) IV Intermittent every 6 hours  calcium carbonate    500 mG (Tums) Chewable 2 Tablet(s) Chew every 8 hours  calcium gluconate IVPB 2 Gram(s) IV Intermittent once  ceFAZolin   IVPB 1000 milliGRAM(s) IV Intermittent every 8 hours  dextrose 50% Injectable 50 milliLiter(s) IV Push every 15 minutes  enoxaparin Injectable 40 milliGRAM(s) SubCutaneous every 24 hours  ibuprofen  Tablet. 400 milliGRAM(s) Oral every 8 hours  insulin regular Infusion 10 Unit(s)/Hr (10 mL/Hr) IV Continuous <Continuous>  meperidine     Injectable 25 milliGRAM(s) IV Push once  methylPREDNISolone sodium succinate Injectable 60 milliGRAM(s) IV Push every 12 hours  metoprolol tartrate 12.5 milliGRAM(s) Oral every 12 hours  pantoprazole    Tablet 40 milliGRAM(s) Oral before breakfast  sodium bicarbonate 650 milliGRAM(s) Oral every 8 hours  sodium chloride 0.9%. 1000 milliLiter(s) (200 mL/Hr) IV Continuous <Continuous>    MEDICATIONS  (PRN):  acetaminophen     Tablet .. 650 milliGRAM(s) Oral every 6 hours PRN Temp greater or equal to 38C (100.4F), Mild Pain (1 - 3)  aluminum hydroxide/magnesium hydroxide/simethicone Suspension 30 milliLiter(s) Oral every 4 hours PRN Dyspepsia  HYDROmorphone  Injectable 0.5 milliGRAM(s) IV Push every 6 hours PRN Breakthrough Pain  melatonin 3 milliGRAM(s) Oral at bedtime PRN Insomnia  ondansetron Injectable 4 milliGRAM(s) IV Push every 8 hours PRN Nausea and/or Vomiting  oxyCODONE    IR 5 milliGRAM(s) Oral every 4 hours PRN Moderate Pain (4 - 6)  oxyCODONE    IR 10 milliGRAM(s) Oral every 4 hours PRN Severe Pain (7 - 10)      Allergies    No Known Allergies    Intolerances        Vital Signs Last 24 Hrs  T(C): 35.8 (25 Sep 2022 08:00), Max: 38.2 (24 Sep 2022 13:00)  T(F): 96.5 (25 Sep 2022 08:00), Max: 100.8 (24 Sep 2022 13:00)  HR: 118 (25 Sep 2022 09:00) (79 - 138)  BP: 111/69 (25 Sep 2022 09:00) (87/54 - 128/55)  BP(mean): 86 (25 Sep 2022 09:00) (64 - 98)  RR: 32 (25 Sep 2022 09:00) (17 - 39)  SpO2: 95% (25 Sep 2022 09:00) (95% - 100%)    Parameters below as of 25 Sep 2022 09:00  Patient On (Oxygen Delivery Method): nasal cannula  O2 Flow (L/min): 3      PHYSICAL EXAM  General: adult in NAD  HEENT: clear oropharynx, anicteric sclera, pink conjunctiva  Neck: supple  CV: normal S1/S2 with no murmur rubs or gallops  Lungs: positive air movement b/l ant lungs,clear to auscultation, no wheezes, no rales  Abdomen: soft non-tender non-distended, no hepatosplenomegaly  Ext: no clubbing cyanosis or edema  Musculoskeletal: No joint swelling or joint tenderness in hands or feet. Full range of motion in legs. No tenderness to palpation bilateral lower extremities  Skin: no rashes and no petechiae, no shawl signs, gottron papules, heliotrope rash, periugual erytheam  Neuro: alert and oriented X 4, no focal deficits. 5/5 muscle strength in bilateral proximal lower extremities hip flexion, distally knee flexion and extension, toe extension      09-24-22 @ 07:01  -  09-25-22 @ 07:00  --------------------------------------------------------  IN: 4964.8 mL / OUT: 3650 mL / NET: 1314.8 mL    09-25-22 @ 07:01  -  09-25-22 @ 12:26  --------------------------------------------------------  IN: 704 mL / OUT: 555 mL / NET: 149 mL      LABS:                          11.9   7.60  )-----------( 153      ( 25 Sep 2022 05:40 )             33.5         Mean Cell Volume : 78.5 fL  Mean Cell Hemoglobin : 27.9 pg  Mean Cell Hemoglobin Concentration : 35.5 g/dL  Auto Neutrophil # : 6.10 K/uL  Auto Lymphocyte # : 1.24 K/uL  Auto Monocyte # : 0.21 K/uL  Auto Eosinophil # : 0.00 K/uL  Auto Basophil # : 0.01 K/uL  Auto Neutrophil % : 80.3 %  Auto Lymphocyte % : 16.3 %  Auto Monocyte % : 2.8 %  Auto Eosinophil % : 0.0 %  Auto Basophil % : 0.1 %      09-25    136  |  107  |  15  ----------------------------<  130<H>  3.7   |  18  |  0.5<L>    Ca    7.4<L>      25 Sep 2022 05:40  Phos  2.7     09-25  Mg     2.5     09-25    TPro  4.8<L>  /  Alb  2.8<L>  /  TBili  0.3  /  DBili  x   /  AST  391<H>  /  ALT  238<H>  /  AlkPhos  58  09-25      PT/INR - ( 25 Sep 2022 02:10 )   PT: 13.40 sec;   INR: 1.17 ratio         PTT - ( 25 Sep 2022 02:10 )  PTT:28.6 sec    Ferritin, Serum: 1084 ng/mL (09-24 @ 11:34)  Reticulocyte Percent: 0.9 % (09-23 @ 17:59)  Iron - Total Binding Capacity.: 204 ug/dL (09-23 @ 17:59)  Ferritin, Serum: 758 ng/mL (09-23 @ 17:59)              BLOOD SMEAR INTERPRETATION:       RADIOLOGY & ADDITIONAL STUDIES:

## 2022-09-25 NOTE — PROGRESS NOTE ADULT - SUBJECTIVE AND OBJECTIVE BOX
s/p pericardial window and pleural drainage.    Appears more comfortable.  Fever resolved.  Less tachycardic.  Maintaining BP.  Good UO.  CPK / LFT's troponin decreasing.    IMPRESSION:  1) Perimyocarditis:  - Possible related to underlying systemic inflammatory condition / facial infection.  - Severe LV dysfunction (relatively compensated).    2) Pericardial Effusion:  - Early tamponade s/p pericardial window.    RECOMMEND:  - Close hemodynamic monitoring.  - Maintain drain.  - Stop Ibuprofen given myocarditis / LV dysfunction.  - Start low-dose beta-blocker.  - Follow-up pericardial studies.  - Repeat ECHO  - Heart Failure consultation.

## 2022-09-25 NOTE — PHYSICAL THERAPY INITIAL EVALUATION ADULT - TINETTI GAIT TEST, REHAB EVAL
As per Tinetti test, pt is a low risk for falls.
Based on today's Tinetti number score, pt is a high risk to fall

## 2022-09-25 NOTE — PROGRESS NOTE ADULT - SUBJECTIVE AND OBJECTIVE BOX
Nephrology progress note    THIS IS AN INCOMPLETE NOTE . FULL NOTE TO FOLLOW SHORTLY    Patient is seen and examined, events over the last 24 h noted .    Allergies:  No Known Allergies    Hospital Medications:   MEDICATIONS  (STANDING):  ampicillin/sulbactam  IVPB 3 Gram(s) IV Intermittent every 6 hours  calcium carbonate    500 mG (Tums) Chewable 2 Tablet(s) Chew every 8 hours  calcium gluconate IVPB 2 Gram(s) IV Intermittent once  ceFAZolin   IVPB 1000 milliGRAM(s) IV Intermittent every 8 hours  chlorhexidine 0.12% Liquid 15 milliLiter(s) Oral Mucosa every 12 hours  dexMEDEtomidine Infusion 1 MICROgram(s)/kG/Hr (16.5 mL/Hr) IV Continuous <Continuous>  dextrose 50% Injectable 50 milliLiter(s) IV Push every 15 minutes  enoxaparin Injectable 40 milliGRAM(s) SubCutaneous every 24 hours  insulin regular Infusion 10 Unit(s)/Hr (10 mL/Hr) IV Continuous <Continuous>  meperidine     Injectable 25 milliGRAM(s) IV Push once  methylPREDNISolone sodium succinate Injectable 60 milliGRAM(s) IV Push every 12 hours  pantoprazole    Tablet 40 milliGRAM(s) Oral before breakfast  polyethylene glycol 3350 17 Gram(s) Oral daily  propofol Infusion 30 MICROgram(s)/kG/Min (11.8 mL/Hr) IV Continuous <Continuous>  senna 2 Tablet(s) Oral at bedtime  sodium bicarbonate 650 milliGRAM(s) Oral every 8 hours  sodium chloride 0.9%. 1000 milliLiter(s) (200 mL/Hr) IV Continuous <Continuous>        VITALS:  T(F): 96.7 (22 @ 04:00), Max: 100.8 (22 @ 13:00)  HR: 100 (22 @ 07:00)  BP: 89/56 (22 @ 06:00)  RR: 28 (22 @ 07:00)  SpO2: 99% (22 @ 07:00)  Wt(kg): --     @ 07:01  -   @ 07:00  --------------------------------------------------------  IN: 5100 mL / OUT: 1525 mL / NET: 3575 mL     @ 07:01  -   @ 07:00  --------------------------------------------------------  IN: 4964.8 mL / OUT: 3650 mL / NET: 1314.8 mL        Weight (kg): 65.8 ( @ 16:10)    PHYSICAL EXAM:  Constitutional: NAD  HEENT: anicteric sclera, oropharynx clear, MMM  Neck: No JVD  Respiratory: CTAB, no wheezes, rales or rhonchi  Cardiovascular: S1, S2, RRR  Gastrointestinal: BS+, soft, NT/ND  Extremities: No cyanosis or clubbing. No peripheral edema  :  No fernando.   Skin: No rashes    LABS:      136  |  107  |  15  ----------------------------<  130<H>  3.7   |  18  |  0.5<L>    Ca    7.4<L>      25 Sep 2022 05:40  Phos  2.7       Mg     2.5         TPro  4.8<L>  /  Alb  2.8<L>  /  TBili  0.3  /  DBili      /  AST  391<H>  /  ALT  238<H>  /  AlkPhos  58        Creatine Kinase, Serum: 41682 U/L (22 @ 05:40)                         11.9   7.60  )-----------( 153      ( 25 Sep 2022 05:40 )             33.5       Urine Studies:  Urinalysis Basic - ( 22 Sep 2022 03:15 )    Color: Yellow / Appearance: Clear / S.039 / pH:   Gluc:  / Ketone: Moderate  / Bili: Negative / Urobili: 3 mg/dL   Blood:  / Protein: 30 mg/dL / Nitrite: Negative   Leuk Esterase: Negative / RBC: 1 /HPF / WBC 1 /HPF   Sq Epi:  / Non Sq Epi: 2 /HPF / Bacteria: Negative      Sodium, Random Urine: 65.0 mmoL/L ( 19:30)  Creatinine, Random Urine: 104 mg/dL ( 19:30)  Protein/Creatinine Ratio Calculation: 0.8 Ratio (:)  Osmolality, Random Urine: 889 mos/kg ( 19:30)  Potassium, Random Urine: 50 mmol/L ( @ 19:30)      Iron 17, TIBC 204, %sat 8      [22 @ 17:59]  Ferritin 1084      [22 @ 11:34]  TSH 1.11      [22 @ 11:34]    HBsAg Nonreact      [22 @ 05:57]  HCV 0.11, Nonreact      [22 @ 05:57]  HIV Nonreact      [22 @ 11:49]        RADIOLOGY & ADDITIONAL STUDIES:   Nephrology progress note  Patient is seen and examined, events over the last 24 h noted .  sp pericardial window     Allergies:  No Known Allergies    Hospital Medications:   MEDICATIONS  (STANDING):    ampicillin/sulbactam  IVPB 3 Gram(s) IV Intermittent every 6 hours  calcium carbonate    500 mG (Tums) Chewable 2 Tablet(s) Chew every 8 hours  calcium gluconate IVPB 2 Gram(s) IV Intermittent once  ceFAZolin   IVPB 1000 milliGRAM(s) IV Intermittent every 8 hours  dexMEDEtomidine Infusion 1 MICROgram(s)/kG/Hr (16.5 mL/Hr) IV Continuous <Continuous>  dextrose 50% Injectable 50 milliLiter(s) IV Push every 15 minutes  enoxaparin Injectable 40 milliGRAM(s) SubCutaneous every 24 hours  insulin regular Infusion 10 Unit(s)/Hr (10 mL/Hr) IV Continuous <Continuous>  meperidine     Injectable 25 milliGRAM(s) IV Push once  methylPREDNISolone sodium succinate Injectable 60 milliGRAM(s) IV Push every 12 hours  pantoprazole    Tablet 40 milliGRAM(s) Oral before breakfast  polyethylene glycol 3350 17 Gram(s) Oral daily  propofol Infusion 30 MICROgram(s)/kG/Min (11.8 mL/Hr) IV Continuous <Continuous>  senna 2 Tablet(s) Oral at bedtime  sodium bicarbonate 650 milliGRAM(s) Oral every 8 hours  sodium chloride 0.9%. 1000 milliLiter(s) (200 mL/Hr) IV Continuous <Continuous>        VITALS:  T(F): 96.7 (22 @ 04:00), Max: 100.8 (22 @ 13:00)  HR: 100 (22 @ 07:00)  BP: 89/56 (22 @ 06:00)  RR: 28 (22 @ 07:00)  SpO2: 99% (22 @ 07:00)       @ 07:01  -   @ 07:00  --------------------------------------------------------  IN: 5100 mL / OUT: 1525 mL / NET: 3575 mL     @ 07:01  -  09-25 @ 07:00  --------------------------------------------------------  IN: 4964.8 mL / OUT: 3650 mL / NET: 1314.8 mL        Weight (kg): 65.8 ( @ 16:10)    PHYSICAL EXAM:  Constitutional: NAD  Respiratory: CTAB,   Cardiovascular: S1, S2, RRR  Gastrointestinal: BS+, soft, NT/ND  Extremities: No cyanosis or clubbing. No peripheral edema  :  No fernando.   Skin: No rashes    LABS:      136  |  107  |  15  ----------------------------<  130<H>  3.7   |  18  |  0.5<L>    Ca    7.4<L>      25 Sep 2022 05:40  Phos  2.7       Mg     2.5         TPro  4.8<L>  /  Alb  2.8<L>  /  TBili  0.3  /  DBili      /  AST  391<H>  /  ALT  238<H>  /  AlkPhos  58        Creatine Kinase, Serum: 37282 U/L (22 @ 05:40)                         11.9   7.60  )-----------( 153      ( 25 Sep 2022 05:40 )             33.5       Urine Studies:  Urinalysis Basic - ( 22 Sep 2022 03:15 )    Color: Yellow / Appearance: Clear / S.039 / pH:   Gluc:  / Ketone: Moderate  / Bili: Negative / Urobili: 3 mg/dL   Blood:  / Protein: 30 mg/dL / Nitrite: Negative   Leuk Esterase: Negative / RBC: 1 /HPF / WBC 1 /HPF   Sq Epi:  / Non Sq Epi: 2 /HPF / Bacteria: Negative      Sodium, Random Urine: 65.0 mmoL/L ( @ 19:30)  Creatinine, Random Urine: 104 mg/dL ( @ 19:30)  Protein/Creatinine Ratio Calculation: 0.8 Ratio ( @ 19:30)  Osmolality, Random Urine: 889 mos/kg ( @ 19:30)  Potassium, Random Urine: 50 mmol/L ( @ 19:30)      Iron 17, TIBC 204, %sat 8      [22 @ 17:59]  Ferritin 1084      [22 @ 11:34]  TSH 1.11      [22 @ 11:34]    HBsAg Nonreact      [22 @ 05:57]  HCV 0.11, Nonreact      [22 @ 05:57]  HIV Nonreact      [22 @ 11:49]        RADIOLOGY & ADDITIONAL STUDIES:

## 2022-09-25 NOTE — PROGRESS NOTE ADULT - ASSESSMENT
23 yo M with no significant PMHx presents to the ED c/o R sided lower dental pain x 1 week with development of fever/diffuse myalgias x 3 days. CT surgery consulted for Cardiac Tamponade with Pericardial effusion, Myopericarditis reduced EF; currently s/p pericardial window. Pt tolerated procedure well, taken to the CTU w a L CT x SXN.    PLAN:  -management in the CTU  -monitor drain output  -pain control  -keep CT x SXN for now  -qdaily xray  -ABX as per ID  -monitor for fevers  -monitor FS, currently on insulin drip  -Rheum, ID and Nephro followings    spectra [id number]

## 2022-09-25 NOTE — PHYSICAL THERAPY INITIAL EVALUATION ADULT - GAIT DEVIATIONS NOTED, PT EVAL
decreased khoi/decreased step length/decreased stride length
Normal khoi, +cardiac RW with A line and chest tube, +NC 3 L/m during amb.

## 2022-09-25 NOTE — PHYSICAL THERAPY INITIAL EVALUATION ADULT - GENERAL OBSERVATIONS, REHAB EVAL
1110 - 1140 PT Re-evaluation note. Pt rec in bed in chair mode with +A line, +chest tube on suction (water sealed for amb), +NC 3 L/m, in NAD with DWAYNE Hook and pt's brothers in room assisting with [place]ic interpretation as needed.
pt. encountered in bed in NAD  +IV lock, brothers at bedside. Per RN, pt has been ambulating to bathroom with assistance. Per PCA, oral temp 97.3.  Venkat #[id number] provided translation

## 2022-09-25 NOTE — PROGRESS NOTE ADULT - ASSESSMENT
21 yo M with no significant PMHx presents to the ED c/o R sided lower dental pain x 1 week with development of fever/diffuse myalgias x 3 days. Pt had gone work on Monday in his usual state of health. When he came home that night he had begun complaining of aches and feeling unwell. He was found to have right jaw soft tissue infection, in severe sepsis and was started on abx.  # rhabdomyolysis / soft tissue infection / pericardial tamponade sp pericardial window  continue IVF NS at 200 cc per hour  UO noted   maintain - 250 cc/h   on bicarbonate po / continue follow Ca levels / ionized Ca   ph noted unusual in the setting of rhabdo , usually high , ? underlying hypophosphatemia/ hypokalemia follow repeat  check c3 C4 CAROLE DsDNA   check lytes q 8 h   follow CK level   will follow

## 2022-09-26 LAB
A1C WITH ESTIMATED AVERAGE GLUCOSE RESULT: 6.5 % — HIGH (ref 4–5.6)
ANA TITR SER: NEGATIVE — SIGNIFICANT CHANGE UP
ANION GAP SERPL CALC-SCNC: 9 MMOL/L — SIGNIFICANT CHANGE UP (ref 7–14)
BASOPHILS # BLD AUTO: 0.01 K/UL — SIGNIFICANT CHANGE UP (ref 0–0.2)
BASOPHILS NFR BLD AUTO: 0.1 % — SIGNIFICANT CHANGE UP (ref 0–1)
BUN SERPL-MCNC: 14 MG/DL — SIGNIFICANT CHANGE UP (ref 10–20)
CALCIUM SERPL-MCNC: 7.3 MG/DL — LOW (ref 8.4–10.4)
CHLORIDE SERPL-SCNC: 106 MMOL/L — SIGNIFICANT CHANGE UP (ref 98–110)
CK SERPL-CCNC: HIGH U/L (ref 0–225)
CO2 SERPL-SCNC: 23 MMOL/L — SIGNIFICANT CHANGE UP (ref 17–32)
CREAT SERPL-MCNC: 0.5 MG/DL — LOW (ref 0.7–1.5)
EGFR: 149 ML/MIN/1.73M2 — SIGNIFICANT CHANGE UP
EOSINOPHIL # BLD AUTO: 0 K/UL — SIGNIFICANT CHANGE UP (ref 0–0.7)
EOSINOPHIL NFR BLD AUTO: 0 % — SIGNIFICANT CHANGE UP (ref 0–8)
ESTIMATED AVERAGE GLUCOSE: 140 MG/DL — HIGH (ref 68–114)
GAMMA INTERFERON BACKGROUND BLD IA-ACNC: 6.05 IU/ML — SIGNIFICANT CHANGE UP
GLUCOSE SERPL-MCNC: 131 MG/DL — HIGH (ref 70–99)
HCT VFR BLD CALC: 31.2 % — LOW (ref 42–52)
HGB BLD-MCNC: 11 G/DL — LOW (ref 14–18)
IMM GRANULOCYTES NFR BLD AUTO: 0.7 % — HIGH (ref 0.1–0.3)
LYMPHOCYTES # BLD AUTO: 0.91 K/UL — LOW (ref 1.2–3.4)
LYMPHOCYTES # BLD AUTO: 11.1 % — LOW (ref 20.5–51.1)
M TB IFN-G BLD-IMP: NEGATIVE — SIGNIFICANT CHANGE UP
M TB IFN-G CD4+ BCKGRND COR BLD-ACNC: 0.07 IU/ML — SIGNIFICANT CHANGE UP
M TB IFN-G CD4+CD8+ BCKGRND COR BLD-ACNC: 0.22 IU/ML — SIGNIFICANT CHANGE UP
MCHC RBC-ENTMCNC: 27.4 PG — SIGNIFICANT CHANGE UP (ref 27–31)
MCHC RBC-ENTMCNC: 35.3 G/DL — SIGNIFICANT CHANGE UP (ref 32–37)
MCV RBC AUTO: 77.8 FL — LOW (ref 80–94)
MONOCYTES # BLD AUTO: 0.58 K/UL — SIGNIFICANT CHANGE UP (ref 0.1–0.6)
MONOCYTES NFR BLD AUTO: 7.1 % — SIGNIFICANT CHANGE UP (ref 1.7–9.3)
NEUTROPHILS # BLD AUTO: 6.66 K/UL — HIGH (ref 1.4–6.5)
NEUTROPHILS NFR BLD AUTO: 81 % — HIGH (ref 42.2–75.2)
NRBC # BLD: 0 /100 WBCS — SIGNIFICANT CHANGE UP (ref 0–0)
PLATELET # BLD AUTO: 203 K/UL — SIGNIFICANT CHANGE UP (ref 130–400)
POTASSIUM SERPL-MCNC: 3.1 MMOL/L — LOW (ref 3.5–5)
POTASSIUM SERPL-SCNC: 3.1 MMOL/L — LOW (ref 3.5–5)
QUANT TB PLUS MITOGEN MINUS NIL: 2.99 IU/ML — SIGNIFICANT CHANGE UP
RBC # BLD: 4.01 M/UL — LOW (ref 4.7–6.1)
RBC # FLD: 13.2 % — SIGNIFICANT CHANGE UP (ref 11.5–14.5)
RHEUMATOID FACT SERPL-ACNC: <10 IU/ML — SIGNIFICANT CHANGE UP (ref 0–13)
SODIUM SERPL-SCNC: 138 MMOL/L — SIGNIFICANT CHANGE UP (ref 135–146)
WBC # BLD: 8.22 K/UL — SIGNIFICANT CHANGE UP (ref 4.8–10.8)
WBC # FLD AUTO: 8.22 K/UL — SIGNIFICANT CHANGE UP (ref 4.8–10.8)

## 2022-09-26 PROCEDURE — 93306 TTE W/DOPPLER COMPLETE: CPT | Mod: 26

## 2022-09-26 PROCEDURE — 71045 X-RAY EXAM CHEST 1 VIEW: CPT | Mod: 26

## 2022-09-26 PROCEDURE — 93010 ELECTROCARDIOGRAM REPORT: CPT

## 2022-09-26 RX ORDER — POTASSIUM CHLORIDE 20 MEQ
20 PACKET (EA) ORAL
Refills: 0 | Status: DISCONTINUED | OUTPATIENT
Start: 2022-09-26 | End: 2022-09-28

## 2022-09-26 RX ORDER — LOPERAMIDE HCL 2 MG
2 TABLET ORAL ONCE
Refills: 0 | Status: COMPLETED | OUTPATIENT
Start: 2022-09-26 | End: 2022-09-26

## 2022-09-26 RX ORDER — POTASSIUM CHLORIDE 20 MEQ
40 PACKET (EA) ORAL ONCE
Refills: 0 | Status: COMPLETED | OUTPATIENT
Start: 2022-09-26 | End: 2022-09-26

## 2022-09-26 RX ORDER — SODIUM CHLORIDE 9 MG/ML
1000 INJECTION INTRAMUSCULAR; INTRAVENOUS; SUBCUTANEOUS
Refills: 0 | Status: DISCONTINUED | OUTPATIENT
Start: 2022-09-26 | End: 2022-09-28

## 2022-09-26 RX ADMIN — Medication 400 MILLIGRAM(S): at 14:00

## 2022-09-26 RX ADMIN — Medication 60 MILLIGRAM(S): at 17:29

## 2022-09-26 RX ADMIN — Medication 12.5 MILLIGRAM(S): at 17:27

## 2022-09-26 RX ADMIN — AMPICILLIN SODIUM AND SULBACTAM SODIUM 200 GRAM(S): 250; 125 INJECTION, POWDER, FOR SUSPENSION INTRAMUSCULAR; INTRAVENOUS at 06:12

## 2022-09-26 RX ADMIN — AMPICILLIN SODIUM AND SULBACTAM SODIUM 200 GRAM(S): 250; 125 INJECTION, POWDER, FOR SUSPENSION INTRAMUSCULAR; INTRAVENOUS at 17:34

## 2022-09-26 RX ADMIN — Medication 650 MILLIGRAM(S): at 17:27

## 2022-09-26 RX ADMIN — AMPICILLIN SODIUM AND SULBACTAM SODIUM 200 GRAM(S): 250; 125 INJECTION, POWDER, FOR SUSPENSION INTRAMUSCULAR; INTRAVENOUS at 11:01

## 2022-09-26 RX ADMIN — SODIUM CHLORIDE 150 MILLILITER(S): 9 INJECTION INTRAMUSCULAR; INTRAVENOUS; SUBCUTANEOUS at 21:27

## 2022-09-26 RX ADMIN — Medication 400 MILLIGRAM(S): at 22:45

## 2022-09-26 RX ADMIN — Medication 40 MILLIEQUIVALENT(S): at 06:11

## 2022-09-26 RX ADMIN — Medication 400 MILLIGRAM(S): at 22:14

## 2022-09-26 RX ADMIN — Medication 2 TABLET(S): at 17:26

## 2022-09-26 RX ADMIN — PANTOPRAZOLE SODIUM 40 MILLIGRAM(S): 20 TABLET, DELAYED RELEASE ORAL at 06:13

## 2022-09-26 RX ADMIN — Medication 400 MILLIGRAM(S): at 06:11

## 2022-09-26 RX ADMIN — Medication 20 MILLIEQUIVALENT(S): at 17:27

## 2022-09-26 RX ADMIN — ENOXAPARIN SODIUM 40 MILLIGRAM(S): 100 INJECTION SUBCUTANEOUS at 22:15

## 2022-09-26 RX ADMIN — Medication 2 TABLET(S): at 06:11

## 2022-09-26 RX ADMIN — Medication 2 MILLIGRAM(S): at 17:26

## 2022-09-26 RX ADMIN — Medication 650 MILLIGRAM(S): at 06:12

## 2022-09-26 RX ADMIN — Medication 2 TABLET(S): at 22:15

## 2022-09-26 RX ADMIN — Medication 12.5 MILLIGRAM(S): at 06:11

## 2022-09-26 RX ADMIN — Medication 400 MILLIGRAM(S): at 06:26

## 2022-09-26 RX ADMIN — Medication 60 MILLIGRAM(S): at 06:12

## 2022-09-26 RX ADMIN — Medication 400 MILLIGRAM(S): at 13:35

## 2022-09-26 NOTE — PROGRESS NOTE ADULT - ASSESSMENT
21yo M with moderate-to-large pericardial effusion and echocardiographic signs of tamponade. Reported EF 30%. After assessing the patient and the TTE, I recommended urgent drainage of the pericardial effusion. Risks and benefits were explained to the patient and his family who agreed to proceed.    The patient was brought to the OR and positioned supine on the OR table. A right radial A-line was placed and the patient was induced and intubated. A ANNE was performed that confirmed the diagnosis. He was prepped and draped in the usual sterile fashion. A 4 cm incision was made in the 5th left intercostal space. The soft tissues were dissected and the left chest entered. 450cc of serous fluid was drained from the left pleural space. The pericardium was identified and opened. 300cc of clear pericardial fluid were drained. The fluid was sent to pathology and microbiology for further analysis. A window of 5x5 cm was created between the pericardium and the left pleural space and the tissue was also sent for further assessment.  A 20Fr. sha was then placed in the pericardium and left pleural space and secured to the skin. Hemostasis was achieved. ANNE showed complete resolution of the pericardial effusion. Hemodynamics also slightly improved. The subcutaneous tissue and the dermal layer were sutured in the usual fashion. The patient tolerated the procedure well and was transfered to the CTICU for further management.

## 2022-09-26 NOTE — PROGRESS NOTE ADULT - ASSESSMENT
ASSESSMENT:  21 yo M with no significant PMHx presents to the ED c/o R sided lower dental pain x 1 week with development of fever/diffuse myalgias x 3 days. CT surgery consulted for Cardiac Tamponade with Pericardial effusion, Myopericarditis reduced EF; currently s/p pericardial window. Pt tolerated procedure well, taken to the CTU w a L CT x SXN.    PLAN:  - Keep pericardial drain for additional day, keep to suction  - Monitor drain output  - Nephro: , goal -250/hour, oral bicarb, monitor calcium, trend CK, f/u lab markers  - Rheum: F/u lab markers, agree with Cardiac MRI w/ possible biopsy, solumedrol 60mg q12  - F/u cardiology recs  - Daily CXR  - Pain control  - ABX as per ID (unasyn)  - Monitor for fevers  - Monitor FS, currently on insulin drip  - Rheum, ID and Nephro following  - Management per CTU    [facility]  SPECTRA [id number]

## 2022-09-26 NOTE — PROGRESS NOTE ADULT - ASSESSMENT
23 yo M with no significant PMHx presents to the ED c/o R sided lower dental pain x 1 week with development of fever/diffuse myalgias x 3 days. Pt had gone work on Monday in his usual state of health. When he came home that night he had begun complaining of aches and feeling unwell. He was found to have right jaw soft tissue infection, in severe sepsis and was started on abx.  # rhabdomyolysis / soft tissue infection / pericardial tamponade sp pericardial window  off IVF  Creatine Kinase, Serum: 61901 noted trending down   UO noted   maintain - 250 cc/h   keep on   on bicarbonate po / continue follow Ca levels / ionized Ca   ph noted unusual in the setting of rhabdo , usually high , ? underlying hypophosphatemia/ hypokalemia follow repeat  check c3 C4 CAROLE DsDNA ANCA - Has minimal proteinuria not suggestive of NS / doubt lupus nephritis   check lytes q 8 h   follow CK level q12h   will follow

## 2022-09-26 NOTE — PROGRESS NOTE ADULT - SUBJECTIVE AND OBJECTIVE BOX
CARDIOTHORACIC SURGERY PROGRESS NOTE    Hospital Day: 5  Post operative day: 2  Procedure: Pericardial window with drain placement      24 Hour Events:  No acute events.   Patient seen resting comfortably in the chair, no complaints.   Pericardial drain to suction, no air leak observed.     Vitals:  T(F): 97.2 (09-26-22 @ 04:00), Max: 98 (09-25-22 @ 14:00)  HR: 101 (09-26-22 @ 08:00)  BP: 114/67 (09-25-22 @ 11:00)  RR: 22 (09-26-22 @ 08:00)  SpO2: 98% (09-26-22 @ 08:00)    Diet, Regular:   Consistent Carbohydrate No Snacks      Fluids:     I & O's:    09-25-22 @ 07:01  -  09-26-22 @ 07:00  --------------------------------------------------------  IN:    Insulin: 86 mL    IV PiggyBack: 300 mL    Oral Fluid: 2240 mL    sodium chloride 0.9%: 4800 mL  Total IN: 7426 mL    OUT:    Chest Tube (mL): 125 mL    Indwelling Catheter - Urethral (mL): 4630 mL  Total OUT: 4755 mL    Total NET: 2671 mL    PHYSICAL EXAM:  General: NAD, diaphoresis  Cardiac: regular rhythm, slight tachycardia, S1/S2 identified, nmrg  Respiratory: unlabored breathing at rest, clear to auscultation bilaterally  Abdomen: Soft, non-distended, non-tender, no rebound, no guarding.  Musculoskeletal: FROM in b/l UE and LE  Neuro: Sensation grossly intact and equal throughout, no focal deficits  Skin: Warm/dry, normal color, no jaundice  Incision/wound: Pericardial drain in place, clean/dry/intact    MEDICATIONS  (STANDING):  ampicillin/sulbactam  IVPB 3 Gram(s) IV Intermittent every 6 hours  calcium carbonate    500 mG (Tums) Chewable 2 Tablet(s) Chew every 8 hours  calcium gluconate IVPB 2 Gram(s) IV Intermittent once  dextrose 50% Injectable 50 milliLiter(s) IV Push every 15 minutes  enoxaparin Injectable 40 milliGRAM(s) SubCutaneous every 24 hours  ibuprofen  Tablet. 400 milliGRAM(s) Oral every 8 hours  insulin regular Infusion 10 Unit(s)/Hr (10 mL/Hr) IV Continuous <Continuous>  meperidine     Injectable 25 milliGRAM(s) IV Push once  methylPREDNISolone sodium succinate Injectable 60 milliGRAM(s) IV Push every 12 hours  metoprolol tartrate 12.5 milliGRAM(s) Oral every 12 hours  pantoprazole    Tablet 40 milliGRAM(s) Oral before breakfast  potassium chloride    Tablet ER 20 milliEquivalent(s) Oral two times a day  sodium bicarbonate 650 milliGRAM(s) Oral every 8 hours  sodium chloride 0.9%. 1000 milliLiter(s) (200 mL/Hr) IV Continuous <Continuous>    MEDICATIONS  (PRN):  acetaminophen     Tablet .. 650 milliGRAM(s) Oral every 6 hours PRN Temp greater or equal to 38C (100.4F), Mild Pain (1 - 3)  aluminum hydroxide/magnesium hydroxide/simethicone Suspension 30 milliLiter(s) Oral every 4 hours PRN Dyspepsia  HYDROmorphone  Injectable 0.5 milliGRAM(s) IV Push every 6 hours PRN Breakthrough Pain  melatonin 3 milliGRAM(s) Oral at bedtime PRN Insomnia  ondansetron Injectable 4 milliGRAM(s) IV Push every 8 hours PRN Nausea and/or Vomiting  oxyCODONE    IR 5 milliGRAM(s) Oral every 4 hours PRN Moderate Pain (4 - 6)  oxyCODONE    IR 10 milliGRAM(s) Oral every 4 hours PRN Severe Pain (7 - 10)    DVT PROPHYLAXIS: enoxaparin Injectable 40 milliGRAM(s) SubCutaneous every 24 hours    GI PROPHYLAXIS: pantoprazole    Tablet 40 milliGRAM(s) Oral before breakfast    ANTICOAGULATION:   ANTIBIOTICS:  ampicillin/sulbactam  IVPB 3 Gram(s)    LAB/STUDIES:  Labs:  CAPILLARY BLOOD GLUCOSE    POCT Blood Glucose.: 129 mg/dL (26 Sep 2022 06:48)  POCT Blood Glucose.: 137 mg/dL (26 Sep 2022 02:34)  POCT Blood Glucose.: 136 mg/dL (26 Sep 2022 01:25)  POCT Blood Glucose.: 128 mg/dL (25 Sep 2022 23:22)  POCT Blood Glucose.: 157 mg/dL (25 Sep 2022 20:57)  POCT Blood Glucose.: 146 mg/dL (25 Sep 2022 18:22)  POCT Blood Glucose.: 145 mg/dL (25 Sep 2022 16:07)  POCT Blood Glucose.: 139 mg/dL (25 Sep 2022 14:22)  POCT Blood Glucose.: 135 mg/dL (25 Sep 2022 11:05)                       11.0   8.22  )-----------( 203      ( 26 Sep 2022 04:08 )             31.2       Auto Neutrophil %: 81.0 % (09-26-22 @ 04:08)  Auto Immature Granulocyte %: 0.7 % (09-26-22 @ 04:08)    09-26    138  |  106  |  14  ----------------------------<  131<H>  3.1<L>   |  23  |  0.5<L>    Calcium, Total Serum: 7.3 mg/dL (09-26-22 @ 04:08)    LFTs:             4.8  | 0.3  | 391      ------------------[58      ( 25 Sep 2022 05:40 )  2.8  | x    | 238         Lipase:x      Amylase:x         Blood Gas Arterial, Lactate: 1.30 mmol/L (09-26-22 @ 04:09)  Blood Gas Arterial, Lactate: 1.30 mmol/L (09-25-22 @ 06:49)  Blood Gas Arterial, Lactate: 1.40 mmol/L (09-25-22 @ 01:22)  Blood Gas Arterial, Lactate: 1.70 mmol/L (09-24-22 @ 23:08)  Blood Gas Arterial, Lactate: 2.00 mmol/L (09-24-22 @ 21:25)  Lactate, Blood: 1.8 mmol/L (09-24-22 @ 21:20)  Blood Gas Arterial, Lactate: 2.20 mmol/L (09-24-22 @ 18:12)  Lactate, Blood: 3.3 mmol/L (09-24-22 @ 11:34)  Blood Gas Arterial, Lactate: 1.80 mmol/L (09-24-22 @ 08:16)    ABG - ( 26 Sep 2022 04:09 )  pH: 7.44  /  pCO2: 36    /  pO2: 181   / HCO3: 24    / Base Excess: 0.5   /  SaO2: x         ABG - ( 25 Sep 2022 06:49 )  pH: 7.44  /  pCO2: 30    /  pO2: 103   / HCO3: 20    / Base Excess: -2.9  /  SaO2: 98.9      ABG - ( 25 Sep 2022 01:22 )  pH: 7.43  /  pCO2: 30    /  pO2: 134   / HCO3: 20    / Base Excess: -3.5  /  SaO2: 99.2      Coags:     13.40  ----< 1.17    ( 25 Sep 2022 02:10 )     28.6      CARDIAC MARKERS ( 26 Sep 2022 04:08 )  x     / x     / 09140 U/L / x     / x      CARDIAC MARKERS ( 25 Sep 2022 07:39 )  x     / x     / 39532 U/L / x     / x      CARDIAC MARKERS ( 25 Sep 2022 05:40 )  x     / x     / 93671 U/L / x     / x      CARDIAC MARKERS ( 24 Sep 2022 19:54 )  x     / 0.84 ng/mL / 22570 U/L / x     / x      CARDIAC MARKERS ( 24 Sep 2022 13:51 )  x     / 1.36 ng/mL / x     / x     / x        Culture - Fungal, Body Fluid (collected 24 Sep 2022 19:00)  Source: .Body Fluid None  Preliminary Report (26 Sep 2022 07:28):    Testing in progress    Culture - Acid Fast - Body Fluid w/Smear (collected 24 Sep 2022 19:00)  Source: .Body Fluid None    Culture - Body Fluid with Gram Stain (collected 24 Sep 2022 19:00)  Source: .Body Fluid None  Gram Stain (25 Sep 2022 04:32):    No polymorphonuclear leukocytes seen    No organisms seen    by cytocentrifuge  Preliminary Report (25 Sep 2022 17:56):    No growth    Culture - Fungal, Tissue (collected 24 Sep 2022 17:30)  Source: .Tissue None  Preliminary Report (26 Sep 2022 07:27):    Testing in progress    Culture - Acid Fast - Tissue w/Smear (collected 24 Sep 2022 17:30)  Source: .Tissue None    Culture - Tissue with Gram Stain (collected 24 Sep 2022 17:30)  Source: .Tissue None  Gram Stain (25 Sep 2022 04:31):    No polymorphonuclear leukocytes seen per low power field    No organisms seen per oil power field  Preliminary Report (25 Sep 2022 21:31):    No growth    IMAGING:   AM CXR stable

## 2022-09-26 NOTE — PROGRESS NOTE ADULT - SUBJECTIVE AND OBJECTIVE BOX
OPERATIVE PROCEDURE(s):                POD #     s/p pericardial window                  22yMale  SURGEON(s): BAM Montesinos  SUBJECTIVE ASSESSMENT:     Vital Signs Last 24 Hrs  T(F): 97.2 (26 Sep 2022 04:00), Max: 98 (25 Sep 2022 14:00)  HR: 83 (26 Sep 2022 06:15) (83 - 118)  BP: 114/67 (25 Sep 2022 11:00) (111/69 - 114/67)  BP(mean): 84 (25 Sep 2022 11:00) (80 - 86)  ABP: 114/83 (26 Sep 2022 06:15) (93/81 - 118/77)  ABP(mean): 93 (26 Sep 2022 06:15)  RR: 21 (26 Sep 2022 06:15) (19 - 46)  SpO2: 99% (26 Sep 2022 06:15) (95% - 99%)  CVP(mm Hg): --  CVP(cm H2O): --  CO: --  CI: --  PA: --  SVR: --    I&O's Detail    25 Sep 2022 07:01  -  26 Sep 2022 07:00  --------------------------------------------------------  IN:    Insulin: 86 mL    IV PiggyBack: 300 mL    Oral Fluid: 2240 mL    sodium chloride 0.9%: 4800 mL  Total IN: 7426 mL    OUT:    Chest Tube (mL): 125 mL    Indwelling Catheter - Urethral (mL): 4630 mL  Total OUT: 4755 mL        Net:   I&O's Detail    24 Sep 2022 07:01  -  25 Sep 2022 07:00  --------------------------------------------------------  Total NET: 1314.8 mL      25 Sep 2022 07:01  -  26 Sep 2022 07:00  --------------------------------------------------------  Total NET: 2671 mL        CAPILLARY BLOOD GLUCOSE      POCT Blood Glucose.: 129 mg/dL (26 Sep 2022 06:48)  POCT Blood Glucose.: 137 mg/dL (26 Sep 2022 02:34)  POCT Blood Glucose.: 136 mg/dL (26 Sep 2022 01:25)  POCT Blood Glucose.: 128 mg/dL (25 Sep 2022 23:22)  POCT Blood Glucose.: 157 mg/dL (25 Sep 2022 20:57)  POCT Blood Glucose.: 146 mg/dL (25 Sep 2022 18:22)  POCT Blood Glucose.: 145 mg/dL (25 Sep 2022 16:07)  POCT Blood Glucose.: 139 mg/dL (25 Sep 2022 14:22)  POCT Blood Glucose.: 135 mg/dL (25 Sep 2022 11:05)  POCT Blood Glucose.: 124 mg/dL (25 Sep 2022 08:19)    Physical Exam:  General: NAD; A&Ox3  Cardiac: S1/S2, RRR, no murmur, no rubs  Lungs: unlabored shallow respirations, bilateral bs   Abdomen: Soft/NT/protuberant  Sternum: Intact, no click, incision healing well with no drainage  Incisions: Incisions clean/dry/intact  Extremities: No edema b/l lower extremities    Central Venous Catheter: Yes[]  critical patient   Vasquez Catheter: Yes  [] , critical patient strict I&O  NGT: Yes []   EPICARDIAL WIRES:  [] YES  BOWEL MOVEMENT:  [] YES [] NO,   CHEST TUBE(Left/Right):  [] YES [] NO      LABS:                        11.0<L>  8.22  )-----------( 203      ( 26 Sep 2022 04:08 )             31.2<L>                        11.9<L>  7.60  )-----------( 153      ( 25 Sep 2022 05:40 )             33.5<L>    09-26    138  |  106  |  14  ----------------------------<  131<H>  3.1<L>   |  23  |  0.5<L>  09-25    136  |  107  |  15  ----------------------------<  130<H>  3.7   |  18  |  0.5<L>    Ca    7.3<L>      26 Sep 2022 04:08  Phos  2.7     09-25  Mg     2.5     09-25    TPro  4.8<L> [6.0 - 8.0]  /  Alb  2.8<L> [3.5 - 5.2]  /  TBili  0.3 [0.2 - 1.2]  /  DBili  x   /  AST  391<H> [0 - 41]  /  ALT  238<H> [0 - 41]  /  AlkPhos  58 [30 - 115]  09-25    PT/INR - ( 25 Sep 2022 02:10 )   PT: ;   INR: 1.17 ratio         PT/INR - ( 24 Sep 2022 19:54 )   PT: ;   INR: 1.13 ratio         PTT - ( 25 Sep 2022 02:10 )  PTT:28.6 sec, PTT - ( 24 Sep 2022 19:54 )  PTT:27.8 sec    ABG - ( 26 Sep 2022 04:09 )  pH: 7.44  /  pCO2: 36    /  pO2: 181   / HCO3: 24    / Base Excess: 0.5   /  SaO2: x     /  LA: 1.30             RADIOLOGY & ADDITIONAL TESTS:  CXR:  EKG:  MEDICATIONS  (STANDING):  ampicillin/sulbactam  IVPB 3 Gram(s) IV Intermittent every 6 hours  calcium carbonate    500 mG (Tums) Chewable 2 Tablet(s) Chew every 8 hours  calcium gluconate IVPB 2 Gram(s) IV Intermittent once  dextrose 50% Injectable 50 milliLiter(s) IV Push every 15 minutes  enoxaparin Injectable 40 milliGRAM(s) SubCutaneous every 24 hours  ibuprofen  Tablet. 400 milliGRAM(s) Oral every 8 hours  insulin regular Infusion 10 Unit(s)/Hr (10 mL/Hr) IV Continuous <Continuous>  meperidine     Injectable 25 milliGRAM(s) IV Push once  methylPREDNISolone sodium succinate Injectable 60 milliGRAM(s) IV Push every 12 hours  metoprolol tartrate 12.5 milliGRAM(s) Oral every 12 hours  pantoprazole    Tablet 40 milliGRAM(s) Oral before breakfast  sodium bicarbonate 650 milliGRAM(s) Oral every 8 hours  sodium chloride 0.9%. 1000 milliLiter(s) (200 mL/Hr) IV Continuous <Continuous>    MEDICATIONS  (PRN):  acetaminophen     Tablet .. 650 milliGRAM(s) Oral every 6 hours PRN Temp greater or equal to 38C (100.4F), Mild Pain (1 - 3)  aluminum hydroxide/magnesium hydroxide/simethicone Suspension 30 milliLiter(s) Oral every 4 hours PRN Dyspepsia  HYDROmorphone  Injectable 0.5 milliGRAM(s) IV Push every 6 hours PRN Breakthrough Pain  melatonin 3 milliGRAM(s) Oral at bedtime PRN Insomnia  ondansetron Injectable 4 milliGRAM(s) IV Push every 8 hours PRN Nausea and/or Vomiting  oxyCODONE    IR 5 milliGRAM(s) Oral every 4 hours PRN Moderate Pain (4 - 6)  oxyCODONE    IR 10 milliGRAM(s) Oral every 4 hours PRN Severe Pain (7 - 10)    HEPARIN:  [] YES [] NO   LOVENOX:[] YES [] NO   SCD's: YES b/l  GI Prophylaxis: Protonix [], Pepcid [],    Post-Op Beta-Blockers: Yes [], No[], If No, then contraindication:  Post-Op Aspirin: Yes [],  No [], If No, then contraindication:  Post-Op Statin: Yes [], No[], If No, then contraindication:  Allergies    No Known Allergies    Intolerances      Ambulation/Activity Status:    Assessment/Plan:  22y Male status-post .....  - Case and plan discussed with CTU Intensivist and CT Surgeon - Dr. Porter/Thor/Mine  - Continue CTU supportive care    - Continue DVT  - Continue GI prophylaxis  - Incentive Spirometry 10 times an hour  - Continue to advance physical activity as tolerated and continue PT/OT as directed  - CAD: Continue ASA, statin, BB  - Anemia secondary to:  _____ Chronic Disease    ______ Acute PO blood loss anemia,              track and trend H/H  - Bradycardia:    - RORY (serum cr increase 0.3 over 48hr or rises 1.5 fold  over baseline  - fluid overload secondary too: ____   acute non cardiac fluid over load     - Heart failure:   ____ Acute     ___Chronic:   ____ Diastolic    _____ Systolic        ______ Combined Disastolic on Systolic  - A. Fib:  _____ none _____    Persistent   ______ Chronic   ______      Paroxysmal; Treatment -   - COPD/Hypoxia:   - DM/Glucose Control:     Social Service Disposition:     OPERATIVE PROCEDURE(s):                POD #     s/p pericardial window                  22yMale  SURGEON(s): BAM Montesinos  SUBJECTIVE ASSESSMENT: doing well    Vital Signs Last 24 Hrs  T(F): 97.2 (26 Sep 2022 04:00), Max: 98 (25 Sep 2022 14:00)  HR: 83 (26 Sep 2022 06:15) (83 - 118)  BP: 114/67 (25 Sep 2022 11:00) (111/69 - 114/67)  BP(mean): 84 (25 Sep 2022 11:00) (80 - 86)  ABP: 114/83 (26 Sep 2022 06:15) (93/81 - 118/77)  ABP(mean): 93 (26 Sep 2022 06:15)  RR: 21 (26 Sep 2022 06:15) (19 - 46)  SpO2: 99% (26 Sep 2022 06:15) (95% - 99%)    I&O's Detail    25 Sep 2022 07:01  -  26 Sep 2022 07:00  --------------------------------------------------------  IN:    Insulin: 86 mL    IV PiggyBack: 300 mL    Oral Fluid: 2240 mL    sodium chloride 0.9%: 4800 mL  Total IN: 7426 mL    OUT:    Chest Tube (mL): 125 mL    Indwelling Catheter - Urethral (mL): 4630 mL  Total OUT: 4755 mL    Net:   I&O's Detail    24 Sep 2022 07:01  -  25 Sep 2022 07:00  --------------------------------------------------------  Total NET: 1314.8 mL    25 Sep 2022 07:01  -  26 Sep 2022 07:00  --------------------------------------------------------  Total NET: 2671 mL      CAPILLARY BLOOD GLUCOSE    POCT Blood Glucose.: 129 mg/dL (26 Sep 2022 06:48)  POCT Blood Glucose.: 137 mg/dL (26 Sep 2022 02:34)  POCT Blood Glucose.: 136 mg/dL (26 Sep 2022 01:25)  POCT Blood Glucose.: 128 mg/dL (25 Sep 2022 23:22)  POCT Blood Glucose.: 157 mg/dL (25 Sep 2022 20:57)  POCT Blood Glucose.: 146 mg/dL (25 Sep 2022 18:22)  POCT Blood Glucose.: 145 mg/dL (25 Sep 2022 16:07)  POCT Blood Glucose.: 139 mg/dL (25 Sep 2022 14:22)  POCT Blood Glucose.: 135 mg/dL (25 Sep 2022 11:05)  POCT Blood Glucose.: 124 mg/dL (25 Sep 2022 08:19)    Physical Exam:  General: NAD; A&Ox3  Cardiac: S1/S2, RRR, no murmur, no rubs  Lungs: unlabored shallow respirations, bilateral bs decreased at bases  Abdomen: Soft/NT/protuberant  Incisions: Incisions clean/dry/intact  Extremities: No significant edema bilateral lower extremities    Fernando Catheter: Yes  [] , critical patient strict I&O  BOWEL MOVEMENT:   NO,   CHEST TUBE(Left/Right):  [] YES       LABS:                        11.0<L>  8.22  )-----------( 203      ( 26 Sep 2022 04:08 )             31.2<L>                        11.9<L>  7.60  )-----------( 153      ( 25 Sep 2022 05:40 )             33.5<L>    09-26    138  |  106  |  14  ----------------------------<  131<H>  3.1<L>   |  23  |  0.5<L>  09-25    136  |  107  |  15  ----------------------------<  130<H>  3.7   |  18  |  0.5<L>    Ca    7.3<L>      26 Sep 2022 04:08  Phos  2.7     09-25  Mg     2.5     09-25    TPro  4.8<L> [6.0 - 8.0]  /  Alb  2.8<L> [3.5 - 5.2]  /  TBili  0.3 [0.2 - 1.2]  /  DBili  x   /  AST  391<H> [0 - 41]  /  ALT  238<H> [0 - 41]  /  AlkPhos  58 [30 - 115]  09-25    PT/INR - ( 25 Sep 2022 02:10 )   PT: ;   INR: 1.17 ratio         PT/INR - ( 24 Sep 2022 19:54 )   PT: ;   INR: 1.13 ratio         PTT - ( 25 Sep 2022 02:10 )  PTT:28.6 sec, PTT - ( 24 Sep 2022 19:54 )  PTT:27.8 sec    ABG - ( 26 Sep 2022 04:09 )  pH: 7.44  /  pCO2: 36    /  pO2: 181   / HCO3: 24    / Base Excess: 0.5   /  SaO2: x     /  LA: 1.30     RADIOLOGY & ADDITIONAL TESTS:  CXR: < from: Xray Chest 1 View- PORTABLE-Routine (09.26.22 @ 06:32) >  Stable appearance of left greater than right pleural effusions.    < end of copied text >  < from: 12 Lead ECG (09.25.22 @ 09:47) >  Ventricular Rate 113 BPM    Atrial Rate 113 BPM    P-R Interval 192 ms    QRS Duration 82 ms    Q-T Interval 314 ms    QTC Calculation(Bazett) 430 ms    P Axis 43 degrees    R Axis 15 degrees    T Axis 260 degrees    Diagnosis Line Sinus tachycardia  Low voltage QRS  T wave abnormality, consider anterolateral ischemia  Abnormal ECG    < end of copied text >    EKG:  MEDICATIONS  (STANDING):  ampicillin/sulbactam  IVPB 3 Gram(s) IV Intermittent every 6 hours  calcium carbonate    500 mG (Tums) Chewable 2 Tablet(s) Chew every 8 hours  calcium gluconate IVPB 2 Gram(s) IV Intermittent once  dextrose 50% Injectable 50 milliLiter(s) IV Push every 15 minutes  enoxaparin Injectable 40 milliGRAM(s) SubCutaneous every 24 hours  ibuprofen  Tablet. 400 milliGRAM(s) Oral every 8 hours  insulin regular Infusion 10 Unit(s)/Hr (10 mL/Hr) IV Continuous <Continuous>  meperidine     Injectable 25 milliGRAM(s) IV Push once  methylPREDNISolone sodium succinate Injectable 60 milliGRAM(s) IV Push every 12 hours  metoprolol tartrate 12.5 milliGRAM(s) Oral every 12 hours  pantoprazole    Tablet 40 milliGRAM(s) Oral before breakfast  sodium bicarbonate 650 milliGRAM(s) Oral every 8 hours  sodium chloride 0.9%. 1000 milliLiter(s) (200 mL/Hr) IV Continuous <Continuous>    MEDICATIONS  (PRN):  acetaminophen     Tablet .. 650 milliGRAM(s) Oral every 6 hours PRN Temp greater or equal to 38C (100.4F), Mild Pain (1 - 3)  aluminum hydroxide/magnesium hydroxide/simethicone Suspension 30 milliLiter(s) Oral every 4 hours PRN Dyspepsia  HYDROmorphone  Injectable 0.5 milliGRAM(s) IV Push every 6 hours PRN Breakthrough Pain  melatonin 3 milliGRAM(s) Oral at bedtime PRN Insomnia  ondansetron Injectable 4 milliGRAM(s) IV Push every 8 hours PRN Nausea and/or Vomiting  oxyCODONE    IR 5 milliGRAM(s) Oral every 4 hours PRN Moderate Pain (4 - 6)  oxyCODONE    IR 10 milliGRAM(s) Oral every 4 hours PRN Severe Pain (7 - 10)    Allergies    No Known Allergies    Intolerances      Ambulation/Activity Status: ambulate as tolerated    Assessment/Plan:  22y Male status-post pericardial window for pericardial effusion with early tamponade physiology and drainage of left pleural effusion  - Case and plan discussed with CTU Intensivist and CT Surgeon - Dr. Porter/Thor/Mine  - Continue CTU supportive care    - Continue DVT prophylaxis  - Continue GI prophylaxis  - Incentive Spirometry 10 times an hour  - Continue to advance physical activity as tolerated and continue PT/OT as directed  - please remove fernando and arterial line  - hypokalemia - potassium replacement  - IV hydration  decrease to 150cc hr due to rhabdomyolysis   - Anemia secondary to:  most likely Chronic Disease      - order QuantiFeron serum   -Keep chest tube for today    Social Service Disposition:  to be determined

## 2022-09-26 NOTE — BRIEF OPERATIVE NOTE - NSICDXBRIEFPOSTOP_GEN_ALL_CORE_FT
POST-OP DIAGNOSIS:  Pericardial effusion 24-Sep-2022 17:54:09  Burak Delatorre  
POST-OP DIAGNOSIS:  Pericardial effusion 24-Sep-2022 17:54:09  Burak Delatorre

## 2022-09-26 NOTE — BRIEF OPERATIVE NOTE - NSICDXBRIEFPREOP_GEN_ALL_CORE_FT
PRE-OP DIAGNOSIS:  Pericardial effusion 24-Sep-2022 17:53:59  Burak Delatorre  
PRE-OP DIAGNOSIS:  Pericardial effusion 24-Sep-2022 17:53:59  Burak Delatorre

## 2022-09-26 NOTE — BRIEF OPERATIVE NOTE - NSICDXBRIEFPROCEDURE_GEN_ALL_CORE_FT
PROCEDURES:  Creation, pericardial window, open 24-Sep-2022 17:53:43 Left Thoracotomy Burak Delatorre  
PROCEDURES:  Creation, pericardial window, open 24-Sep-2022 17:53:43 Left Thoracotomy Burak Delatorre

## 2022-09-26 NOTE — PROGRESS NOTE ADULT - SUBJECTIVE AND OBJECTIVE BOX
Nephrology progress note    THIS IS AN INCOMPLETE NOTE . FULL NOTE TO FOLLOW SHORTLY    Patient is seen and examined, events over the last 24 h noted .    Allergies:  No Known Allergies    Hospital Medications:   MEDICATIONS  (STANDING):  ampicillin/sulbactam  IVPB 3 Gram(s) IV Intermittent every 6 hours  calcium carbonate    500 mG (Tums) Chewable 2 Tablet(s) Chew every 8 hours  calcium gluconate IVPB 2 Gram(s) IV Intermittent once  dextrose 50% Injectable 50 milliLiter(s) IV Push every 15 minutes  enoxaparin Injectable 40 milliGRAM(s) SubCutaneous every 24 hours  ibuprofen  Tablet. 400 milliGRAM(s) Oral every 8 hours  insulin regular Infusion 10 Unit(s)/Hr (10 mL/Hr) IV Continuous <Continuous>  meperidine     Injectable 25 milliGRAM(s) IV Push once  methylPREDNISolone sodium succinate Injectable 60 milliGRAM(s) IV Push every 12 hours  metoprolol tartrate 12.5 milliGRAM(s) Oral every 12 hours  pantoprazole    Tablet 40 milliGRAM(s) Oral before breakfast  sodium bicarbonate 650 milliGRAM(s) Oral every 8 hours  sodium chloride 0.9%. 1000 milliLiter(s) (200 mL/Hr) IV Continuous <Continuous>        VITALS:  T(F): 97.2 (22 @ 04:00), Max: 98 (22 @ 14:00)  HR: 101 (22 @ 08:00)  BP: 114/67 (22 @ 11:00)  RR: 22 (22 @ 08:00)  SpO2: 98% (22 @ 08:00)  Wt(kg): --     @ 07:  -   @ 07:00  --------------------------------------------------------  IN: 4964.8 mL / OUT: 3650 mL / NET: 1314.8 mL     @ 07:01  -   @ 07:00  --------------------------------------------------------  IN: 7426 mL / OUT: 4755 mL / NET: 2671 mL     @ 07:01  -   @ 08:45  --------------------------------------------------------  IN: 442 mL / OUT: 360 mL / NET: 82 mL          PHYSICAL EXAM:  Constitutional: NAD  HEENT: anicteric sclera, oropharynx clear, MMM  Neck: No JVD  Respiratory: CTAB, no wheezes, rales or rhonchi  Cardiovascular: S1, S2, RRR  Gastrointestinal: BS+, soft, NT/ND  Extremities: No cyanosis or clubbing. No peripheral edema  :  No fernando.   Skin: No rashes    LABS:      138  |  106  |  14  ----------------------------<  131<H>  3.1<L>   |  23  |  0.5<L>    Ca    7.3<L>      26 Sep 2022 04:08  Phos  2.7       Mg     2.5         TPro  4.8<L>  /  Alb  2.8<L>  /  TBili  0.3  /  DBili      /  AST  391<H>  /  ALT  238<H>  /  AlkPhos  58      Creatine Kinase, Serum: 28781 U/L (22 @ 04:08)                            11.0   8.22  )-----------( 203      ( 26 Sep 2022 04:08 )             31.2       Urine Studies:  Urinalysis Basic - ( 22 Sep 2022 03:15 )    Color: Yellow / Appearance: Clear / S.039 / pH:   Gluc:  / Ketone: Moderate  / Bili: Negative / Urobili: 3 mg/dL   Blood:  / Protein: 30 mg/dL / Nitrite: Negative   Leuk Esterase: Negative / RBC: 1 /HPF / WBC 1 /HPF   Sq Epi:  / Non Sq Epi: 2 /HPF / Bacteria: Negative      Sodium, Random Urine: 65.0 mmoL/L (09-23 @ 19:30)  Creatinine, Random Urine: 104 mg/dL ( 19:30)  Protein/Creatinine Ratio Calculation: 0.8 Ratio (:)  Osmolality, Random Urine: 889 mos/kg ( @ 19:30)  Potassium, Random Urine: 50 mmol/L ( 19:30)      Iron 17, TIBC 204, %sat 8      [22 @ 17:59]  Ferritin 1084      [22 @ 11:34]  TSH 1.11      [22 @ 11:34]    HBsAg Nonreact      [22 @ 05:57]  HCV 0.11, Nonreact      [22 @ 05:57]  HIV Nonreact      [22 @ 11:49]        RADIOLOGY & ADDITIONAL STUDIES:   Nephrology progress note  Patient is seen and examined, events over the last 24 h noted .  Lying in bed comfortable  still has pericardial window     Allergies:  No Known Allergies    Hospital Medications:     MEDICATIONS  (STANDING):    ampicillin/sulbactam  IVPB 3 Gram(s) IV Intermittent every 6 hours  calcium carbonate    500 mG (Tums) Chewable 2 Tablet(s) Chew every 8 hours  calcium gluconate IVPB 2 Gram(s) IV Intermittent once  dextrose 50% Injectable 50 milliLiter(s) IV Push every 15 minutes  enoxaparin Injectable 40 milliGRAM(s) SubCutaneous every 24 hours  ibuprofen  Tablet. 400 milliGRAM(s) Oral every 8 hours  insulin regular Infusion 10 Unit(s)/Hr (10 mL/Hr) IV Continuous <Continuous>  meperidine     Injectable 25 milliGRAM(s) IV Push once  methylPREDNISolone sodium succinate Injectable 60 milliGRAM(s) IV Push every 12 hours  metoprolol tartrate 12.5 milliGRAM(s) Oral every 12 hours  pantoprazole    Tablet 40 milliGRAM(s) Oral before breakfast  sodium bicarbonate 650 milliGRAM(s) Oral every 8 hours  sodium chloride 0.9%. 1000 milliLiter(s) (200 mL/Hr) IV Continuous <Continuous>        VITALS:  T(F): 97.2 (22 @ 04:00), Max: 98 (22 @ 14:00)  HR: 101 (22 @ 08:00)  BP: 114/67 (22 @ 11:00)  RR: 22 (22 @ 08:00)  SpO2: 98% (22 @ 08:00)       @ :  -   @ 07:00  --------------------------------------------------------  IN: 4964.8 mL / OUT: 3650 mL / NET: 1314.8 mL     @ 07:01  -   @ 07:00  --------------------------------------------------------  IN: 7426 mL / OUT: 4755 mL / NET: 2671 mL     @ 07:01  -   @ 08:45  --------------------------------------------------------  IN: 442 mL / OUT: 360 mL / NET: 82 mL          PHYSICAL EXAM:    Constitutional: NAD  Neck: No JVD  Respiratory: CTAB,  Cardiovascular: S1, S2, RRR  Gastrointestinal: BS+, soft, NT/ND  Extremities: No cyanosis or clubbing. No peripheral edema  :  No fernando.   Skin: No rashes    LABS:      138  |  106  |  14  ----------------------------<  131<H>  3.1<L>   |  23  |  0.5<L>    Ca    7.3<L>      26 Sep 2022 04:08  Phos  2.7       Mg     2.5         TPro  4.8<L>  /  Alb  2.8<L>  /  TBili  0.3  /  DBili      /  AST  391<H>  /  ALT  238<H>  /  AlkPhos  58      Creatine Kinase, Serum: 55849 U/L (22 @ 04:08)                            11.0   8.22  )-----------( 203      ( 26 Sep 2022 04:08 )             31.2       Urine Studies:  Urinalysis Basic - ( 22 Sep 2022 03:15 )    Color: Yellow / Appearance: Clear / S.039 / pH:   Gluc:  / Ketone: Moderate  / Bili: Negative / Urobili: 3 mg/dL   Blood:  / Protein: 30 mg/dL / Nitrite: Negative   Leuk Esterase: Negative / RBC: 1 /HPF / WBC 1 /HPF   Sq Epi:  / Non Sq Epi: 2 /HPF / Bacteria: Negative      Sodium, Random Urine: 65.0 mmoL/L ( @ 19:30)  Creatinine, Random Urine: 104 mg/dL ( @ 19:30)  Protein/Creatinine Ratio Calculation: 0.8 Ratio ( @ 19:30)  Osmolality, Random Urine: 889 mos/kg ( @ 19:30)  Potassium, Random Urine: 50 mmol/L ( @ 19:30)      Iron 17, TIBC 204, %sat 8      [22 @ 17:59]  Ferritin 1084      [22 @ 11:34]  TSH 1.11      [22 @ 11:34]    HBsAg Nonreact      [22 @ 05:57]  HCV 0.11, Nonreact      [22 @ 05:57]  HIV Nonreact      [22 @ 11:49]        RADIOLOGY & ADDITIONAL STUDIES:

## 2022-09-27 LAB
ALDOLASE SERPL-CCNC: 71.7 U/L — HIGH (ref 3.3–10.3)
ANION GAP SERPL CALC-SCNC: 7 MMOL/L — SIGNIFICANT CHANGE UP (ref 7–14)
ANTI-RIBONUCLEAR PROTEIN: <0.2 AI — SIGNIFICANT CHANGE UP
ANTI-RIBONUCLEAR PROTEIN: <0.2 AI — SIGNIFICANT CHANGE UP
AUTO DIFF PNL BLD: NEGATIVE — SIGNIFICANT CHANGE UP
AUTO DIFF PNL BLD: NEGATIVE — SIGNIFICANT CHANGE UP
BUN SERPL-MCNC: 13 MG/DL — SIGNIFICANT CHANGE UP (ref 10–20)
C-ANCA SER-ACNC: NEGATIVE — SIGNIFICANT CHANGE UP
C-ANCA SER-ACNC: NEGATIVE — SIGNIFICANT CHANGE UP
C3 SERPL-MCNC: 122 MG/DL — SIGNIFICANT CHANGE UP (ref 81–157)
C4 SERPL-MCNC: 23 MG/DL — SIGNIFICANT CHANGE UP (ref 13–39)
CALCIUM SERPL-MCNC: 7.6 MG/DL — LOW (ref 8.4–10.5)
CCP IGG SERPL-ACNC: <8 UNITS — SIGNIFICANT CHANGE UP
CENTROMERE AB SER-ACNC: <0.2 AI — SIGNIFICANT CHANGE UP
CHLORIDE SERPL-SCNC: 103 MMOL/L — SIGNIFICANT CHANGE UP (ref 98–110)
CK SERPL-CCNC: 8252 U/L — HIGH (ref 0–225)
CO2 SERPL-SCNC: 27 MMOL/L — SIGNIFICANT CHANGE UP (ref 17–32)
CREAT SERPL-MCNC: <0.5 MG/DL — LOW (ref 0.7–1.5)
CULTURE RESULTS: SIGNIFICANT CHANGE UP
CULTURE RESULTS: SIGNIFICANT CHANGE UP
DSDNA AB FLD-ACNC: <0.2 AI — SIGNIFICANT CHANGE UP
DSDNA AB SER-ACNC: <12 IU/ML — SIGNIFICANT CHANGE UP
EGFR: 159 ML/MIN/1.73M2 — SIGNIFICANT CHANGE UP
ENA JO1 AB SER-ACNC: <0.2 AI — SIGNIFICANT CHANGE UP
ENA SCL70 AB SER-ACNC: <0.2 AI — SIGNIFICANT CHANGE UP
ENA SS-A AB FLD IA-ACNC: <0.2 AI — SIGNIFICANT CHANGE UP
GAMMA INTERFERON BACKGROUND BLD IA-ACNC: 0.13 IU/ML — SIGNIFICANT CHANGE UP
GLUCOSE SERPL-MCNC: 127 MG/DL — HIGH (ref 70–99)
HCT VFR BLD CALC: 30.5 % — LOW (ref 42–52)
HGB BLD-MCNC: 10.6 G/DL — LOW (ref 14–18)
M TB IFN-G BLD-IMP: ABNORMAL
M TB IFN-G CD4+ BCKGRND COR BLD-ACNC: 0.02 IU/ML — SIGNIFICANT CHANGE UP
M TB IFN-G CD4+CD8+ BCKGRND COR BLD-ACNC: 0.02 IU/ML — SIGNIFICANT CHANGE UP
MCHC RBC-ENTMCNC: 27.7 PG — SIGNIFICANT CHANGE UP (ref 27–31)
MCHC RBC-ENTMCNC: 34.8 G/DL — SIGNIFICANT CHANGE UP (ref 32–37)
MCV RBC AUTO: 79.6 FL — LOW (ref 80–94)
MPO AB + PR3 PNL SER: SIGNIFICANT CHANGE UP
NRBC # BLD: 0 /100 WBCS — SIGNIFICANT CHANGE UP (ref 0–0)
P-ANCA SER-ACNC: NEGATIVE — SIGNIFICANT CHANGE UP
P-ANCA SER-ACNC: NEGATIVE — SIGNIFICANT CHANGE UP
PLATELET # BLD AUTO: 255 K/UL — SIGNIFICANT CHANGE UP (ref 130–400)
POTASSIUM SERPL-MCNC: 3.5 MMOL/L — SIGNIFICANT CHANGE UP (ref 3.5–5)
POTASSIUM SERPL-SCNC: 3.5 MMOL/L — SIGNIFICANT CHANGE UP (ref 3.5–5)
QUANT TB PLUS MITOGEN MINUS NIL: 0.44 IU/ML — SIGNIFICANT CHANGE UP
RBC # BLD: 3.83 M/UL — LOW (ref 4.7–6.1)
RBC # FLD: 13.4 % — SIGNIFICANT CHANGE UP (ref 11.5–14.5)
RF+CCP IGG SER-IMP: NEGATIVE — SIGNIFICANT CHANGE UP
SMOOTH MUSCLE AB SER-ACNC: SIGNIFICANT CHANGE UP
SODIUM SERPL-SCNC: 137 MMOL/L — SIGNIFICANT CHANGE UP (ref 135–146)
SPECIMEN SOURCE: SIGNIFICANT CHANGE UP
SPECIMEN SOURCE: SIGNIFICANT CHANGE UP
WBC # BLD: 6.81 K/UL — SIGNIFICANT CHANGE UP (ref 4.8–10.8)
WBC # FLD AUTO: 6.81 K/UL — SIGNIFICANT CHANGE UP (ref 4.8–10.8)

## 2022-09-27 PROCEDURE — 93010 ELECTROCARDIOGRAM REPORT: CPT

## 2022-09-27 PROCEDURE — 71045 X-RAY EXAM CHEST 1 VIEW: CPT | Mod: 26

## 2022-09-27 RX ADMIN — Medication 650 MILLIGRAM(S): at 00:34

## 2022-09-27 RX ADMIN — AMPICILLIN SODIUM AND SULBACTAM SODIUM 200 GRAM(S): 250; 125 INJECTION, POWDER, FOR SUSPENSION INTRAMUSCULAR; INTRAVENOUS at 11:53

## 2022-09-27 RX ADMIN — Medication 20 MILLIEQUIVALENT(S): at 06:11

## 2022-09-27 RX ADMIN — PANTOPRAZOLE SODIUM 40 MILLIGRAM(S): 20 TABLET, DELAYED RELEASE ORAL at 06:11

## 2022-09-27 RX ADMIN — ENOXAPARIN SODIUM 40 MILLIGRAM(S): 100 INJECTION SUBCUTANEOUS at 23:22

## 2022-09-27 RX ADMIN — Medication 2 TABLET(S): at 14:55

## 2022-09-27 RX ADMIN — Medication 400 MILLIGRAM(S): at 14:55

## 2022-09-27 RX ADMIN — Medication 60 MILLIGRAM(S): at 06:12

## 2022-09-27 RX ADMIN — SODIUM CHLORIDE 150 MILLILITER(S): 9 INJECTION INTRAMUSCULAR; INTRAVENOUS; SUBCUTANEOUS at 08:00

## 2022-09-27 RX ADMIN — AMPICILLIN SODIUM AND SULBACTAM SODIUM 200 GRAM(S): 250; 125 INJECTION, POWDER, FOR SUSPENSION INTRAMUSCULAR; INTRAVENOUS at 06:10

## 2022-09-27 RX ADMIN — AMPICILLIN SODIUM AND SULBACTAM SODIUM 200 GRAM(S): 250; 125 INJECTION, POWDER, FOR SUSPENSION INTRAMUSCULAR; INTRAVENOUS at 17:34

## 2022-09-27 RX ADMIN — Medication 400 MILLIGRAM(S): at 21:24

## 2022-09-27 RX ADMIN — Medication 400 MILLIGRAM(S): at 22:00

## 2022-09-27 RX ADMIN — Medication 650 MILLIGRAM(S): at 14:55

## 2022-09-27 RX ADMIN — Medication 400 MILLIGRAM(S): at 06:40

## 2022-09-27 RX ADMIN — Medication 2 TABLET(S): at 06:10

## 2022-09-27 RX ADMIN — Medication 2 TABLET(S): at 21:24

## 2022-09-27 RX ADMIN — Medication 650 MILLIGRAM(S): at 21:24

## 2022-09-27 RX ADMIN — Medication 20 MILLIEQUIVALENT(S): at 17:34

## 2022-09-27 RX ADMIN — Medication 12.5 MILLIGRAM(S): at 06:12

## 2022-09-27 RX ADMIN — Medication 60 MILLIGRAM(S): at 17:35

## 2022-09-27 RX ADMIN — Medication 12.5 MILLIGRAM(S): at 17:34

## 2022-09-27 RX ADMIN — Medication 400 MILLIGRAM(S): at 06:10

## 2022-09-27 RX ADMIN — Medication 400 MILLIGRAM(S): at 15:06

## 2022-09-27 RX ADMIN — AMPICILLIN SODIUM AND SULBACTAM SODIUM 200 GRAM(S): 250; 125 INJECTION, POWDER, FOR SUSPENSION INTRAMUSCULAR; INTRAVENOUS at 00:34

## 2022-09-27 NOTE — PROGRESS NOTE ADULT - ASSESSMENT
23 yo M with no significant PMHx presents to the ED c/o R sided lower dental pain x 1 week with development of fever/diffuse myalgias x 3 days. Pt had gone work on Monday in his usual state of health. When he came home that night he had begun complaining of aches and feeling unwell. He was found to have right jaw soft tissue infection, in severe sepsis and was started on abx.  # rhabdomyolysis/ ? 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

## 2022-09-27 NOTE — PROGRESS NOTE ADULT - SUBJECTIVE AND OBJECTIVE BOX
GENERAL SURGERY PROGRESS NOTE    Patient: DEMETRIA LAZCANO , 22y ([birth year])Male   MRN: [id number]  Location: [address]  Visit: 09-22-22 Inpatient  Date: 09-27-22 @ 00:16    Procedure/Dx/Injuries: cardiac tamponade, s/p pericardial window    Events of past 24 hours: pericardial drain remains to suction, output 150. Patient states that SOB has improved since the procedure    PAST MEDICAL & SURGICAL HISTORY:  No pertinent past medical history    No significant past surgical history    Vitals:   T(F): 97.3 (09-26-22 @ 20:00), Max: 97.6 (09-26-22 @ 12:00)  HR: 101 (09-26-22 @ 23:00)  BP: 100/77 (09-26-22 @ 23:00)  RR: 34 (09-26-22 @ 23:00)  SpO2: 94% (09-26-22 @ 23:00)      Diet, Regular:   Consistent Carbohydrate No Snacks      Fluids: sodium chloride 0.9%.: Solution, 1000 milliLiter(s) infuse at 150 mL/Hr  Provider's Contact #: [phone]      I & O's:    09-25-22 @ 07:01  -  09-26-22 @ 07:00  --------------------------------------------------------  IN:    Insulin: 86 mL    IV PiggyBack: 300 mL    Oral Fluid: 2240 mL    sodium chloride 0.9%: 4800 mL  Total IN: 7426 mL    OUT:    Chest Tube (mL): 125 mL    Indwelling Catheter - Urethral (mL): 4630 mL  Total OUT: 4755 mL    Total NET: 2671 mL    Bowel Movement: : [] YES [] NO  Flatus: : [] YES [] NO    PHYSICAL EXAM:  General: NAD, AAOx3, calm and cooperative  Cardiac: RRR S1, S2, pericardial drain to suction  Respiratory: bilateral breath sounds   Abdomen: Soft, non-distended, non-tender,   Incision/wound: healing well, dressings in place, clean, dry and intact    MEDICATIONS  (STANDING):  ampicillin/sulbactam  IVPB 3 Gram(s) IV Intermittent every 6 hours  calcium carbonate    500 mG (Tums) Chewable 2 Tablet(s) Chew every 8 hours  calcium gluconate IVPB 2 Gram(s) IV Intermittent once  dextrose 50% Injectable 50 milliLiter(s) IV Push every 15 minutes  enoxaparin Injectable 40 milliGRAM(s) SubCutaneous every 24 hours  ibuprofen  Tablet. 400 milliGRAM(s) Oral every 8 hours  insulin regular Infusion 10 Unit(s)/Hr (10 mL/Hr) IV Continuous <Continuous>  meperidine     Injectable 25 milliGRAM(s) IV Push once  methylPREDNISolone sodium succinate Injectable 60 milliGRAM(s) IV Push every 12 hours  metoprolol tartrate 12.5 milliGRAM(s) Oral every 12 hours  pantoprazole    Tablet 40 milliGRAM(s) Oral before breakfast  potassium chloride    Tablet ER 20 milliEquivalent(s) Oral two times a day  sodium bicarbonate 650 milliGRAM(s) Oral every 8 hours  sodium chloride 0.9%. 1000 milliLiter(s) (150 mL/Hr) IV Continuous <Continuous>    MEDICATIONS  (PRN):  acetaminophen     Tablet .. 650 milliGRAM(s) Oral every 6 hours PRN Temp greater or equal to 38C (100.4F), Mild Pain (1 - 3)  aluminum hydroxide/magnesium hydroxide/simethicone Suspension 30 milliLiter(s) Oral every 4 hours PRN Dyspepsia  melatonin 3 milliGRAM(s) Oral at bedtime PRN Insomnia  ondansetron Injectable 4 milliGRAM(s) IV Push every 8 hours PRN Nausea and/or Vomiting  oxyCODONE    IR 5 milliGRAM(s) Oral every 4 hours PRN Moderate Pain (4 - 6)  oxyCODONE    IR 10 milliGRAM(s) Oral every 4 hours PRN Severe Pain (7 - 10)      DVT PROPHYLAXIS: enoxaparin Injectable 40 milliGRAM(s) SubCutaneous every 24 hours    GI PROPHYLAXIS: pantoprazole    Tablet 40 milliGRAM(s) Oral before breakfast    ANTICOAGULATION:   ANTIBIOTICS:  ampicillin/sulbactam  IVPB 3 Gram(s)    LAB/STUDIES:  Labs:  CAPILLARY BLOOD GLUCOSE    POCT Blood Glucose.: 123 mg/dL (26 Sep 2022 22:18)  POCT Blood Glucose.: 123 mg/dL (26 Sep 2022 19:50)  POCT Blood Glucose.: 135 mg/dL (26 Sep 2022 16:20)  POCT Blood Glucose.: 110 mg/dL (26 Sep 2022 13:09)  POCT Blood Glucose.: 148 mg/dL (26 Sep 2022 10:33)  POCT Blood Glucose.: 129 mg/dL (26 Sep 2022 06:48)  POCT Blood Glucose.: 137 mg/dL (26 Sep 2022 02:34)  POCT Blood Glucose.: 136 mg/dL (26 Sep 2022 01:25)                        11.0   8.22  )-----------( 203      ( 26 Sep 2022 04:08 )             31.2       Auto Neutrophil %: 81.0 % (09-26-22 @ 04:08)  Auto Immature Granulocyte %: 0.7 % (09-26-22 @ 04:08)    09-26    138  |  106  |  14  ----------------------------<  131<H>  3.1<L>   |  23  |  0.5<L>      Calcium, Total Serum: 7.3 mg/dL (09-26-22 @ 04:08)      LFTs:             4.8  | 0.3  | 391      ------------------[58      ( 25 Sep 2022 05:40 )  2.8  | x    | 238         Lipase:x      Amylase:x         Blood Gas Arterial, Lactate: 1.30 mmol/L (09-26-22 @ 04:09)  Blood Gas Arterial, Lactate: 1.30 mmol/L (09-25-22 @ 06:49)  Blood Gas Arterial, Lactate: 1.40 mmol/L (09-25-22 @ 01:22)  Blood Gas Arterial, Lactate: 1.70 mmol/L (09-24-22 @ 23:08)  Blood Gas Arterial, Lactate: 2.00 mmol/L (09-24-22 @ 21:25)  Lactate, Blood: 1.8 mmol/L (09-24-22 @ 21:20)  Blood Gas Arterial, Lactate: 2.20 mmol/L (09-24-22 @ 18:12)  Lactate, Blood: 3.3 mmol/L (09-24-22 @ 11:34)  Blood Gas Arterial, Lactate: 1.80 mmol/L (09-24-22 @ 08:16)    ABG - ( 26 Sep 2022 04:09 )  pH: 7.44  /  pCO2: 36    /  pO2: 181   / HCO3: 24    / Base Excess: 0.5   /  SaO2: x           ABG - ( 25 Sep 2022 06:49 )  pH: 7.44  /  pCO2: 30    /  pO2: 103   / HCO3: 20    / Base Excess: -2.9  /  SaO2: 98.9      ABG - ( 25 Sep 2022 01:22 )  pH: 7.43  /  pCO2: 30    /  pO2: 134   / HCO3: 20    / Base Excess: -3.5  /  SaO2: 99.2        Coags:     13.40  ----< 1.17    ( 25 Sep 2022 02:10 )     28.6        CARDIAC MARKERS ( 26 Sep 2022 04:08 )  x     / x     / 88485 U/L / x     / x      CARDIAC MARKERS ( 25 Sep 2022 07:39 )  x     / x     / 59174 U/L / x     / x      CARDIAC MARKERS ( 25 Sep 2022 05:40 )  x     / x     / 08891 U/L / x     / x            Culture - Fungal, Body Fluid (collected 24 Sep 2022 19:00)  Source: .Body Fluid None  Preliminary Report (26 Sep 2022 07:28):    Testing in progress    Culture - Acid Fast - Body Fluid w/Smear (collected 24 Sep 2022 19:00)  Source: .Body Fluid None    Culture - Body Fluid with Gram Stain (collected 24 Sep 2022 19:00)  Source: .Body Fluid None  Gram Stain (25 Sep 2022 04:32):    No polymorphonuclear leukocytes seen    No organisms seen    by cytocentrifuge  Preliminary Report (25 Sep 2022 17:56):    No growth    Culture - Fungal, Tissue (collected 24 Sep 2022 17:30)  Source: .Tissue None  Preliminary Report (26 Sep 2022 07:27):    Testing in progress    Culture - Acid Fast - Tissue w/Smear (collected 24 Sep 2022 17:30)  Source: .Tissue None    Culture - Tissue with Gram Stain (collected 24 Sep 2022 17:30)  Source: .Tissue None  Gram Stain (25 Sep 2022 04:31):    No polymorphonuclear leukocytes seen per low power field    No organisms seen per oil power field  Preliminary Report (25 Sep 2022 21:31):    No growth    IMAGING:  < from: Xray Chest 1 View- PORTABLE-Routine (09.26.22 @ 06:32) >  Impression:    Stable appearance of left greater than right pleural effusions.    < end of copied text >    ACCESS/ DEVICES:  [x ] Peripheral IV  [ ] Central Venous Line	[ ] R	[ ] L	[ ] IJ	[ ] Fem	[ ] SC	Placed:   [ ] Arterial Line		[ ] R	[ ] L	[ ] Fem	[ ] Rad	[ ] Ax	Placed:   [ ] PICC:					[ ] Mediport  [ ] Urinary Catheter,  Date Placed:   [ ] Chest tube: [ ] Right, [ ] Left  [ ] ARMEN/Jason Drains

## 2022-09-27 NOTE — PROGRESS NOTE ADULT - SUBJECTIVE AND OBJECTIVE BOX
seen and examined  24 h events noted   no distress  lying comfortable         PAST HISTORY  --------------------------------------------------------------------------------  No significant changes to PMH, PSH, FHx, SHx, unless otherwise noted    ALLERGIES & MEDICATIONS  --------------------------------------------------------------------------------  Allergies    No Known Allergies    Intolerances      Standing Inpatient Medications  ampicillin/sulbactam  IVPB 3 Gram(s) IV Intermittent every 6 hours  calcium carbonate    500 mG (Tums) Chewable 2 Tablet(s) Chew every 8 hours  calcium gluconate IVPB 2 Gram(s) IV Intermittent once  dextrose 50% Injectable 50 milliLiter(s) IV Push every 15 minutes  enoxaparin Injectable 40 milliGRAM(s) SubCutaneous every 24 hours  ibuprofen  Tablet. 400 milliGRAM(s) Oral every 8 hours  insulin regular Infusion 10 Unit(s)/Hr IV Continuous <Continuous>  meperidine     Injectable 25 milliGRAM(s) IV Push once  methylPREDNISolone sodium succinate Injectable 60 milliGRAM(s) IV Push every 12 hours  metoprolol tartrate 12.5 milliGRAM(s) Oral every 12 hours  pantoprazole    Tablet 40 milliGRAM(s) Oral before breakfast  potassium chloride    Tablet ER 20 milliEquivalent(s) Oral two times a day  sodium bicarbonate 650 milliGRAM(s) Oral every 8 hours  sodium chloride 0.9%. 1000 milliLiter(s) IV Continuous <Continuous>    PRN Inpatient Medications  acetaminophen     Tablet .. 650 milliGRAM(s) Oral every 6 hours PRN  aluminum hydroxide/magnesium hydroxide/simethicone Suspension 30 milliLiter(s) Oral every 4 hours PRN  melatonin 3 milliGRAM(s) Oral at bedtime PRN  ondansetron Injectable 4 milliGRAM(s) IV Push every 8 hours PRN  oxyCODONE    IR 5 milliGRAM(s) Oral every 4 hours PRN  oxyCODONE    IR 10 milliGRAM(s) Oral every 4 hours PRN          VITALS/PHYSICAL EXAM  --------------------------------------------------------------------------------  T(C): 36.3 (09-27-22 @ 05:00), Max: 36.4 (09-26-22 @ 12:00)  HR: 91 (09-27-22 @ 08:00) (77 - 104)  BP: 107/67 (09-27-22 @ 07:00) (95/69 - 112/60)  RR: 23 (09-27-22 @ 08:00) (8 - 34)  SpO2: 96% (09-27-22 @ 08:00) (94% - 99%)  Wt(kg): --    Weight (kg): 75.7 (09-27-22 @ 05:00)      09-26-22 @ 07:01  -  09-27-22 @ 07:00  --------------------------------------------------------  IN: 4833 mL / OUT: 5700 mL / NET: -867 mL    09-27-22 @ 07:01  -  09-27-22 @ 08:59  --------------------------------------------------------  IN: 391 mL / OUT: 400 mL / NET: -9 mL      Physical Exam:  	Gen: NAD  	Pulm: decrease BS  B/L  	CV: S1S2; no rub  	Abd: soft, nontender/nondistended    	    LABS/STUDIES  --------------------------------------------------------------------------------              10.6   6.81  >-----------<  255      [09-27-22 @ 05:15]              30.5     137  |  103  |  13  ----------------------------<  127      [09-27-22 @ 05:15]  3.5   |  27  |  <0.5        Ca     7.6     [09-27-22 @ 05:15]      CK 04[address]      [09-26-22 @ 04:08]    Creatinine Trend:  SCr <0.5 [09-27 @ 05:15]  SCr 0.5 [09-26 @ 04:08]  SCr 0.5 [09-25 @ 05:40]  SCr 0.5 [09-25 @ 02:10]  SCr 0.5 [09-25 @ 00:20]    Urinalysis - [09-22-22 @ 03:15]      Color Yellow / Appearance Clear / SG 1.039 / pH 6.5      Gluc 500 mg/dL / Ketone Moderate  / Bili Negative / Urobili 3 mg/dL       Blood Moderate / Protein 30 mg/dL / Leuk Est Negative / Nitrite Negative      RBC 1 / WBC 1 / Hyaline 0 / Gran  / Sq Epi  / Non Sq Epi 2 / Bacteria Negative    Urine Creatinine 104      [09-23-22 @ 19:30]  Urine Protein 85      [09-23-22 @ 19:30]  Urine Sodium 65.0      [09-23-22 @ 19:30]  Urine Urea Nitrogen 1029      [09-23-22 @ 19:30]  Urine Potassium 50      [09-23-22 @ 19:30]  Urine Osmolality 889      [09-23-22 @ 19:30]    Iron 17, TIBC 204, %sat 8      [09-23-22 @ 17:59]  Ferritin 1084      [09-24-22 @ 11:34]  TSH 1.11      [09-24-22 @ 11:34]    HBsAg Nonreact      [09-23-22 @ 05:57]  HCV 0.11, Nonreact      [09-23-22 @ 05:57]  HIV Nonreact      [09-23-22 @ 11:49]    CAROLE: titer Negative, pattern --      [09-24-22 @ 11:34]  Rheumatoid Factor <10      [09-25-22 @ 05:40]

## 2022-09-27 NOTE — PROGRESS NOTE ADULT - ASSESSMENT
ASSESSMENT:  22y M w/ cardiac tamponade, s/p pericardial window    PLAN:  pericardial drain to suction, monitor output  monitor urine output  daily AM cxr   pain control     spectra [id number]

## 2022-09-28 ENCOUNTER — TRANSCRIPTION ENCOUNTER (OUTPATIENT)
Age: 22
End: 2022-09-28

## 2022-09-28 VITALS
DIASTOLIC BLOOD PRESSURE: 67 MMHG | RESPIRATION RATE: 16 BRPM | HEART RATE: 82 BPM | SYSTOLIC BLOOD PRESSURE: 95 MMHG | TEMPERATURE: 98 F | OXYGEN SATURATION: 97 %

## 2022-09-28 LAB
ALBUMIN SERPL ELPH-MCNC: 3.3 G/DL — LOW (ref 3.5–5.2)
ALP SERPL-CCNC: 67 U/L — SIGNIFICANT CHANGE UP (ref 30–115)
ALT FLD-CCNC: 277 U/L — HIGH (ref 0–41)
ANA TITR SER: NEGATIVE — SIGNIFICANT CHANGE UP
ANION GAP SERPL CALC-SCNC: 13 MMOL/L — SIGNIFICANT CHANGE UP (ref 7–14)
AST SERPL-CCNC: 225 U/L — HIGH (ref 0–41)
BILIRUB SERPL-MCNC: 0.3 MG/DL — SIGNIFICANT CHANGE UP (ref 0.2–1.2)
BUN SERPL-MCNC: 14 MG/DL — SIGNIFICANT CHANGE UP (ref 10–20)
CALCIUM SERPL-MCNC: 7.6 MG/DL — LOW (ref 8.4–10.5)
CHLORIDE SERPL-SCNC: 102 MMOL/L — SIGNIFICANT CHANGE UP (ref 98–110)
CK SERPL-CCNC: 4848 U/L — HIGH (ref 0–225)
CO2 SERPL-SCNC: 24 MMOL/L — SIGNIFICANT CHANGE UP (ref 17–32)
CREAT SERPL-MCNC: 0.5 MG/DL — LOW (ref 0.7–1.5)
EGFR: 149 ML/MIN/1.73M2 — SIGNIFICANT CHANGE UP
GAMMA INTERFERON BACKGROUND BLD IA-ACNC: 0.06 IU/ML — SIGNIFICANT CHANGE UP
GLUCOSE SERPL-MCNC: 108 MG/DL — HIGH (ref 70–99)
HCT VFR BLD CALC: 34.2 % — LOW (ref 42–52)
HGB BLD-MCNC: 11.9 G/DL — LOW (ref 14–18)
M TB IFN-G BLD-IMP: ABNORMAL
M TB IFN-G CD4+ BCKGRND COR BLD-ACNC: 0.01 IU/ML — SIGNIFICANT CHANGE UP
M TB IFN-G CD4+CD8+ BCKGRND COR BLD-ACNC: 0.01 IU/ML — SIGNIFICANT CHANGE UP
MAGNESIUM SERPL-MCNC: 2.5 MG/DL — HIGH (ref 1.8–2.4)
MCHC RBC-ENTMCNC: 27.9 PG — SIGNIFICANT CHANGE UP (ref 27–31)
MCHC RBC-ENTMCNC: 34.8 G/DL — SIGNIFICANT CHANGE UP (ref 32–37)
MCV RBC AUTO: 80.1 FL — SIGNIFICANT CHANGE UP (ref 80–94)
NRBC # BLD: 0 /100 WBCS — SIGNIFICANT CHANGE UP (ref 0–0)
PLATELET # BLD AUTO: 316 K/UL — SIGNIFICANT CHANGE UP (ref 130–400)
POTASSIUM SERPL-MCNC: 3.7 MMOL/L — SIGNIFICANT CHANGE UP (ref 3.5–5)
POTASSIUM SERPL-SCNC: 3.7 MMOL/L — SIGNIFICANT CHANGE UP (ref 3.5–5)
PROT SERPL-MCNC: 5.4 G/DL — LOW (ref 6–8)
QUANT TB PLUS MITOGEN MINUS NIL: 0.06 IU/ML — SIGNIFICANT CHANGE UP
RBC # BLD: 4.27 M/UL — LOW (ref 4.7–6.1)
RBC # FLD: 13.4 % — SIGNIFICANT CHANGE UP (ref 11.5–14.5)
SODIUM SERPL-SCNC: 139 MMOL/L — SIGNIFICANT CHANGE UP (ref 135–146)
WBC # BLD: 9.01 K/UL — SIGNIFICANT CHANGE UP (ref 4.8–10.8)
WBC # FLD AUTO: 9.01 K/UL — SIGNIFICANT CHANGE UP (ref 4.8–10.8)

## 2022-09-28 PROCEDURE — 71045 X-RAY EXAM CHEST 1 VIEW: CPT | Mod: 26,76

## 2022-09-28 PROCEDURE — 93010 ELECTROCARDIOGRAM REPORT: CPT

## 2022-09-28 PROCEDURE — 99231 SBSQ HOSP IP/OBS SF/LOW 25: CPT

## 2022-09-28 RX ORDER — SODIUM CHLORIDE 9 MG/ML
1000 INJECTION INTRAMUSCULAR; INTRAVENOUS; SUBCUTANEOUS
Refills: 0 | Status: DISCONTINUED | OUTPATIENT
Start: 2022-09-28 | End: 2022-09-28

## 2022-09-28 RX ORDER — METOPROLOL TARTRATE 50 MG
1 TABLET ORAL
Qty: 30 | Refills: 0
Start: 2022-09-28 | End: 2022-10-27

## 2022-09-28 RX ORDER — ACETAMINOPHEN 500 MG
2 TABLET ORAL
Qty: 0 | Refills: 0 | DISCHARGE
Start: 2022-09-28

## 2022-09-28 RX ORDER — POTASSIUM CHLORIDE 20 MEQ
20 PACKET (EA) ORAL ONCE
Refills: 0 | Status: DISCONTINUED | OUTPATIENT
Start: 2022-09-28 | End: 2022-09-28

## 2022-09-28 RX ORDER — METOPROLOL TARTRATE 50 MG
0.5 TABLET ORAL
Qty: 30 | Refills: 0
Start: 2022-09-28 | End: 2022-10-27

## 2022-09-28 RX ADMIN — Medication 400 MILLIGRAM(S): at 06:11

## 2022-09-28 RX ADMIN — Medication 12.5 MILLIGRAM(S): at 06:10

## 2022-09-28 RX ADMIN — Medication 650 MILLIGRAM(S): at 11:54

## 2022-09-28 RX ADMIN — Medication 60 MILLIGRAM(S): at 06:12

## 2022-09-28 RX ADMIN — PANTOPRAZOLE SODIUM 40 MILLIGRAM(S): 20 TABLET, DELAYED RELEASE ORAL at 06:11

## 2022-09-28 RX ADMIN — AMPICILLIN SODIUM AND SULBACTAM SODIUM 200 GRAM(S): 250; 125 INJECTION, POWDER, FOR SUSPENSION INTRAMUSCULAR; INTRAVENOUS at 11:53

## 2022-09-28 RX ADMIN — Medication 20 MILLIEQUIVALENT(S): at 06:11

## 2022-09-28 RX ADMIN — Medication 2 TABLET(S): at 13:19

## 2022-09-28 RX ADMIN — Medication 2 TABLET(S): at 07:00

## 2022-09-28 RX ADMIN — AMPICILLIN SODIUM AND SULBACTAM SODIUM 200 GRAM(S): 250; 125 INJECTION, POWDER, FOR SUSPENSION INTRAMUSCULAR; INTRAVENOUS at 00:26

## 2022-09-28 RX ADMIN — AMPICILLIN SODIUM AND SULBACTAM SODIUM 200 GRAM(S): 250; 125 INJECTION, POWDER, FOR SUSPENSION INTRAMUSCULAR; INTRAVENOUS at 06:10

## 2022-09-28 RX ADMIN — Medication 400 MILLIGRAM(S): at 07:00

## 2022-09-28 RX ADMIN — Medication 650 MILLIGRAM(S): at 06:11

## 2022-09-28 NOTE — DISCHARGE NOTE PROVIDER - CARE PROVIDER_API CALL
Nina Holman (MD; MD)  [facility]; Cardiovascular Disease; Internal Medicine  [address] [place] [address], [address] [place] [address]  Phone: [phone]  Fax: [phone]  Follow Up Time: 2 weeks    Vitaly Mccormack)  Surgery; Thoracic and Cardiac Surgery  [address] [place] [address]  Phone: [phone]  Fax: [phone]  Follow Up Time: 2 weeks

## 2022-09-28 NOTE — PROGRESS NOTE ADULT - SUBJECTIVE AND OBJECTIVE BOX
GENERAL SURGERY PROGRESS NOTE    Patient: DEMETRIA LAZCANO , 22y ([birth year])Male   MRN: [id number]  Location: [address]  Visit: 09-22-22 Inpatient  Date: 09-28-22 @ 00:44    Procedure/Dx/Injuries: cardiac tamponade, s/p pericardial window    Events of past 24 hours: pericardial drain remains to suction,     PAST MEDICAL & SURGICAL HISTORY:  No pertinent past medical history      No significant past surgical history    Vitals:   T(F): 97.3 (09-27-22 @ 21:00), Max: 97.3 (09-27-22 @ 05:00)  HR: 77 (09-28-22 @ 00:00)  BP: 107/66 (09-28-22 @ 00:00)  RR: 20 (09-28-22 @ 00:00)  SpO2: 97% (09-28-22 @ 00:00)      Diet, Regular:   Consistent Carbohydrate No Snacks      Fluids:     I & O's:    09-26-22 @ 07:01  -  09-27-22 @ 07:00  --------------------------------------------------------  IN:    Insulin: 43 mL    IV PiggyBack: 200 mL    Oral Fluid: 940 mL    sodium chloride 0.9%: 1650 mL    sodium chloride 0.9%: 2000 mL  Total IN: 4833 mL    OUT:    Chest Tube (mL): 150 mL    Indwelling Catheter - Urethral (mL): 2700 mL    Voided (mL): 2850 mL  Total OUT: 5700 mL    Total NET: -867 mL    Bowel Movement: : [] YES [] NO  Flatus: : [] YES [] NO    PHYSICAL EXAM:  General: NAD, AAOx3, calm and cooperative  Cardiac: RRR S1, S2, pericardial drain to suction  Respiratory: bilateral breath sounds   Abdomen: Soft, non-distended, non-tender,   Incision/wound: healing well, dressings in place, clean, dry and intact    MEDICATIONS  (STANDING):  ampicillin/sulbactam  IVPB 3 Gram(s) IV Intermittent every 6 hours  calcium carbonate    500 mG (Tums) Chewable 2 Tablet(s) Chew every 8 hours  calcium gluconate IVPB 2 Gram(s) IV Intermittent once  dextrose 50% Injectable 50 milliLiter(s) IV Push every 15 minutes  enoxaparin Injectable 40 milliGRAM(s) SubCutaneous every 24 hours  ibuprofen  Tablet. 400 milliGRAM(s) Oral every 8 hours  insulin regular Infusion 10 Unit(s)/Hr (10 mL/Hr) IV Continuous <Continuous>  meperidine     Injectable 25 milliGRAM(s) IV Push once  methylPREDNISolone sodium succinate Injectable 60 milliGRAM(s) IV Push every 12 hours  metoprolol tartrate 12.5 milliGRAM(s) Oral every 12 hours  pantoprazole    Tablet 40 milliGRAM(s) Oral before breakfast  potassium chloride    Tablet ER 20 milliEquivalent(s) Oral two times a day  sodium bicarbonate 650 milliGRAM(s) Oral every 8 hours  sodium chloride 0.9%. 1000 milliLiter(s) (150 mL/Hr) IV Continuous <Continuous>    MEDICATIONS  (PRN):  acetaminophen     Tablet .. 650 milliGRAM(s) Oral every 6 hours PRN Temp greater or equal to 38C (100.4F), Mild Pain (1 - 3)  aluminum hydroxide/magnesium hydroxide/simethicone Suspension 30 milliLiter(s) Oral every 4 hours PRN Dyspepsia  melatonin 3 milliGRAM(s) Oral at bedtime PRN Insomnia  ondansetron Injectable 4 milliGRAM(s) IV Push every 8 hours PRN Nausea and/or Vomiting  oxyCODONE    IR 5 milliGRAM(s) Oral every 4 hours PRN Moderate Pain (4 - 6)  oxyCODONE    IR 10 milliGRAM(s) Oral every 4 hours PRN Severe Pain (7 - 10)      DVT PROPHYLAXIS: enoxaparin Injectable 40 milliGRAM(s) SubCutaneous every 24 hours    GI PROPHYLAXIS: pantoprazole    Tablet 40 milliGRAM(s) Oral before breakfast    ANTICOAGULATION:   ANTIBIOTICS:  ampicillin/sulbactam  IVPB 3 Gram(s)    LAB/STUDIES:  Labs:  CAPILLARY BLOOD GLUCOSE      POCT Blood Glucose.: 139 mg/dL (27 Sep 2022 23:11)  POCT Blood Glucose.: 128 mg/dL (27 Sep 2022 20:14)  POCT Blood Glucose.: 122 mg/dL (27 Sep 2022 16:10)  POCT Blood Glucose.: 168 mg/dL (27 Sep 2022 14:18)  POCT Blood Glucose.: 107 mg/dL (27 Sep 2022 12:04)  POCT Blood Glucose.: 154 mg/dL (27 Sep 2022 09:14)  POCT Blood Glucose.: 120 mg/dL (27 Sep 2022 04:55)  POCT Blood Glucose.: 135 mg/dL (27 Sep 2022 00:47)                          10.6   6.81  )-----------( 255      ( 27 Sep 2022 05:15 )             30.5         09-27    137  |  103  |  13  ----------------------------<  127<H>  3.5   |  27  |  <0.5<L>      Calcium, Total Serum: 7.6 mg/dL (09-27-22 @ 05:15)      LFTs:     Blood Gas Arterial, Lactate: 1.30 mmol/L (09-26-22 @ 04:09)  Blood Gas Arterial, Lactate: 1.30 mmol/L (09-25-22 @ 06:49)  Blood Gas Arterial, Lactate: 1.40 mmol/L (09-25-22 @ 01:22)    ABG - ( 26 Sep 2022 04:09 )  pH: 7.44  /  pCO2: 36    /  pO2: 181   / HCO3: 24    / Base Excess: 0.5   /  SaO2: x           ABG - ( 25 Sep 2022 06:49 )  pH: 7.44  /  pCO2: 30    /  pO2: 103   / HCO3: 20    / Base Excess: -2.9  /  SaO2: 98.9      ABG - ( 25 Sep 2022 01:22 )  pH: 7.43  /  pCO2: 30    /  pO2: 134   / HCO3: 20    / Base Excess: -3.5  /  SaO2: 99.2        Coags:    CARDIAC MARKERS ( 27 Sep 2022 09:38 )  x     / x     / 8252 U/L / x     / x      CARDIAC MARKERS ( 26 Sep 2022 04:08 )  x     / x     / 31413 U/L / x     / x          Culture - Acid Fast - Body Fluid w/Smear (collected 26 Sep 2022 14:15)  Source: Pericardial pericardial fluid      IMAGING:  < from: Xray Chest 1 View- PORTABLE-Routine (09.27.22 @ 07:51) >    Impression:    Stable appearance of left greater than right pleural effusions.    < end of copied text >      ACCESS/ DEVICES:  [ ] Peripheral IV  [ ] Central Venous Line	[ ] R	[ ] L	[ ] IJ	[ ] Fem	[ ] SC	Placed:   [ ] Arterial Line		[ ] R	[ ] L	[ ] Fem	[ ] Rad	[ ] Ax	Placed:   [ ] PICC:					[ ] Mediport  [ ] Urinary Catheter,  Date Placed:   [ ] Chest tube: [ ] Right, [ ] Left  [ ] ARMEN/Jason Drains

## 2022-09-28 NOTE — DISCHARGE NOTE PROVIDER - NSDCFUADDINST_GEN_ALL_CORE_FT
-Use incentive spirometer every hour for ten times while awake till chest pain free  -Walk at LEAST twice a day for 5-10 minutes  -Do not lift anything over 15 lbs for 2 weeks if you had a VAT's procedure or 4 weeks for a Thoracotomy operation  -Call office for increasing redness, swelling, pain or drainage from incision or puncture sites  -No driving till you can perform the mechanics of driving without hesitation and no longer are on pain medications (narcotics)  -Chest tube dressings may be removed 2 days after the chest tube comes out  -You may shower after dressing removed  -No baths swimming, hot tubs, or baths for 4 weeks  -Call office for any questions or concerns [phone]

## 2022-09-28 NOTE — DISCHARGE NOTE PROVIDER - PROVIDER TOKENS
PROVIDER:[TOKEN:[[id number]],FOLLOWUP:[2 weeks]],PROVIDER:[TOKEN:[[id number]],FOLLOWUP:[2 weeks]]

## 2022-09-28 NOTE — PROGRESS NOTE ADULT - PROVIDER SPECIALTY LIST ADULT
CT Surgery
CT Surgery
Critical Care
Critical Care
Rheumatology
Thoracic Surgery
Infectious Disease
Nephrology
Nephrology
Thoracic Surgery
Thoracic Surgery
CT Surgery
Cardiology
Hospitalist
Infectious Disease
Nephrology
Hospitalist
Infectious Disease

## 2022-09-28 NOTE — PROGRESS NOTE ADULT - SUBJECTIVE AND OBJECTIVE BOX
DEMETRIA LAZCANO  22y, Male    All available historical data reviewed    OVERNIGHT EVENTS:  feels well and has no new complaints  No fevers   CT removed    ROS:  General: Denies rigors, nightsweats  HEENT: Denies headache, rhinorrhea, sore throat, eye pain  CV: Denies CP, palpitations  PULM: Denies wheezing, hemoptysis  GI: Denies hematemesis, hematochezia, melena  : Denies discharge, hematuria  MSK: Denies arthralgias, myalgias  SKIN: Denies rash, lesions  NEURO: Denies paresthesias, weakness  PSYCH: Denies depression, anxiety    VITALS:  T(F): 97.5, Max: 98.1 (09-28-22 @ 04:00)  HR: 92  BP: 105/67  RR: 25Vital Signs Last 24 Hrs  T(C): 36.4 (28 Sep 2022 08:00), Max: 36.7 (28 Sep 2022 04:00)  T(F): 97.5 (28 Sep 2022 08:00), Max: 98.1 (28 Sep 2022 04:00)  HR: 92 (28 Sep 2022 11:00) (68 - 100)  BP: 105/67 (28 Sep 2022 10:00) (99/63 - 128/90)  BP(mean): 81 (28 Sep 2022 10:00) (76 - 104)  RR: 25 (28 Sep 2022 11:00) (9 - 30)  SpO2: 96% (28 Sep 2022 11:00) (94% - 98%)    Parameters below as of 28 Sep 2022 10:00  Patient On (Oxygen Delivery Method): room air        TESTS & MEASUREMENTS:                        11.9   9.01  )-----------( 316      ( 28 Sep 2022 03:00 )             34.2     09-28    139  |  102  |  14  ----------------------------<  108<H>  3.7   |  24  |  0.5<L>    Ca    7.6<L>      28 Sep 2022 05:50  Mg     2.5     09-28    TPro  5.4<L>  /  Alb  3.3<L>  /  TBili  0.3  /  DBili  x   /  AST  225<H>  /  ALT  277<H>  /  AlkPhos  67  09-28    LIVER FUNCTIONS - ( 28 Sep 2022 05:50 )  Alb: 3.3 g/dL / Pro: 5.4 g/dL / ALK PHOS: 67 U/L / ALT: 277 U/L / AST: 225 U/L / GGT: x             Culture - Acid Fast - Body Fluid w/Smear (collected 09-26-22 @ 14:15)  Source: Pericardial pericardial fluid    Culture - Fungal, Body Fluid (collected 09-24-22 @ 19:00)  Source: .Body Fluid None  Preliminary Report (09-26-22 @ 07:28):    Testing in progress    Culture - Acid Fast - Body Fluid w/Smear (collected 09-24-22 @ 19:00)  Source: .Body Fluid None    Culture - Body Fluid with Gram Stain (collected 09-24-22 @ 19:00)  Source: .Body Fluid None  Gram Stain (09-25-22 @ 04:32):    No polymorphonuclear leukocytes seen    No organisms seen    by cytocentrifuge  Preliminary Report (09-25-22 @ 17:56):    No growth    Culture - Fungal, Tissue (collected 09-24-22 @ 17:30)  Source: .Tissue None  Preliminary Report (09-26-22 @ 07:27):    Testing in progress    Culture - Acid Fast - Tissue w/Smear (collected 09-24-22 @ 17:30)  Source: .Tissue None    Culture - Tissue with Gram Stain (collected 09-24-22 @ 17:30)  Source: .Tissue None  Gram Stain (09-25-22 @ 04:31):    No polymorphonuclear leukocytes seen per low power field    No organisms seen per oil power field  Preliminary Report (09-25-22 @ 21:31):    No growth    Culture - Urine (collected 09-22-22 @ 03:15)  Source: Clean Catch Clean Catch (Midstream)  Final Report (09-23-22 @ 14:50):    No growth    Culture - Blood (collected 09-21-22 @ 21:10)  Source: .Blood Blood-Peripheral  Final Report (09-27-22 @ 02:00):    No Growth Final    Culture - Blood (collected 09-21-22 @ 21:10)  Source: .Blood Blood-Peripheral  Final Report (09-27-22 @ 02:00):    No Growth Final            RADIOLOGY & ADDITIONAL TESTS:  Personal review of radiological diagnostics performed  Echo and EKG results noted when applicable.     MEDICATIONS:  acetaminophen     Tablet .. 650 milliGRAM(s) Oral every 6 hours PRN  aluminum hydroxide/magnesium hydroxide/simethicone Suspension 30 milliLiter(s) Oral every 4 hours PRN  ampicillin/sulbactam  IVPB 3 Gram(s) IV Intermittent every 6 hours  calcium carbonate    500 mG (Tums) Chewable 2 Tablet(s) Chew every 8 hours  calcium gluconate IVPB 2 Gram(s) IV Intermittent once  dextrose 50% Injectable 50 milliLiter(s) IV Push every 15 minutes  enoxaparin Injectable 40 milliGRAM(s) SubCutaneous every 24 hours  insulin regular Infusion 10 Unit(s)/Hr IV Continuous <Continuous>  melatonin 3 milliGRAM(s) Oral at bedtime PRN  meperidine     Injectable 25 milliGRAM(s) IV Push once  methylPREDNISolone sodium succinate Injectable 60 milliGRAM(s) IV Push every 12 hours  metoprolol tartrate 12.5 milliGRAM(s) Oral every 12 hours  ondansetron Injectable 4 milliGRAM(s) IV Push every 8 hours PRN  oxyCODONE    IR 5 milliGRAM(s) Oral every 4 hours PRN  oxyCODONE    IR 10 milliGRAM(s) Oral every 4 hours PRN  pantoprazole    Tablet 40 milliGRAM(s) Oral before breakfast  potassium chloride    Tablet ER 20 milliEquivalent(s) Oral two times a day  potassium chloride  20 mEq/100 mL IVPB 20 milliEquivalent(s) IV Intermittent once  sodium bicarbonate 650 milliGRAM(s) Oral every 8 hours  sodium chloride 0.9%. 1000 milliLiter(s) IV Continuous <Continuous>      ANTIBIOTICS:  ampicillin/sulbactam  IVPB 3 Gram(s) IV Intermittent every 6 hours

## 2022-09-28 NOTE — PROGRESS NOTE ADULT - ASSESSMENT
23 yo M with no significant PMHx presents to the ED c/o R sided lower dental pain x 1 week with development of fever/diffuse myalgias x 3 days. Pt had gone work on Monday in his usual state of health. When he came home that night he had begun complaining of aches and feeling unwell. He was found to have right jaw soft tissue infection, in severe sepsis and was started on abx.  # rhabdomyolysis/ ? polymyositis  / soft tissue infection / pericardial tamponade sp pericardial window  Creatine Kinase trending down   UO noted   maintain - 250 cc/h   d/c sodium bicarbonate po  autoimmune w/up negative so far / on steroids/ followed by rheumatology    will sign off recall as needed

## 2022-09-28 NOTE — DISCHARGE NOTE PROVIDER - NSFOLLOWUPCLINICS_GEN_ALL_ED_FT
[facility]  Medicine  [address], [place]   Phone: [phone]  Fax:   Follow Up Time: 2 weeks

## 2022-09-28 NOTE — DISCHARGE NOTE PROVIDER - NSDCCPTREATMENT_GEN_ALL_CORE_FT
PRINCIPAL PROCEDURE  Procedure: Creation, pericardial window, open  Findings and Treatment: Left Thoracotomy

## 2022-09-28 NOTE — DISCHARGE NOTE PROVIDER - NSDCCPGOAL_GEN_ALL_CORE_FT
What Is The Reason For Today's Visit?: History of Dysplastic Nevi
To get better and follow your care plan as instructed.

## 2022-09-28 NOTE — DISCHARGE NOTE NURSING/CASE MANAGEMENT/SOCIAL WORK - NSDCPEFALRISK_GEN_ALL_CORE
For information on Fall & Injury Prevention, visit: [email or web address] OR  [email or web address] OR  https://www.cdc.gov/steadi/patient.html

## 2022-09-28 NOTE — CONSULT NOTE ADULT - CONSULT REASON
Pericardial effusion
Rhabdomyolysis
facial swelling
transaminitis
sepsis
Pericardial effusion
R fascial cellulitis
Requested
Large Pericardial effusion
Elevated CPK concern for myositis
sepsis
Heart failure/cardiomyopathy

## 2022-09-28 NOTE — DISCHARGE NOTE PROVIDER - NSDCMRMEDTOKEN_GEN_ALL_CORE_FT
acetaminophen 325 mg oral tablet: 2 tab(s) orally every 6 hours, As needed, Temp greater or equal to 38C (100.4F), Mild Pain (1 - 3)  metoprolol tartrate 25 mg oral tablet: 0.5 tab(s) orally every 12 hours    acetaminophen 325 mg oral tablet: 2 tab(s) orally every 6 hours, As needed, Temp greater or equal to 38C (100.4F), Mild Pain (1 - 3)  Metoprolol Succinate ER 25 mg oral tablet, extended release: 1 tab(s) orally once a day

## 2022-09-28 NOTE — PROGRESS NOTE ADULT - ASSESSMENT
Rhabdomyolysis, myopericarditis, cardiac tamponade s/p pericardial window, submandibular inflammation/infection    -Uncertain etiology of patients clinical presentation. He has laboratory findings and symptoms of muscle pain concerning for rhabdomyolysis (elevated CPK). He has no muscle weakness on physical exam that is usually seen with inflammatory myositis. Unclear where his fevers are from either possible submandibular infection? He was on Unasyn for this no off antibiotics. Pericardial and pleural effusions can be seen in autoimmune disease like SLE, myositis, vasculitis, sarcoidosis, RA, Sjogrens, scleroderma, Still's disease, MCTD, Behcets, but has no other symptoms of these conditions (skin rashes, joint pains, sicca symptoms, mucositis, muscle weakness, etc.)  -His TTE showed cardiac tamponade with EF 20% repeat TTE EF 35-40% s/p pericardial window 9/24 --> fluid and tissue cultures negative  -Rhabdomyolysis improved   -Autoimmune labs negative  -Stop steroids

## 2022-09-28 NOTE — DISCHARGE NOTE PROVIDER - NSDCCPCAREPLAN_GEN_ALL_CORE_FT
PRINCIPAL DISCHARGE DIAGNOSIS  Diagnosis: Pericardial effusion  Assessment and Plan of Treatment:       SECONDARY DISCHARGE DIAGNOSES  Diagnosis: Submandibular lymphadenopathy  Assessment and Plan of Treatment:

## 2022-09-28 NOTE — PROGRESS NOTE ADULT - ASSESSMENT
PROBLEMS  #Resolved sepsis (  Presntly no ongoing infectious focus  CT removed  CXR no focal consolidation  All cultures NGTD  9/21 BCx NG  Tissue cultures NG  Pleural fluid cultures NG  WBC 9.0  Hep A, B, C serologies negative  EBV PCR negative  Toxo IgM negative    RECOMMENDATIONS;  D/c unasyn  no ABx  Please do not hesitate to recall ID if any questions arise either through [employer] or through [employer]

## 2022-09-28 NOTE — DISCHARGE NOTE PROVIDER - HOSPITAL COURSE
21 yo M with no significant PMHx presents to the ED c/o R sided lower dental pain x 1 week with development of fever/diffuse myalgias x 3 days. Pt had gone work on Monday in his usual state of health. When he came home that night he had begun complaining of aches and feeling unwell. Over the past few days, his dental pain has gotten worse. He also endorses having fever as high as 103. He reports the pain has made it difficult for him to walk; he also became nauseous when eating and he hasn't had anything but water for past two days. He denies any headaches, sob, chest pain, abd pain, diarrhea, constipation. Pt has no recent dental work, no trauma, no known sick contacts, no pets or animal contact. Pt's father reports he has not had any vaccinations.     Pt went to urgent care the night before presentation where he tested negative for covid and was given tylenol. He was told to come to the ED. In the ED, he was given a bolus of fluids, tylenol, and zosyn. CT showed enlarged right submandibular and right anterior upper jugular lymph nodes and associated adjacent soft tissue changes without drainable fluid collection.  (22 Sep 2022 08:22)  Pt admitted for fever, right jaw pain, elevated CPK getting worked up for myositis vs inflammatory/autoimmune conditions and possible vasculitis. ECHO showed large pericardial effusion in which pericardial window with drain place was done. Drain removed on POD#4. Patient rheumatological work up was negative. 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 His EF was 20% prior to drainage, repeat echo showed his EF improved to 40%. Dr Watson from Heart Failure was consulted. Pt clear to be discharged with medical management as an outpatient. Patient discharged home in stable condition on POD#4 with instruction to follow up with DR Watson in 2 weeks.

## 2022-09-28 NOTE — CONSULT NOTE ADULT - REASON FOR ADMISSION
fever, facial swelling
R fascial cellulitis
fever, facial swelling
fever, facial swelling

## 2022-09-28 NOTE — PROGRESS NOTE ADULT - ASSESSMENT
ASSESSMENT:  22y M w/ cardiac tamponade, s/p pericardial window    PLAN:  pericardial drain to suction, monitor output  daily AM cxr   pain control     spectra [id number]

## 2022-09-28 NOTE — CONSULT NOTE ADULT - CONSULT REQUESTED BY NAME
Dr Rodriguez
Dr jules
SICU
medicine
primary team
Medicine
Medicine
CTU team
Dr Caicedo
Dr. Mclaughlin
team
Dr. Tariq

## 2022-09-28 NOTE — PROGRESS NOTE ADULT - REASON FOR ADMISSION
fever, facial swelling
pericardial effusion and cardiac tamponade
fever, facial swelling

## 2022-09-28 NOTE — DISCHARGE NOTE NURSING/CASE MANAGEMENT/SOCIAL WORK - PATIENT PORTAL LINK FT
You can access the FollowMyHealth Patient Portal offered by [facility] by registering at the following website: [email or web address]. By joining [facility]’s FollowMyHealth portal, you will also be able to view your health information using other applications (apps) compatible with our system. Risks/benefits discussed with patient/surrogate

## 2022-09-28 NOTE — PROGRESS NOTE ADULT - SUBJECTIVE AND OBJECTIVE BOX
Esthela #[id number] used     Rheumatology progress    Patient is a 22y old  Male who presents with a chief complaint of fever, facial swelling (25 Sep 2022 07:51)      HPI:  21 yo M with no significant PMHx presents to the ED c/o R sided lower dental pain x 1 week with development of fever/diffuse myalgias x 3 days. Pt had gone work on Monday in his usual state of health. When he came home that night he had begun complaining of aches and feeling unwell. Over the past few days, his dental pain has gotten worse. He also endorses having fever as high as 103. He reports the pain has made it difficult for him to walk; he also became nauseous when eating and he hasn't had anything but water for past two days. He denies any headaches, sob, chest pain, abd pain, diarrhea, constipation. Pt has no recent dental work, no trauma, no known sick contacts, no pets or animal contact. Pt's father reports he has not had any vaccinations.     Pt went to urgent care last night where he tested negative for covid and was given tylenol. He was told to come to the ED. In the ED, he was given a bolus of fluids, tylenol, and zosyn. CT showed enlarged right submandibular and right anterior upper jugular lymph nodes and associated adjacent soft tissue changes without drainable fluid collection.  (22 Sep 2022 08:22)    Today's history:  He reports no leg pains, chest pain, palpitations, or dyspnea. He has been up walking without any issues. Overall feeling well without acute complaints.        ROS:  Negative except for:    PAST MEDICAL & SURGICAL HISTORY:  No pertinent past medical history      No significant past surgical history          SOCIAL HISTORY:    FAMILY HISTORY:      MEDICATIONS  (STANDING):  ampicillin/sulbactam  IVPB 3 Gram(s) IV Intermittent every 6 hours  calcium carbonate    500 mG (Tums) Chewable 2 Tablet(s) Chew every 8 hours  calcium gluconate IVPB 2 Gram(s) IV Intermittent once  ceFAZolin   IVPB 1000 milliGRAM(s) IV Intermittent every 8 hours  dextrose 50% Injectable 50 milliLiter(s) IV Push every 15 minutes  enoxaparin Injectable 40 milliGRAM(s) SubCutaneous every 24 hours  ibuprofen  Tablet. 400 milliGRAM(s) Oral every 8 hours  insulin regular Infusion 10 Unit(s)/Hr (10 mL/Hr) IV Continuous <Continuous>  meperidine     Injectable 25 milliGRAM(s) IV Push once  methylPREDNISolone sodium succinate Injectable 60 milliGRAM(s) IV Push every 12 hours  metoprolol tartrate 12.5 milliGRAM(s) Oral every 12 hours  pantoprazole    Tablet 40 milliGRAM(s) Oral before breakfast  sodium bicarbonate 650 milliGRAM(s) Oral every 8 hours  sodium chloride 0.9%. 1000 milliLiter(s) (200 mL/Hr) IV Continuous <Continuous>    MEDICATIONS  (PRN):  acetaminophen     Tablet .. 650 milliGRAM(s) Oral every 6 hours PRN Temp greater or equal to 38C (100.4F), Mild Pain (1 - 3)  aluminum hydroxide/magnesium hydroxide/simethicone Suspension 30 milliLiter(s) Oral every 4 hours PRN Dyspepsia  HYDROmorphone  Injectable 0.5 milliGRAM(s) IV Push every 6 hours PRN Breakthrough Pain  melatonin 3 milliGRAM(s) Oral at bedtime PRN Insomnia  ondansetron Injectable 4 milliGRAM(s) IV Push every 8 hours PRN Nausea and/or Vomiting  oxyCODONE    IR 5 milliGRAM(s) Oral every 4 hours PRN Moderate Pain (4 - 6)  oxyCODONE    IR 10 milliGRAM(s) Oral every 4 hours PRN Severe Pain (7 - 10)      Allergies    No Known Allergies    Intolerances        Vital Signs Last 24 Hrs  T(C): 35.8 (25 Sep 2022 08:00), Max: 38.2 (24 Sep 2022 13:00)  T(F): 96.5 (25 Sep 2022 08:00), Max: 100.8 (24 Sep 2022 13:00)  HR: 118 (25 Sep 2022 09:00) (79 - 138)  BP: 111/69 (25 Sep 2022 09:00) (87/54 - 128/55)  BP(mean): 86 (25 Sep 2022 09:00) (64 - 98)  RR: 32 (25 Sep 2022 09:00) (17 - 39)  SpO2: 95% (25 Sep 2022 09:00) (95% - 100%)    Parameters below as of 25 Sep 2022 09:00  Patient On (Oxygen Delivery Method): nasal cannula  O2 Flow (L/min): 3      PHYSICAL EXAM  General: adult in NAD  HEENT: clear oropharynx, anicteric sclera, pink conjunctiva  Neck: supple  CV: normal S1/S2 with no murmur rubs or gallops  Lungs: positive air movement b/l ant lungs,clear to auscultation, no wheezes, no rales  Abdomen: soft non-tender non-distended, no hepatosplenomegaly  Ext: no clubbing cyanosis or edema  Musculoskeletal: No joint swelling or joint tenderness in hands or feet. Full range of motion in legs. No tenderness to palpation bilateral lower extremities  Skin: no rashes and no petechiae, no shawl signs, gottron papules, heliotrope rash, periugual erytheam  Neuro: alert and oriented X 4, no focal deficits. 5/5 muscle strength in bilateral proximal lower extremities hip flexion, distally knee flexion and extension, toe extension      09-24-22 @ 07:01  -  09-25-22 @ 07:00  --------------------------------------------------------  IN: 4964.8 mL / OUT: 3650 mL / NET: 1314.8 mL    09-25-22 @ 07:01  -  09-25-22 @ 12:26  --------------------------------------------------------  IN: 704 mL / OUT: 555 mL / NET: 149 mL      LABS:                          11.9   7.60  )-----------( 153      ( 25 Sep 2022 05:40 )             33.5         Mean Cell Volume : 78.5 fL  Mean Cell Hemoglobin : 27.9 pg  Mean Cell Hemoglobin Concentration : 35.5 g/dL  Auto Neutrophil # : 6.10 K/uL  Auto Lymphocyte # : 1.24 K/uL  Auto Monocyte # : 0.21 K/uL  Auto Eosinophil # : 0.00 K/uL  Auto Basophil # : 0.01 K/uL  Auto Neutrophil % : 80.3 %  Auto Lymphocyte % : 16.3 %  Auto Monocyte % : 2.8 %  Auto Eosinophil % : 0.0 %  Auto Basophil % : 0.1 %      09-25    136  |  107  |  15  ----------------------------<  130<H>  3.7   |  18  |  0.5<L>    Ca    7.4<L>      25 Sep 2022 05:40  Phos  2.7     09-25  Mg     2.5     09-25    TPro  4.8<L>  /  Alb  2.8<L>  /  TBili  0.3  /  DBili  x   /  AST  391<H>  /  ALT  238<H>  /  AlkPhos  58  09-25      PT/INR - ( 25 Sep 2022 02:10 )   PT: 13.40 sec;   INR: 1.17 ratio         PTT - ( 25 Sep 2022 02:10 )  PTT:28.6 sec    Ferritin, Serum: 1084 ng/mL (09-24 @ 11:34)  Reticulocyte Percent: 0.9 % (09-23 @ 17:59)  Iron - Total Binding Capacity.: 204 ug/dL (09-23 @ 17:59)  Ferritin, Serum: 758 ng/mL (09-23 @ 17:59)              BLOOD SMEAR INTERPRETATION:       RADIOLOGY & ADDITIONAL STUDIES:

## 2022-09-28 NOTE — CONSULT NOTE ADULT - ASSESSMENT
Assessment: 23 yo M with no significant PMHx presents to the ED c/o R sided lower dental pain x 1 week with development of fever/diffuse myalgias x 3 days. He was found to have right jaw soft tissue infection, in severe sepsis and was started on abx therapy. TTE on 9/24 showed reduced EF of 30%, moderate-to-large pericardial effusion and echocardiographic signs of tamponade. Patient is now s/p pericardial window on 9/26.    # soft tissue infection  # pericardial tamponade sp pericardial window      Plan:  Patient appears euvolemic on exam  Switch metoprolol tartrate to succinate 25mg daily upon discharge   Get BMP daily   Maintain potassium >4.0, Mg >2.2  Strict intake and output  Daily weight   Consider CMR  - can be done outpatient  Plan discussed with patient, family, and CTU team  Patient to follow up in outpatient heart failure office with NP in 1-2 weeks from discharge

## 2022-09-28 NOTE — CONSULT NOTE ADULT - PROVIDER SPECIALTY LIST ADULT
Nephrology
Critical Care
Critical Care
CT Surgery
Rheumatology
Hepatology
CT Surgery
ENT
Heart Failure
OMFS
CCU
Infectious Disease

## 2022-09-28 NOTE — CONSULT NOTE ADULT - SUBJECTIVE AND OBJECTIVE BOX
Date of Admission: 22    CHIEF COMPLAINT: Patient is a 22y old  Male who presents with a chief complaint of fever, facial swelling (28 Sep 2022 12:18)    HISTORY OF PRESENT ILLNESS: 23 yo M with no significant PMHx presents to the ED c/o R sided lower dental pain x 1 week with development of fever/diffuse myalgias x 3 days. Pt had gone work on Monday in his usual state of health. When he came home that night he had begun complaining of aches and feeling unwell. Over the past few days, his dental pain has gotten worse. He also endorses having fever as high as 103. He reports the pain has made it difficult for him to walk; he also became nauseous when eating and he hasn't had anything but water for past two days. He denies any headaches, sob, chest pain, abd pain, diarrhea, constipation. Pt has no recent dental work, no trauma, no known sick contacts, no pets or animal contact. Pt's father reports he has not had any vaccinations.   Pt went to urgent care last night where he tested negative for covid and was given tylenol. He was told to come to the ED. In the ED, he was given a bolus of fluids, tylenol, and zosyn. CT showed enlarged right submandibular and right anterior upper jugular lymph nodes and associated adjacent soft tissue changes without drainable fluid collection.  (22 Sep 2022 08:22)    Patient denies any previous cardiac conditions in past. Denies any previous hospitalizations. Ambulatory without issue.    PAST MEDICAL & SURGICAL HISTORY:  No pertinent past medical history      No significant past surgical history        FAMILY HISTORY: No pertinent family history of premature cardiovascular disease in first degree relatives    SOCIAL HISTORY:  Denies smoking, alcohol, or drug use    Allergies  No Known Allergies    Intolerances    	    REVIEW OF SYSTEMS:  CONSTITUTIONAL: No fever, weight loss, or fatigue.  CARDIOLOGY: Patient denies chest pain, shortness of breath or syncopal episodes.   RESPIRATORY: denies shortness of breath, wheezing.   NEUROLOGICAL: No weakness, no focal deficits to report.  ENDOCRINOLOGICAL: no recent change in diabetic medications.   GI: no BRBPR, no n/v, diarrhea.    PSYCHIATRY: normal mood and affect  HEENT: no nasal discharge, no ecchymosis  SKIN: no ecchymosis, no breakdown  MUSCULOSKELETAL: Full range of motion x4.     PHYSICAL EXAM:  T(C): 36.2 (22 @ 12:00), Max: 36.7 (22 @ 04:00)  HR: 91 (22 @ 12:00) (68 - 100)  BP: 103/65 (22 @ 12:00) (99/63 - 128/90)  RR: 22 (22 @ 12:00) (9 - 30)  SpO2: 95% (22 @ 12:00) (94% - 98%)  Wt(kg): --  I&O's Summary    27 Sep 2022 07:  -  28 Sep 2022 07:00  --------------------------------------------------------  IN: 4788 mL / OUT: 5160 mL / NET: -372 mL    28 Sep 2022 07:01  -  28 Sep 2022 14:14  --------------------------------------------------------  IN: 240 mL / OUT: 300 mL / NET: -60 mL        General Appearance: well appearing, normal for age and gender. 	  Neck: normal JVP, no bruit.   Eyes: Extra Ocular muscles intact.   Cardiovascular: regular rate and rhythm S1 S2, No JVD, No murmurs, No edema  Respiratory: Lungs clear to auscultation	  Psychiatry: Alert and oriented x 3, Mood & affect appropriate  Gastrointestinal:  Soft, Non-tender  Skin/Integumen: No rashes, No ecchymoses, No cyanosis	  Neurologic: Non-focal  Musculoskeletal/ extremities: Normal range of motion, No clubbing, cyanosis or edema  Vascular: Peripheral pulses palpable 2+ bilaterally    LABS:	 	                          11.9   9.01  )-----------( 316      ( 28 Sep 2022 03:00 )             34.2         139  |  102  |  14  ----------------------------<  108<H>  3.7   |  24  |  0.5<L>    Ca    7.6<L>      28 Sep 2022 05:50  Mg     2.5         TPro  5.4<L>  /  Alb  3.3<L>  /  TBili  0.3  /  DBili  x   /  AST  225<H>  /  ALT  277<H>  /  AlkPhos  67      CARDIAC MARKERS ( 28 Sep 2022 07:34 )  x     / x     / 4848 U/L / x     / x      CARDIAC MARKERS ( 28 Sep 2022 03:00 )  x     / x     / 5214 U/L / x     / x      CARDIAC MARKERS ( 27 Sep 2022 09:38 )  x     / x     / 8252 U/L / x     / x            TELEMETRY EVENTS: 	    EC22      Ventricular Rate 76 BPM  Atrial Rate 76 BPM  P-R Interval 198 ms  QRS Duration 80 ms  Q-T Interval 410 ms  QTC Calculation(Bazett) 461 ms  P Axis 20 degrees  R Axis 5 degrees  T Axis 70 degrees    Diagnosis Line Normal sinus rhythm  Anterior infarct , age undetermined  Abnormal ECG    Confirmed by CLAUDIA AGUDELO MD ([id number]) on 2022 12:02:13 PM  	  	    PREVIOUS DIAGNOSTIC TESTING:    TTE 22      Summary:   1. Left ventricular ejection fraction, by visual estimation, is 35 to   40%.   2. Moderately decreased global left ventricular systolic function.   3. Mild concentric left ventricular hypertrophy.   4. The left ventricular diastolic function could not be assessed in this   study.   5. Mild mitral regurgitation.   6. Mild tricuspid regurgitation.      TTE 22      Summary:   1. Left ventricular ejection fraction, by visual estimation, is 20%.   2. Severely decreased global left ventricular systolic function.   3. Moderate circumferential pericardial effusion.   4. Right ventricular diastolic collapse.   5. Systolic inversion of the right atrial free wall.   6. Findings are consistent with cardiac tamponade.   7. Findings discussed with Dr. Mclaughlin.    	    Home Medications:    MEDICATIONS  (STANDING):  calcium carbonate    500 mG (Tums) Chewable 2 Tablet(s) Chew every 8 hours  calcium gluconate IVPB 2 Gram(s) IV Intermittent once  dextrose 50% Injectable 50 milliLiter(s) IV Push every 15 minutes  enoxaparin Injectable 40 milliGRAM(s) SubCutaneous every 24 hours  insulin regular Infusion 10 Unit(s)/Hr (10 mL/Hr) IV Continuous <Continuous>  meperidine     Injectable 25 milliGRAM(s) IV Push once  methylPREDNISolone sodium succinate Injectable 60 milliGRAM(s) IV Push every 12 hours  metoprolol tartrate 12.5 milliGRAM(s) Oral every 12 hours  pantoprazole    Tablet 40 milliGRAM(s) Oral before breakfast  potassium chloride    Tablet ER 20 milliEquivalent(s) Oral two times a day  potassium chloride  20 mEq/100 mL IVPB 20 milliEquivalent(s) IV Intermittent once  sodium bicarbonate 650 milliGRAM(s) Oral every 8 hours    MEDICATIONS  (PRN):  acetaminophen     Tablet .. 650 milliGRAM(s) Oral every 6 hours PRN Temp greater or equal to 38C (100.4F), Mild Pain (1 - 3)  aluminum hydroxide/magnesium hydroxide/simethicone Suspension 30 milliLiter(s) Oral every 4 hours PRN Dyspepsia  melatonin 3 milliGRAM(s) Oral at bedtime PRN Insomnia  ondansetron Injectable 4 milliGRAM(s) IV Push every 8 hours PRN Nausea and/or Vomiting  oxyCODONE    IR 5 milliGRAM(s) Oral every 4 hours PRN Moderate Pain (4 - 6)  oxyCODONE    IR 10 milliGRAM(s) Oral every 4 hours PRN Severe Pain (7 - 10)

## 2022-09-28 NOTE — PROGRESS NOTE ADULT - SUBJECTIVE AND OBJECTIVE BOX
seen and examined  no distress   no new complaints         PAST HISTORY  --------------------------------------------------------------------------------  No significant changes to PMH, PSH, FHx, SHx, unless otherwise noted    ALLERGIES & MEDICATIONS  --------------------------------------------------------------------------------  Allergies    No Known Allergies    Intolerances      Standing Inpatient Medications  ampicillin/sulbactam  IVPB 3 Gram(s) IV Intermittent every 6 hours  calcium carbonate    500 mG (Tums) Chewable 2 Tablet(s) Chew every 8 hours  calcium gluconate IVPB 2 Gram(s) IV Intermittent once  dextrose 50% Injectable 50 milliLiter(s) IV Push every 15 minutes  enoxaparin Injectable 40 milliGRAM(s) SubCutaneous every 24 hours  insulin regular Infusion 10 Unit(s)/Hr IV Continuous <Continuous>  meperidine     Injectable 25 milliGRAM(s) IV Push once  methylPREDNISolone sodium succinate Injectable 60 milliGRAM(s) IV Push every 12 hours  metoprolol tartrate 12.5 milliGRAM(s) Oral every 12 hours  pantoprazole    Tablet 40 milliGRAM(s) Oral before breakfast  potassium chloride    Tablet ER 20 milliEquivalent(s) Oral two times a day  potassium chloride  20 mEq/100 mL IVPB 20 milliEquivalent(s) IV Intermittent once  sodium bicarbonate 650 milliGRAM(s) Oral every 8 hours  sodium chloride 0.9%. 1000 milliLiter(s) IV Continuous <Continuous>    PRN Inpatient Medications  acetaminophen     Tablet .. 650 milliGRAM(s) Oral every 6 hours PRN  aluminum hydroxide/magnesium hydroxide/simethicone Suspension 30 milliLiter(s) Oral every 4 hours PRN  melatonin 3 milliGRAM(s) Oral at bedtime PRN  ondansetron Injectable 4 milliGRAM(s) IV Push every 8 hours PRN  oxyCODONE    IR 5 milliGRAM(s) Oral every 4 hours PRN  oxyCODONE    IR 10 milliGRAM(s) Oral every 4 hours FL      VITALS/PHYSICAL EXAM  --------------------------------------------------------------------------------  T(C): 36.1 (09-28-22 @ 06:00), Max: 36.7 (09-28-22 @ 04:00)  HR: 77 (09-28-22 @ 07:00) (69 - 104)  BP: 111/66 (09-28-22 @ 07:00) (93/67 - 128/90)  RR: 24 (09-28-22 @ 07:00) (9 - 30)  SpO2: 97% (09-28-22 @ 07:00) (94% - 98%)  Wt(kg): --    Weight (kg): 75.7 (09-27-22 @ 05:00)      09-27-22 @ 07:01  -  09-28-22 @ 07:00  --------------------------------------------------------  IN: 4788 mL / OUT: 5160 mL / NET: -372 mL      Physical Exam:  	Gen: NAD  	Pulm: CTA B/L  	CV: S1S2; no rub  	Abd:  soft, nontender/nondistended  	: No suprapubic tenderness  	LE:  no edema  	    LABS/STUDIES  --------------------------------------------------------------------------------              11.9   9.01  >-----------<  316      [09-28-22 @ 03:00]              34.2     139  |  102  |  14  ----------------------------<  108      [09-28-22 @ 05:50]  3.7   |  24  |  0.5        Ca     7.6     [09-28-22 @ 05:50]      Mg     2.5     [09-28-22 @ 03:00]    TPro  5.4  /  Alb  3.3  /  TBili  0.3  /  DBili  x   /  AST  225  /  ALT  277  /  AlkPhos  67  [09-28-22 @ 05:50]        CK 5214      [09-28-22 @ 03:00]    Creatinine Trend:  SCr 0.5 [09-28 @ 05:50]  SCr <0.5 [09-27 @ 05:15]  SCr 0.5 [09-26 @ 04:08]  SCr 0.5 [09-25 @ 05:40]  SCr 0.5 [09-25 @ 02:10]    Urinalysis - [09-22-22 @ 03:15]      Color Yellow / Appearance Clear / SG 1.039 / pH 6.5      Gluc 500 mg/dL / Ketone Moderate  / Bili Negative / Urobili 3 mg/dL       Blood Moderate / Protein 30 mg/dL / Leuk Est Negative / Nitrite Negative      RBC 1 / WBC 1 / Hyaline 0 / Gran  / Sq Epi  / Non Sq Epi 2 / Bacteria Negative    Urine Creatinine 104      [09-23-22 @ 19:30]  Urine Protein 85      [09-23-22 @ 19:30]  Urine Sodium 65.0      [09-23-22 @ 19:30]  Urine Urea Nitrogen 1029      [09-23-22 @ 19:30]  Urine Potassium 50      [09-23-22 @ 19:30]  Urine Osmolality 889      [09-23-22 @ 19:30]    Iron 17, TIBC 204, %sat 8      [09-23-22 @ 17:59]  Ferritin 1084      [09-24-22 @ 11:34]  TSH 1.11      [09-24-22 @ 11:34]    HBsAg Nonreact      [09-23-22 @ 05:57]  HCV 0.11, Nonreact      [09-23-22 @ 05:57]  HIV Nonreact      [09-23-22 @ 11:49]    CAROLE: titer Negative, pattern --      [09-25-22 @ 05:40]  dsDNA <12      [09-25-22 @ 05:40]  C3 Complement 122      [09-25-22 @ 05:40]  C4 Complement 23      [09-25-22 @ 05:40]  Rheumatoid Factor <10      [09-25-22 @ 05:40]  ANCA: cANCA Negative, pANCA Negative, atypical ANCA Negative      [09-25-22 @ 05:40]

## 2022-09-29 LAB — T SPIRALIS AB SER-ACNC: NEGATIVE — SIGNIFICANT CHANGE UP

## 2022-09-30 ENCOUNTER — INPATIENT (INPATIENT)
Facility: HOSPITAL | Age: 22
LOS: 4 days | Discharge: HOME | End: 2022-10-05
Attending: HOSPITALIST | Admitting: HOSPITALIST

## 2022-09-30 VITALS
RESPIRATION RATE: 20 BRPM | DIASTOLIC BLOOD PRESSURE: 60 MMHG | OXYGEN SATURATION: 97 % | WEIGHT: 145.06 LBS | TEMPERATURE: 101 F | SYSTOLIC BLOOD PRESSURE: 109 MMHG | HEART RATE: 91 BPM

## 2022-09-30 PROBLEM — Z78.9 OTHER SPECIFIED HEALTH STATUS: Chronic | Status: ACTIVE | Noted: 2022-09-21

## 2022-09-30 LAB
ALBUMIN SERPL ELPH-MCNC: 3.8 G/DL — SIGNIFICANT CHANGE UP (ref 3.5–5.2)
ALP SERPL-CCNC: 80 U/L — SIGNIFICANT CHANGE UP (ref 30–115)
ALT FLD-CCNC: 283 U/L — HIGH (ref 0–41)
ANION GAP SERPL CALC-SCNC: 12 MMOL/L — SIGNIFICANT CHANGE UP (ref 7–14)
APTT BLD: 28.1 SEC — SIGNIFICANT CHANGE UP (ref 27–39.2)
AST SERPL-CCNC: 200 U/L — HIGH (ref 0–41)
BASOPHILS # BLD AUTO: 0 K/UL — SIGNIFICANT CHANGE UP (ref 0–0.2)
BASOPHILS NFR BLD AUTO: 0 % — SIGNIFICANT CHANGE UP (ref 0–1)
BILIRUB SERPL-MCNC: 0.5 MG/DL — SIGNIFICANT CHANGE UP (ref 0.2–1.2)
BUN SERPL-MCNC: 14 MG/DL — SIGNIFICANT CHANGE UP (ref 10–20)
CALCIUM SERPL-MCNC: 8.1 MG/DL — LOW (ref 8.4–10.5)
CHLORIDE SERPL-SCNC: 92 MMOL/L — LOW (ref 98–110)
CO2 SERPL-SCNC: 22 MMOL/L — SIGNIFICANT CHANGE UP (ref 17–32)
CREAT SERPL-MCNC: 0.8 MG/DL — SIGNIFICANT CHANGE UP (ref 0.7–1.5)
EGFR: 129 ML/MIN/1.73M2 — SIGNIFICANT CHANGE UP
EOSINOPHIL # BLD AUTO: 0.24 K/UL — SIGNIFICANT CHANGE UP (ref 0–0.7)
EOSINOPHIL NFR BLD AUTO: 3 % — SIGNIFICANT CHANGE UP (ref 0–8)
GLUCOSE SERPL-MCNC: 109 MG/DL — HIGH (ref 70–99)
HCT VFR BLD CALC: 45.8 % — SIGNIFICANT CHANGE UP (ref 42–52)
HGB BLD-MCNC: 15.6 G/DL — SIGNIFICANT CHANGE UP (ref 14–18)
HMGCR ANTIBODY, IGG RESULT: <3 UNITS — SIGNIFICANT CHANGE UP (ref 0–19)
INR BLD: 1.09 RATIO — SIGNIFICANT CHANGE UP (ref 0.65–1.3)
LG PLATELETS BLD QL AUTO: SLIGHT — SIGNIFICANT CHANGE UP
LYMPHOCYTES # BLD AUTO: 1.54 K/UL — SIGNIFICANT CHANGE UP (ref 1.2–3.4)
LYMPHOCYTES # BLD AUTO: 19 % — LOW (ref 20.5–51.1)
MANUAL SMEAR VERIFICATION: SIGNIFICANT CHANGE UP
MCHC RBC-ENTMCNC: 27.5 PG — SIGNIFICANT CHANGE UP (ref 27–31)
MCHC RBC-ENTMCNC: 34.1 G/DL — SIGNIFICANT CHANGE UP (ref 32–37)
MCV RBC AUTO: 80.6 FL — SIGNIFICANT CHANGE UP (ref 80–94)
MONOCYTES # BLD AUTO: 0.24 K/UL — SIGNIFICANT CHANGE UP (ref 0.1–0.6)
MONOCYTES NFR BLD AUTO: 3 % — SIGNIFICANT CHANGE UP (ref 1.7–9.3)
MYELOCYTES NFR BLD: 1 % — HIGH (ref 0–0)
NEUTROPHILS # BLD AUTO: 5.99 K/UL — SIGNIFICANT CHANGE UP (ref 1.4–6.5)
NEUTROPHILS NFR BLD AUTO: 74 % — SIGNIFICANT CHANGE UP (ref 42.2–75.2)
NRBC # BLD: 0 /100 — SIGNIFICANT CHANGE UP (ref 0–0)
NRBC # BLD: SIGNIFICANT CHANGE UP /100 WBCS (ref 0–0)
NT-PROBNP SERPL-SCNC: 1433 PG/ML — HIGH (ref 0–300)
PLAT MORPH BLD: NORMAL — SIGNIFICANT CHANGE UP
PLATELET # BLD AUTO: 302 K/UL — SIGNIFICANT CHANGE UP (ref 130–400)
POTASSIUM SERPL-MCNC: 5.3 MMOL/L — HIGH (ref 3.5–5)
POTASSIUM SERPL-SCNC: 5.3 MMOL/L — HIGH (ref 3.5–5)
PROT SERPL-MCNC: 6.4 G/DL — SIGNIFICANT CHANGE UP (ref 6–8)
PROTHROM AB SERPL-ACNC: 12.5 SEC — SIGNIFICANT CHANGE UP (ref 9.95–12.87)
RBC # BLD: 5.68 M/UL — SIGNIFICANT CHANGE UP (ref 4.7–6.1)
RBC # FLD: 15.2 % — HIGH (ref 11.5–14.5)
RBC BLD AUTO: NORMAL — SIGNIFICANT CHANGE UP
SODIUM SERPL-SCNC: 126 MMOL/L — LOW (ref 135–146)
TROPONIN T SERPL-MCNC: 0.16 NG/ML — CRITICAL HIGH
WBC # BLD: 8.1 K/UL — SIGNIFICANT CHANGE UP (ref 4.8–10.8)
WBC # FLD AUTO: 8.1 K/UL — SIGNIFICANT CHANGE UP (ref 4.8–10.8)

## 2022-09-30 PROCEDURE — 93010 ELECTROCARDIOGRAM REPORT: CPT | Mod: 76

## 2022-09-30 PROCEDURE — 99285 EMERGENCY DEPT VISIT HI MDM: CPT

## 2022-09-30 PROCEDURE — 71045 X-RAY EXAM CHEST 1 VIEW: CPT | Mod: 26

## 2022-09-30 RX ORDER — VANCOMYCIN HCL 1 G
1000 VIAL (EA) INTRAVENOUS ONCE
Refills: 0 | Status: COMPLETED | OUTPATIENT
Start: 2022-09-30 | End: 2022-09-30

## 2022-09-30 RX ORDER — ACETAMINOPHEN 500 MG
975 TABLET ORAL ONCE
Refills: 0 | Status: COMPLETED | OUTPATIENT
Start: 2022-09-30 | End: 2022-09-30

## 2022-09-30 RX ORDER — SODIUM CHLORIDE 9 MG/ML
500 INJECTION, SOLUTION INTRAVENOUS ONCE
Refills: 0 | Status: COMPLETED | OUTPATIENT
Start: 2022-09-30 | End: 2022-09-30

## 2022-09-30 RX ADMIN — SODIUM CHLORIDE 500 MILLILITER(S): 9 INJECTION, SOLUTION INTRAVENOUS at 22:33

## 2022-09-30 RX ADMIN — Medication 250 MILLIGRAM(S): at 22:55

## 2022-09-30 RX ADMIN — Medication 975 MILLIGRAM(S): at 22:33

## 2022-09-30 NOTE — ED ADULT TRIAGE NOTE - CHIEF COMPLAINT QUOTE
pt who was DC 2 days ago post-op pericardial window surgery/ 1 week ago presents to ED with reports of fever and tingling in B/L LE that began last night. pt reports feeling well after surgery but symptoms began suddenly last night. dressing C/D/I upon arrival. pt denying and CP or difficulty breathing

## 2022-09-30 NOTE — ED ADULT NURSE REASSESSMENT NOTE - NS ED NURSE REASSESS COMMENT FT1
pt alert and oriented x4, pt has family member at bedside. pt denies chest pain or SOB. no acute distress noted in pt at this time. will continue to monitor.

## 2022-09-30 NOTE — ED PROVIDER NOTE - PROGRESS NOTE DETAILS
Spoke with cardiology fellow regarding patient's patient presentation.  Patient's troponin is 0.16, which is downtrending from his previous values.  Fellow states that there is no acute intervention. Endorsed to DAVID Rubalcava

## 2022-09-30 NOTE — ED PROVIDER NOTE - NS ED ROS FT
Review of Systems    Constitutional: As per HPI  Cardiovascular: (-) chest pain, (-) syncope  Respiratory: (-) cough, (-) shortness of breath  Gastrointestinal: (-) vomiting, (-) diarrhea, (-) abdominal pain  Musculoskeletal: (-) neck pain, (-) back pain, (-) joint pain  Integumentary: (-) rash, (-) edema  Neurological: (-) headache, (-) altered mental status    Except as documented in the HPI, all other systems are negative.

## 2022-09-30 NOTE — ED PROVIDER NOTE - OBJECTIVE STATEMENT
22-year-old male with no significant past medical history, presents with complaints of fever for several hours.  Patient's was recently admitted and discharged 2 days prior.  Patient was admitted for a facial infection and during his hospital course he developed sepsis, rhabdomyolysis, and myopericarditis leading to pericardial tamponade.  In addition patient had transaminitis.  Patient required a pericardial window, he did well and was eventually discharged.  Has follow-up with cardiology team in 2 weeks, states that he was feeling well until several hours prior to arrival.  Denies coughing, chest pain, shortness of breath, rhinorrhea, sore throat, abdominal flank pain, nausea, vomiting, diarrhea, dysuria.  Patient is speaking only and interpretation was done using [facility] #[id number]

## 2022-09-30 NOTE — ED PROVIDER NOTE - CARE PLAN
1 Principal Discharge DX:	Fever  Secondary Diagnosis:	Hyponatremia  Secondary Diagnosis:	Cardiac enzymes elevated

## 2022-09-30 NOTE — ED ADULT NURSE NOTE - NSIMPLEMENTINTERV_GEN_ALL_ED
Implemented All Fall with Harm Risk Interventions:  [place] to call system. Call bell, personal items and telephone within reach. Instruct patient to call for assistance. Room bathroom lighting operational. Non-slip footwear when patient is off stretcher. Physically safe environment: no spills, clutter or unnecessary equipment. Stretcher in lowest position, wheels locked, appropriate side rails in place. Provide visual cue, wrist band, yellow gown, etc. Monitor gait and stability. Monitor for mental status changes and reorient to person, place, and time. Review medications for side effects contributing to fall risk. Reinforce activity limits and safety measures with patient and family. Provide visual clues: red socks.

## 2022-09-30 NOTE — ED PROVIDER NOTE - CLINICAL SUMMARY MEDICAL DECISION MAKING FREE TEXT BOX
Patient presented with fever.  Patient is status post pericardial window 4 days ago for pericardial tamponade.  Required EKG, labs, imaging, monitor.  No source of fever identified.  Will admit for further management.

## 2022-10-01 LAB
ALBUMIN SERPL ELPH-MCNC: 3.3 G/DL — LOW (ref 3.5–5.2)
ALP SERPL-CCNC: 74 U/L — SIGNIFICANT CHANGE UP (ref 30–115)
ALT FLD-CCNC: 231 U/L — HIGH (ref 0–41)
ANION GAP SERPL CALC-SCNC: 11 MMOL/L — SIGNIFICANT CHANGE UP (ref 7–14)
APPEARANCE UR: CLEAR — SIGNIFICANT CHANGE UP
AST SERPL-CCNC: 150 U/L — HIGH (ref 0–41)
BACTERIA # UR AUTO: NEGATIVE — SIGNIFICANT CHANGE UP
BILIRUB SERPL-MCNC: 0.4 MG/DL — SIGNIFICANT CHANGE UP (ref 0.2–1.2)
BILIRUB UR-MCNC: NEGATIVE — SIGNIFICANT CHANGE UP
BUN SERPL-MCNC: 16 MG/DL — SIGNIFICANT CHANGE UP (ref 10–20)
CALCIUM SERPL-MCNC: 8.1 MG/DL — LOW (ref 8.4–10.5)
CHLORIDE SERPL-SCNC: 97 MMOL/L — LOW (ref 98–110)
CK SERPL-CCNC: 6457 U/L — HIGH (ref 0–225)
CO2 SERPL-SCNC: 23 MMOL/L — SIGNIFICANT CHANGE UP (ref 17–32)
COLOR SPEC: SIGNIFICANT CHANGE UP
CREAT SERPL-MCNC: 0.6 MG/DL — LOW (ref 0.7–1.5)
CRP SERPL-MCNC: 40.9 MG/L — HIGH
DIFF PNL FLD: ABNORMAL
EGFR: 141 ML/MIN/1.73M2 — SIGNIFICANT CHANGE UP
EPI CELLS # UR: 0 /HPF — SIGNIFICANT CHANGE UP (ref 0–5)
ERYTHROCYTE [SEDIMENTATION RATE] IN BLOOD: 42 MM/HR — HIGH (ref 0–10)
FLUAV AG NPH QL: SIGNIFICANT CHANGE UP
FLUBV AG NPH QL: SIGNIFICANT CHANGE UP
GLUCOSE SERPL-MCNC: 98 MG/DL — SIGNIFICANT CHANGE UP (ref 70–99)
GLUCOSE UR QL: SIGNIFICANT CHANGE UP
HCT VFR BLD CALC: 42.6 % — SIGNIFICANT CHANGE UP (ref 42–52)
HGB BLD-MCNC: 14.4 G/DL — SIGNIFICANT CHANGE UP (ref 14–18)
HYALINE CASTS # UR AUTO: 1 /LPF — SIGNIFICANT CHANGE UP (ref 0–7)
KETONES UR-MCNC: NEGATIVE — SIGNIFICANT CHANGE UP
LEUKOCYTE ESTERASE UR-ACNC: NEGATIVE — SIGNIFICANT CHANGE UP
MAGNESIUM SERPL-MCNC: 2.3 MG/DL — SIGNIFICANT CHANGE UP (ref 1.8–2.4)
MCHC RBC-ENTMCNC: 27.4 PG — SIGNIFICANT CHANGE UP (ref 27–31)
MCHC RBC-ENTMCNC: 33.8 G/DL — SIGNIFICANT CHANGE UP (ref 32–37)
MCV RBC AUTO: 81.1 FL — SIGNIFICANT CHANGE UP (ref 80–94)
MRSA PCR RESULT.: NEGATIVE — SIGNIFICANT CHANGE UP
NITRITE UR-MCNC: NEGATIVE — SIGNIFICANT CHANGE UP
NRBC # BLD: 0 /100 WBCS — SIGNIFICANT CHANGE UP (ref 0–0)
PH UR: 7 — SIGNIFICANT CHANGE UP (ref 5–8)
PLATELET # BLD AUTO: 254 K/UL — SIGNIFICANT CHANGE UP (ref 130–400)
POTASSIUM SERPL-MCNC: 4.5 MMOL/L — SIGNIFICANT CHANGE UP (ref 3.5–5)
POTASSIUM SERPL-SCNC: 4.5 MMOL/L — SIGNIFICANT CHANGE UP (ref 3.5–5)
PROCALCITONIN SERPL-MCNC: 0.19 NG/ML — HIGH (ref 0.02–0.1)
PROT SERPL-MCNC: 5.7 G/DL — LOW (ref 6–8)
PROT UR-MCNC: SIGNIFICANT CHANGE UP
RBC # BLD: 5.25 M/UL — SIGNIFICANT CHANGE UP (ref 4.7–6.1)
RBC # FLD: 13.9 % — SIGNIFICANT CHANGE UP (ref 11.5–14.5)
RBC CASTS # UR COMP ASSIST: 0 /HPF — SIGNIFICANT CHANGE UP (ref 0–4)
RSV RNA NPH QL NAA+NON-PROBE: SIGNIFICANT CHANGE UP
SARS-COV-2 RNA SPEC QL NAA+PROBE: SIGNIFICANT CHANGE UP
SODIUM SERPL-SCNC: 131 MMOL/L — LOW (ref 135–146)
SP GR SPEC: 1.01 — SIGNIFICANT CHANGE UP (ref 1.01–1.03)
TROPONIN T SERPL-MCNC: 0.11 NG/ML — CRITICAL HIGH
TROPONIN T SERPL-MCNC: 0.11 NG/ML — CRITICAL HIGH
UROBILINOGEN FLD QL: SIGNIFICANT CHANGE UP
WBC # BLD: 9.88 K/UL — SIGNIFICANT CHANGE UP (ref 4.8–10.8)
WBC # FLD AUTO: 9.88 K/UL — SIGNIFICANT CHANGE UP (ref 4.8–10.8)
WBC UR QL: 0 /HPF — SIGNIFICANT CHANGE UP (ref 0–5)

## 2022-10-01 PROCEDURE — 99223 1ST HOSP IP/OBS HIGH 75: CPT

## 2022-10-01 PROCEDURE — 71260 CT THORAX DX C+: CPT | Mod: 26

## 2022-10-01 RX ORDER — CEFEPIME 1 G/1
2000 INJECTION, POWDER, FOR SOLUTION INTRAMUSCULAR; INTRAVENOUS EVERY 8 HOURS
Refills: 0 | Status: DISCONTINUED | OUTPATIENT
Start: 2022-10-01 | End: 2022-10-01

## 2022-10-01 RX ORDER — LANOLIN ALCOHOL/MO/W.PET/CERES
3 CREAM (GRAM) TOPICAL AT BEDTIME
Refills: 0 | Status: DISCONTINUED | OUTPATIENT
Start: 2022-10-01 | End: 2022-10-05

## 2022-10-01 RX ORDER — AZITHROMYCIN 500 MG/1
500 TABLET, FILM COATED ORAL EVERY 24 HOURS
Refills: 0 | Status: DISCONTINUED | OUTPATIENT
Start: 2022-10-01 | End: 2022-10-01

## 2022-10-01 RX ORDER — VANCOMYCIN HCL 1 G
1000 VIAL (EA) INTRAVENOUS EVERY 12 HOURS
Refills: 0 | Status: DISCONTINUED | OUTPATIENT
Start: 2022-10-01 | End: 2022-10-01

## 2022-10-01 RX ORDER — ACETAMINOPHEN 500 MG
650 TABLET ORAL EVERY 6 HOURS
Refills: 0 | Status: DISCONTINUED | OUTPATIENT
Start: 2022-10-01 | End: 2022-10-05

## 2022-10-01 RX ORDER — ONDANSETRON 8 MG/1
4 TABLET, FILM COATED ORAL EVERY 8 HOURS
Refills: 0 | Status: DISCONTINUED | OUTPATIENT
Start: 2022-10-01 | End: 2022-10-05

## 2022-10-01 RX ORDER — SODIUM CHLORIDE 9 MG/ML
1000 INJECTION INTRAMUSCULAR; INTRAVENOUS; SUBCUTANEOUS
Refills: 0 | Status: DISCONTINUED | OUTPATIENT
Start: 2022-10-01 | End: 2022-10-04

## 2022-10-01 RX ORDER — METOPROLOL TARTRATE 50 MG
25 TABLET ORAL DAILY
Refills: 0 | Status: DISCONTINUED | OUTPATIENT
Start: 2022-10-01 | End: 2022-10-05

## 2022-10-01 RX ORDER — ENOXAPARIN SODIUM 100 MG/ML
40 INJECTION SUBCUTANEOUS EVERY 24 HOURS
Refills: 0 | Status: DISCONTINUED | OUTPATIENT
Start: 2022-10-01 | End: 2022-10-05

## 2022-10-01 RX ADMIN — SODIUM CHLORIDE 100 MILLILITER(S): 9 INJECTION INTRAMUSCULAR; INTRAVENOUS; SUBCUTANEOUS at 18:20

## 2022-10-01 RX ADMIN — ENOXAPARIN SODIUM 40 MILLIGRAM(S): 100 INJECTION SUBCUTANEOUS at 06:38

## 2022-10-01 RX ADMIN — Medication 650 MILLIGRAM(S): at 22:58

## 2022-10-01 RX ADMIN — Medication 975 MILLIGRAM(S): at 07:31

## 2022-10-01 RX ADMIN — CEFEPIME 100 MILLIGRAM(S): 1 INJECTION, POWDER, FOR SOLUTION INTRAMUSCULAR; INTRAVENOUS at 11:19

## 2022-10-01 RX ADMIN — Medication 650 MILLIGRAM(S): at 18:23

## 2022-10-01 RX ADMIN — AZITHROMYCIN 255 MILLIGRAM(S): 500 TABLET, FILM COATED ORAL at 09:01

## 2022-10-01 RX ADMIN — Medication 25 MILLIGRAM(S): at 06:58

## 2022-10-01 RX ADMIN — Medication 250 MILLIGRAM(S): at 18:22

## 2022-10-01 RX ADMIN — CEFEPIME 100 MILLIGRAM(S): 1 INJECTION, POWDER, FOR SOLUTION INTRAMUSCULAR; INTRAVENOUS at 18:20

## 2022-10-01 RX ADMIN — Medication 650 MILLIGRAM(S): at 16:20

## 2022-10-01 NOTE — H&P ADULT - NSHPPHYSICALEXAM_GEN_ALL_CORE
LOS:     VITALS:   T(C): 36.1 (10-01-22 @ 01:39), Max: 39.1 (09-30-22 @ 22:55)  HR: 93 (10-01-22 @ 01:39) (91 - 122)  BP: 114/61 (10-01-22 @ 01:39) (109/60 - 114/61)  RR: 20 (10-01-22 @ 01:39) (20 - 20)  SpO2: 98% (10-01-22 @ 01:39) (97% - 99%)    GENERAL: NAD, lying in bed comfortably  HEAD:  Atraumatic, Normocephalic  EYES: EOMI, PERRLA, conjunctiva and sclera clear  ENT: Moist mucous membranes  NECK: Supple, No JVD  CHEST/LUNG: Clear to auscultation bilaterally; No rales, rhonchi, wheezing, or rubs. Unlabored respirations  HEART: Regular rate and rhythm; No murmurs, rubs, or gallops  ABDOMEN: BSx4; Soft, nontender, nondistended  EXTREMITIES:  2+ Peripheral Pulses, brisk capillary refill. No clubbing, cyanosis, or edema  NERVOUS SYSTEM:  A&Ox3, no focal deficits   SKIN: No rashes or lesions LOS:     VITALS:   T(C): 36.1 (10-01-22 @ 01:39), Max: 39.1 (09-30-22 @ 22:55)  HR: 93 (10-01-22 @ 01:39) (91 - 122)  BP: 114/61 (10-01-22 @ 01:39) (109/60 - 114/61)  RR: 20 (10-01-22 @ 01:39) (20 - 20)  SpO2: 98% (10-01-22 @ 01:39) (97% - 99%)    GENERAL: NAD, lying in bed comfortably  NECK: Supple, No JVD  CHEST/LUNG: Clear to auscultation bilaterally; No rales, rhonchi, wheezing, or rubs. Unlabored respirations  HEART: Regular rate and rhythm; bounding heart sounds, mild cardiac wheezes bilateral, no murmurs   ABDOMEN: BSx4; Soft, nontender, nondistended  EXTREMITIES:  2+ Peripheral Pulses, brisk capillary refill. No clubbing, cyanosis, or edema  NERVOUS SYSTEM:  A&Ox3, no focal deficits   SKIN: No rashes or lesions

## 2022-10-01 NOTE — H&P ADULT - ASSESSMENT
#DVT - heparin sq     #GI - pantoprazole     #Diet - DASH    #Activity - IAT     #Code - Full code     #Disposition - IP  23yo M Chadian-speaking with recent admission hx of sepsis c/b  myocarditis, rhabdomyolysis, large pericardial effusion s/p pericardial window (9/26)  and HFrEF (improved to 35-40%) presenting for fever    #Fever/chills - currently resolved   #Cardiac tamponade 2/2 myopericarditis s/p pericardiocentesis   #LLE pain in heel area   No thoracic process noted on PE   CXR no acute changes   - BCx f/u   - Consult ID   - CT chest w/ IV con   - Procal/ESR/CRP  - IV vancomycin for now f/u Cx  - monitor for signs of sepsis     #Transamniitis - since previous admission (downtrending)  prev abdo US   - trend LFTs   #Hyponatremia  126   - NS @100    #DVT - lovenox sq     #GI - not indicated     #Diet - DASH    #Activity - IAT     #Code - Full code     #Disposition - IP

## 2022-10-01 NOTE — H&P ADULT - NSHPLABSRESULTS_GEN_ALL_CORE
15.6   8.10  )-----------( 302      ( 30 Sep 2022 21:25 )             45.8           126<L>  |  92<L>  |  14  ----------------------------<  109<H>  5.3<H>   |  22  |  0.8    Ca    8.1<L>      30 Sep 2022 21:25    TPro  6.4  /  Alb  3.8  /  TBili  0.5  /  DBili  x   /  AST  200<H>  /  ALT  283<H>  /  AlkPhos  80                Urinalysis Basic - ( 01 Oct 2022 01:10 )    Color: Light Yellow / Appearance: Clear / S.013 / pH: x  Gluc: x / Ketone: Negative  / Bili: Negative / Urobili: <2 mg/dL   Blood: x / Protein: Trace / Nitrite: Negative   Leuk Esterase: Negative / RBC: 0 /HPF / WBC 0 /HPF   Sq Epi: x / Non Sq Epi: 0 /HPF / Bacteria: Negative        PT/INR - ( 30 Sep 2022 21:25 )   PT: 12.50 sec;   INR: 1.09 ratio         PTT - ( 30 Sep 2022 21:25 )  PTT:28.1 sec    Lactate Trend      CARDIAC MARKERS ( 30 Sep 2022 21:25 )  x     / 0.16 ng/mL / x     / x     / x            CAPILLARY BLOOD GLUCOSE            Culture Results:   Culture is being performed. ( @ 14:15)  Culture Results:   No growth at 5 days ( @ 19:00)  Culture Results:   Culture is being performed. ( @ 19:00)  Culture Results:   Testing in progress ( @ 19:00)  Culture Results:   No growth at 5 days ( @ 17:30)  Culture Results:   Culture is being performed. ( @ 17:30)  Culture Results:   Testing in progress ( @ 17:30)  Culture Results:   No growth ( @ 03:15)  Culture Results:   No Growth Final ( @ 21:10)  Culture Results:   No Growth Final ( @ 21:10)

## 2022-10-01 NOTE — H&P ADULT - ATTENDING COMMENTS
21yo M Thai-speaking with recent admission hx of sepsis c/b myocarditis, rhabdomyolysis, large pericardial effusion s/p pericardial window (9/26)  and HFrEF (improved to 35-40%) presenting for fever    #Sepsis, POA  #Possible GNR Pneumonia  #Recent hx of myocarditis sp pericardial window  Febrile to 102.4 in ED  CT Chest w IVC 10/01: Increasing opacity in the left lung base and lingula. Although this may reflect atelectasis, superimposed infectious process is difficult to exclude. Resolved pericardial effusion. Resolved right pleural effusion; Markedly decreased left pleural effusion  Rheum workup was negative on previous admission  - Cont cefepime 2g q8h  - Cont vancomycin 1g q12h  - Cont azithromycin 500mg qD  - f/u BCx, UCx, procalcitonin  - f/u ID eval  - f/u ESR, CRP    #Transaminitis  Downtrending since previous admission, possible d/t abx  - Cont trending LFTs    #Hyponatremia  - Cont IVF, monitor    DVT PPX, Lovenox    #Progress Note Handoff  Pending (specify): Cotn IV abx, BCx, procal, ID eval  Family discussion: d/w pt and family regarding infectious workup for fevers  Disposition: Home
Spontaneous, unlabored and symmetrical

## 2022-10-01 NOTE — CONSULT NOTE ADULT - ASSESSMENT
ASSESSMENT  23yo M Portuguese-speaking with recent admission hx of sepsis c/b  myocarditis, rhabdomyolysis, large pericardial effusion s/p pericardial window (9/26)  and HFrEF (improved to 35-40%) presenting for fever.     IMPRESSION  #    WBC 8   < from: CT Chest w/ IV Cont (10.01.22 @ 06:33) >  Resolved pericardial effusion  Resolved right pleural effusion  Markedly decreased left pleural effusion.  Increasing opacity in the left lung base and lingula. Although this may   reflect atelectasis, nevertheless, superimposed infectious processis   difficult to exclude.  #Cardiac tamponade 2/2 myopericarditis s/p pericardiocentesis   High CPK/aldolase  Quantiferon negative, then indeterminate x 2  Pericardial cx NG, AFB negative  HIV NEGATIVE   #Hyponatremia  #Transaminitis      Creatinine, Serum: 0.6 (10-01-22 @ 05:33)    Weight (kg): 65.8 (09-30-22 @ 19:55)    RECOMMENDATIONS  This is an incomplete consult note. All final recommendations to follow after interview and examination of the patient. Please follow recommendations noted below.    If any questions, please call or send a message on [employer] Teams  Please continue to update ID with any pertinent new laboratory or radiographic findings     ASSESSMENT  21yo M Pashto-speaking with recent admission hx of sepsis c/b  myocarditis, rhabdomyolysis, large pericardial effusion s/p pericardial window (9/26)  and HFrEF (improved to 35-40%) presenting for fever.     IMPRESSION  #Myopericarditis, recurrent Fever, doubt infectious process     No clinical symptoms to suggest bacterial PNA    WBC 8     Rapid RVP Result: NotDetec (09-24-22 @ 13:53)    HIV-1/2 Combo Result: Nonreact (09-23-22 @ 11:49)  < from: CT Chest w/ IV Cont (10.01.22 @ 06:33) >  Resolved pericardial effusion  Resolved right pleural effusion  Markedly decreased left pleural effusion.  Increasing opacity in the left lung base and lingula. Although this may   reflect atelectasis, nevertheless, superimposed infectious processis   difficult to exclude.  #Cardiac tamponade 2/2 myopericarditis s/p pericardiocentesis   Quantiferon negative, then indeterminate x 2  Pericardial cx NG, AFB negative  #Previous submandibular inflammation  #Rhabdo  High CPK/aldolase  Ferritin, Serum: 1084 ng/mL (09.24.22 @ 11:34)  #Hyponatremia  #Transaminitis    Hep B/C NEGATIVE   Creatinine, Serum: 0.6 (10-01-22 @ 05:33)    Weight (kg): 65.8 (09-30-22 @ 19:55)    RECOMMENDATIONS  - Monitor off antibiotics   - BCX  - Send fungitell   - Repeat ferritin/CPK  - Studies negative for TB  - Rheum f/u    If any questions, please call or send a message on [employer] Teams  Please continue to update ID with any pertinent new laboratory or radiographic findings

## 2022-10-01 NOTE — H&P ADULT - HISTORY OF PRESENT ILLNESS
23yo M with recent admission hx of sepsis c/b  myocarditis, rhabdomyolysis, large pericardial effusion s/p pericardial window and HFrEF (improved to 35-40% 23yo M with recent admission hx of sepsis c/b  myocarditis, rhabdomyolysis, large pericardial effusion s/p pericardial window and HFrEF (improved to 35-40%) presenting for  21yo M with recent admission hx of sepsis c/b  myocarditis, rhabdomyolysis, large pericardial effusion s/p pericardial window and HFrEF (improved to 35-40%) presenting for     ROS   Positive   Negative fever, chills, nausea, vomiting, chest pain, abdominal pain, sob, cough, constipation, diarrhea, incontinence     ED Vital Signs Last 24 Hrs  T(C): 36.1 (01 Oct 2022 01:39), Max: 39.1 (30 Sep 2022 22:55)  T(F): 97 (01 Oct 2022 01:39), Max: 102.4 (30 Sep 2022 22:55)  HR: 93 (01 Oct 2022 01:39) (91 - 122)  BP: 114/61 (01 Oct 2022 01:39) (109/60 - 114/61)  RR: 20 (01 Oct 2022 01:39) (20 - 20)  SpO2: 98% (01 Oct 2022 01:39) (97% - 99%)  O2 Parameters below as of 01 Oct 2022 01:39  Patient On (Oxygen Delivery Method): room air    Labs significant for Na 126 - K+ 5.2 -  -  - trop 0.16 - BNP 1433  UA neg - RVP neg   TTE done on 9/26 EF 35-40%     In the ED given vanco -         23yo M Tamazight-speaking with recent admission hx of sepsis c/b  myocarditis, rhabdomyolysis, large pericardial effusion s/p pericardial window (9/26)  and HFrEF (improved to 35-40%) presenting for fever. Fever started this morning associated with L heel pain that resolves with walking around, otherwise no complaints. Patient denies any trauma injury to leg. He denies any chest pain, sob, cough since previous discharge (9/28). He denies pain at site of pericardiocentesis.   To note he has stayed at home since discharge and started feeling tired and lethargic with fever and chills this AM.     ROS   Positive fever, chills, leg pain   Negative nausea, vomiting, chest pain, abdominal pain, sob, cough, constipation, diarrhea, incontinence     ED Vital Signs Last 24 Hrs  T(C): 36.1 (01 Oct 2022 01:39), Max: 39.1 (30 Sep 2022 22:55)  T(F): 97 (01 Oct 2022 01:39), Max: 102.4 (30 Sep 2022 22:55)  HR: 93 (01 Oct 2022 01:39) (91 - 122)  BP: 114/61 (01 Oct 2022 01:39) (109/60 - 114/61)  RR: 20 (01 Oct 2022 01:39) (20 - 20)  SpO2: 98% (01 Oct 2022 01:39) (97% - 99%)  O2 Parameters below as of 01 Oct 2022 01:39  Patient On (Oxygen Delivery Method): room air    Labs significant for Na 126 - K+ 5.2 -  -  - trop 0.16 - BNP 1433  UA neg - RVP neg   CXR no acute changes   TTE done on 9/26 EF 35-40%   In the ED given vanco -

## 2022-10-01 NOTE — CONSULT NOTE ADULT - SUBJECTIVE AND OBJECTIVE BOX
DEMETRIA LAZCANO  22y, Male  Allergy: No Known Allergies      CHIEF COMPLAINT:   fever, hyponatremia, elevated troponins (01 Oct 2022 03:06)      LOS      HPI  HPI:  21yo M Vietnamese-speaking with recent admission hx of sepsis c/b  myocarditis, rhabdomyolysis, large pericardial effusion s/p pericardial window ()  and HFrEF (improved to 35-40%) presenting for fever. Fever started this morning associated with L heel pain that resolves with walking around, otherwise no complaints. Patient denies any trauma injury to leg. He denies any chest pain, sob, cough since previous discharge (). He denies pain at site of pericardiocentesis.   To note he has stayed at home since discharge and started feeling tired and lethargic with fever and chills this AM.     ROS   Positive fever, chills, leg pain   Negative nausea, vomiting, chest pain, abdominal pain, sob, cough, constipation, diarrhea, incontinence     ED Vital Signs Last 24 Hrs  T(C): 36.1 (01 Oct 2022 01:39), Max: 39.1 (30 Sep 2022 22:55)  T(F): 97 (01 Oct 2022 01:39), Max: 102.4 (30 Sep 2022 22:55)  HR: 93 (01 Oct 2022 01:39) (91 - 122)  BP: 114/61 (01 Oct 2022 01:39) (109/60 - 114/61)  RR: 20 (01 Oct 2022 01:39) (20 - 20)  SpO2: 98% (01 Oct 2022 01:39) (97% - 99%)  O2 Parameters below as of 01 Oct 2022 01:39  Patient On (Oxygen Delivery Method): room air    Labs significant for Na 126 - K+ 5.2 -  -  - trop 0.16 - BNP 1433  UA neg - RVP neg   CXR no acute changes   TTE done on  EF 35-40%   In the ED given vanco -         (01 Oct 2022 03:06)      INFECTIOUS DISEASE HISTORY:  ID consulted for myopericarditis  on vancomycin, cefepime, azithro    [facility]  PAST MEDICAL & SURGICAL HISTORY:  No pertinent past medical history      No significant past surgical history          FAMILY HISTORY  No pertinent family history in first degree relatives        SOCIAL HISTORY  Social History:  Denies alcohol use, smoking  Lives with family at home  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)        ROS  ***    VITALS:  T(F): 98.7, Max: 102.4 (22 @ 22:55)  HR: 98  BP: 121/68  RR: 18Vital Signs Last 24 Hrs  T(C): 37.1 (01 Oct 2022 07:54), Max: 39.1 (30 Sep 2022 22:55)  T(F): 98.7 (01 Oct 2022 07:54), Max: 102.4 (30 Sep 2022 22:55)  HR: 98 (01 Oct 2022 07:54) (91 - 122)  BP: 121/68 (01 Oct 2022 07:54) (109/60 - 121/72)  BP(mean): --  RR: 18 (01 Oct 2022 07:54) (18 - 20)  SpO2: 100% (01 Oct 2022 07:54) (97% - 100%)    Parameters below as of 01 Oct 2022 07:54  Patient On (Oxygen Delivery Method): room air        PHYSICAL EXAM:  ***    TESTS & MEASUREMENTS:                        14.4   9.88  )-----------( 254      ( 01 Oct 2022 05:33 )             42.6     10-01    131<L>  |  97<L>  |  16  ----------------------------<  98  4.5   |  23  |  0.6<L>    Ca    8.1<L>      01 Oct 2022 05:33  Mg     2.3     10-    TPro  5.7<L>  /  Alb  3.3<L>  /  TBili  0.4  /  DBili  x   /  AST  150<H>  /  ALT  231<H>  /  AlkPhos  74  10-01      LIVER FUNCTIONS - ( 01 Oct 2022 05:33 )  Alb: 3.3 g/dL / Pro: 5.7 g/dL / ALK PHOS: 74 U/L / ALT: 231 U/L / AST: 150 U/L / GGT: x           Urinalysis Basic - ( 01 Oct 2022 01:10 )    Color: Light Yellow / Appearance: Clear / S.013 / pH: x  Gluc: x / Ketone: Negative  / Bili: Negative / Urobili: <2 mg/dL   Blood: x / Protein: Trace / Nitrite: Negative   Leuk Esterase: Negative / RBC: 0 /HPF / WBC 0 /HPF   Sq Epi: x / Non Sq Epi: 0 /HPF / Bacteria: Negative        Culture - Acid Fast - Body Fluid w/Smear (collected 22 @ 14:15)  Source: Pericardial pericardial fluid  Preliminary Report (22 @ 15:04):    Culture is being performed.    Culture - Fungal, Body Fluid (collected 22 @ 19:00)  Source: .Body Fluid None  Preliminary Report (22 @ 07:28):    Testing in progress    Culture - Acid Fast - Body Fluid w/Smear (collected 22 @ 19:00)  Source: .Body Fluid None  Preliminary Report (22 @ 15:04):    Culture is being performed.    Culture - Body Fluid with Gram Stain (collected 22 @ 19:00)  Source: .Body Fluid None  Gram Stain (22 @ 04:32):    No polymorphonuclear leukocytes seen    No organisms seen    by cytocentrifuge  Final Report (22 @ 16:00):    No growth at 5 days    Culture - Fungal, Tissue (collected 22 @ 17:30)  Source: .Tissue None  Preliminary Report (22 @ 07:27):    Testing in progress    Culture - Acid Fast - Tissue w/Smear (collected 22 @ 17:30)  Source: .Tissue None  Preliminary Report (22 @ 15:04):    Culture is being performed.    Culture - Tissue with Gram Stain (collected 22 @ 17:30)  Source: .Tissue None  Gram Stain (22 @ 04:31):    No polymorphonuclear leukocytes seen per low power field    No organisms seen per oil power field  Final Report (22 @ 18:09):    No growth at 5 days    Culture - Urine (collected 22 @ 03:15)  Source: Clean Catch Clean Catch (Midstream)  Final Report (22 @ 14:50):    No growth    Culture - Blood (collected 22 @ 21:10)  Source: .Blood Blood-Peripheral  Final Report (22 @ 02:00):    No Growth Final    Culture - Blood (collected 22 @ 21:10)  Source: .Blood Blood-Peripheral  Final Report (22 @ 02:00):    No Growth Final            INFECTIOUS DISEASES TESTING  Rapid RVP Result: NotDetec (22 @ 13:53)  HIV-1/2 Combo Result: Nonreact (22 @ 11:49)  Hepatitis B Surface Antigen: Nonreact (22 @ 05:57)  Hepatitis C Virus Interpretation: Nonreact (22 @ 05:57)  Hepatitis B Surface Antigen: Nonreact (22 @ 11:38)  Hepatitis C Virus Interpretation: Nonreact (22 @ 11:38)      INFLAMMATORY MARKERS      RADIOLOGY & ADDITIONAL TESTS:  I have personally reviewed the last Chest xray  CXR      CT  CT Chest w/ IV Cont:   ACC: [id number] EXAM:  CT CHEST IC                          PROCEDURE DATE:  10/01/2022          INTERPRETATION:  CLINICAL HISTORY/REASON FOR EXAM: Chest pain,   pericarditis, post pericardial fluid drainage.    TECHNIQUE: Multislice helical sectionswere obtained from the thoracic   inlet to the lung bases during rapid administration of intravenous   contrast. Thin sections were reconstructed through the pulmonary   vasculature. Study was performed as CT angiogram. 3D (MIP) reformats   obtained.    COMPARISON: 2022      FINDINGS:    LUNGS, PLEURA, AIRWAYS: There is a resolved right and decreased left   trace pleural effusion. Linear subsegmental atelectasis in the right lung   base. Increased opacity/consolidation in the lingula and left lung base.   Infectious process is difficult to exclude. No pneumothorax. No evidence   of central endobronchial obstruction.    THORACIC NODES: No mediastinal, hilar, supraclavicular, or axillary   lymphadenopathy.    MEDIASTINUM/GREAT VESSELS: Resolved pericardial effusion. Heart size is   within normal limits. The aorta and main pulmonary artery are of normal   caliber.    BONES/SOFT TISSUES: No acute osseous abnormality.    VISUALIZED UPPER ABDOMEN: Hepatic steatosis      IMPRESSION:      Resolved pericardial effusion    Resolved right pleural effusion    Markedly decreased left pleural effusion.    Increasing opacity in the left lung base and lingula. Although this may   reflect atelectasis, nevertheless, superimposed infectious processis   difficult to exclude.          --- End of Report ---            MARTA MCNALLY MD; Attending Radiologist  This document has been electronically signed. Oct  1 2022  6:51AM (10-01-22 @ 06:33)      CARDIOLOGY TESTING  12 Lead ECG:   Ventricular Rate 115 BPM    Atrial Rate 115 BPM    P-R Interval 128 ms    QRS Duration 86 ms    Q-T Interval 302 ms    QTC Calculation(Bazett) 417 ms    P Axis 34 degrees    R Axis 27 degrees    T Axis 18 degrees    Diagnosis Line Sinus tachycardia  T wave abnormality, consider anterior ischemia  Abnormal ECG    Confirmed by OBINNA PARTIDA MD ([id number]) on 10/1/2022 7:52:02 AM (22 @ 22:11)  12 Lead ECG:   Ventricular Rate 124 BPM    Atrial Rate 124 BPM    P-R Interval 124 ms    QRS Duration 86 ms    Q-T Interval 302 ms    QTC Calculation(Bazett) 433 ms    P Axis 33 degrees    R Axis 25 degrees    T Axis 1 degrees    Diagnosis Line Sinus tachycardia  Nonspecific T wave abnormality  Abnormal ECG    Confirmed by OBINNA PARTIDA MD ([id number]) on 10/1/2022 7:51:58 AM (22 @ 19:56)      MEDICATIONS  azithromycin  IVPB 500 IV Intermittent every 24 hours  cefepime   IVPB 2000 IV Intermittent every 8 hours  enoxaparin Injectable 40 SubCutaneous every 24 hours  metoprolol succinate ER 25 Oral daily  sodium chloride 0.9%. 1000 IV Continuous <Continuous>  vancomycin  IVPB 1000 IV Intermittent every 12 hours        ANTIBIOTICS:  azithromycin  IVPB 500 milliGRAM(s) IV Intermittent every 24 hours  cefepime   IVPB 2000 milliGRAM(s) IV Intermittent every 8 hours  vancomycin  IVPB 1000 milliGRAM(s) IV Intermittent every 12 hours      ALLERGIES:  No Known Allergies       DEMETRIA LAZCANO  22y, Male  Allergy: No Known Allergies      CHIEF COMPLAINT:   fever, hyponatremia, elevated troponins (01 Oct 2022 03:06)      LOS      HPI  HPI:  23yo M English-speaking with recent admission hx of sepsis c/b  myocarditis, rhabdomyolysis, large pericardial effusion s/p pericardial window ()  and HFrEF (improved to 35-40%) presenting for fever. Fever started this morning associated with L heel pain that resolves with walking around, otherwise no complaints. Patient denies any trauma injury to leg. He denies any chest pain, sob, cough since previous discharge (). He denies pain at site of pericardiocentesis.   To note he has stayed at home since discharge and started feeling tired and lethargic with fever and chills this AM.     ROS   Positive fever, chills, leg pain   Negative nausea, vomiting, chest pain, abdominal pain, sob, cough, constipation, diarrhea, incontinence     ED Vital Signs Last 24 Hrs  T(C): 36.1 (01 Oct 2022 01:39), Max: 39.1 (30 Sep 2022 22:55)  T(F): 97 (01 Oct 2022 01:39), Max: 102.4 (30 Sep 2022 22:55)  HR: 93 (01 Oct 2022 01:39) (91 - 122)  BP: 114/61 (01 Oct 2022 01:39) (109/60 - 114/61)  RR: 20 (01 Oct 2022 01:39) (20 - 20)  SpO2: 98% (01 Oct 2022 01:39) (97% - 99%)  O2 Parameters below as of 01 Oct 2022 01:39  Patient On (Oxygen Delivery Method): room air    Labs significant for Na 126 - K+ 5.2 -  -  - trop 0.16 - BNP 1433  UA neg - RVP neg   CXR no acute changes   TTE done on  EF 35-40%   In the ED given vanco -         (01 Oct 2022 03:06)      INFECTIOUS DISEASE HISTORY:  ID consulted for myopericarditis  on vancomycin, cefepime, azithro  Reports fever and HA Pt denies rhinorrhea, sore throat, cough, SOB, abd pain, diarrhea, n/v, dysuria, rash, arthralgias.     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)        ROS  General: Denies rigors, nightsweats  HEENT: Denies headache, rhinorrhea, sore throat, eye pain  CV: Denies CP, palpitations  PULM: Denies wheezing, hemoptysis  GI: Denies hematemesis, hematochezia, melena  : Denies discharge, hematuria  MSK: Denies arthralgias, myalgias  SKIN: Denies rash, lesions  NEURO: Denies paresthesias, weakness  PSYCH: Denies depression, anxiety     VITALS:  T(F): 98.7, Max: 102.4 (22 @ 22:55)  HR: 98  BP: 121/68  RR: 18Vital Signs Last 24 Hrs  T(C): 37.1 (01 Oct 2022 07:54), Max: 39.1 (30 Sep 2022 22:55)  T(F): 98.7 (01 Oct 2022 07:54), Max: 102.4 (30 Sep 2022 22:55)  HR: 98 (01 Oct 2022 07:54) (91 - 122)  BP: 121/68 (01 Oct 2022 07:54) (109/60 - 121/72)  BP(mean): --  RR: 18 (01 Oct 2022 07:54) (18 - 20)  SpO2: 100% (01 Oct 2022 07:54) (97% - 100%)    Parameters below as of 01 Oct 2022 07:54  Patient On (Oxygen Delivery Method): room air        PHYSICAL EXAM:  Gen: NAD, resting in bed  HEENT: Normocephalic, atraumatic  Neck: supple, no lymphadenopathy , no neck stiffness  CV: Regular rate & regular rhythm  Lungs: decreased BS at bases, no fremitus  Abdomen: Soft, BS present  Ext: Warm, well perfused  Neuro: non focal, awake  Skin: no rash, no erythema  Lines: no phlebitis     TESTS & MEASUREMENTS:                        14.4   9.88  )-----------( 254      ( 01 Oct 2022 05:33 )             42.6     10    131<L>  |  97<L>  |  16  ----------------------------<  98  4.5   |  23  |  0.6<L>    Ca    8.1<L>      01 Oct 2022 05:33  Mg     2.3     10    TPro  5.7<L>  /  Alb  3.3<L>  /  TBili  0.4  /  DBili  x   /  AST  150<H>  /  ALT  231<H>  /  AlkPhos  74  10      LIVER FUNCTIONS - ( 01 Oct 2022 05:33 )  Alb: 3.3 g/dL / Pro: 5.7 g/dL / ALK PHOS: 74 U/L / ALT: 231 U/L / AST: 150 U/L / GGT: x           Urinalysis Basic - ( 01 Oct 2022 01:10 )    Color: Light Yellow / Appearance: Clear / S.013 / pH: x  Gluc: x / Ketone: Negative  / Bili: Negative / Urobili: <2 mg/dL   Blood: x / Protein: Trace / Nitrite: Negative   Leuk Esterase: Negative / RBC: 0 /HPF / WBC 0 /HPF   Sq Epi: x / Non Sq Epi: 0 /HPF / Bacteria: Negative        Culture - Acid Fast - Body Fluid w/Smear (collected 22 @ 14:15)  Source: Pericardial pericardial fluid  Preliminary Report (22 @ 15:04):    Culture is being performed.    Culture - Fungal, Body Fluid (collected 22 @ 19:00)  Source: .Body Fluid None  Preliminary Report (22 @ 07:28):    Testing in progress    Culture - Acid Fast - Body Fluid w/Smear (collected 22 @ 19:00)  Source: .Body Fluid None  Preliminary Report (22 @ 15:04):    Culture is being performed.    Culture - Body Fluid with Gram Stain (collected 22 @ 19:00)  Source: .Body Fluid None  Gram Stain (22 @ 04:32):    No polymorphonuclear leukocytes seen    No organisms seen    by cytocentrifuge  Final Report (22 @ 16:00):    No growth at 5 days    Culture - Fungal, Tissue (collected 22 @ 17:30)  Source: .Tissue None  Preliminary Report (22 @ 07:27):    Testing in progress    Culture - Acid Fast - Tissue w/Smear (collected 22 @ 17:30)  Source: .Tissue None  Preliminary Report (22 @ 15:04):    Culture is being performed.    Culture - Tissue with Gram Stain (collected 22 @ 17:30)  Source: .Tissue None  Gram Stain (22 @ 04:31):    No polymorphonuclear leukocytes seen per low power field    No organisms seen per oil power field  Final Report (22 @ 18:09):    No growth at 5 days    Culture - Urine (collected 22 @ 03:15)  Source: Clean Catch Clean Catch (Midstream)  Final Report (22 @ 14:50):    No growth    Culture - Blood (collected 22 @ 21:10)  Source: .Blood Blood-Peripheral  Final Report (22 @ 02:00):    No Growth Final    Culture - Blood (collected 22 @ 21:10)  Source: .Blood Blood-Peripheral  Final Report (22 @ 02:00):    No Growth Final            INFECTIOUS DISEASES TESTING  Rapid RVP Result: NotDetec (22 @ 13:53)  HIV-1/2 Combo Result: Nonreact (22 @ 11:49)  Hepatitis B Surface Antigen: Nonreact (22 @ 05:57)  Hepatitis C Virus Interpretation: Nonreact (22 @ 05:57)  Hepatitis B Surface Antigen: Nonreact (22 @ 11:38)  Hepatitis C Virus Interpretation: Nonreact (22 @ 11:38)      INFLAMMATORY MARKERS      RADIOLOGY & ADDITIONAL TESTS:  I have personally reviewed the last Chest xray  CXR      CT  CT Chest w/ IV Cont:   ACC: [id number] EXAM:  CT CHEST IC                          PROCEDURE DATE:  10/01/2022          INTERPRETATION:  CLINICAL HISTORY/REASON FOR EXAM: Chest pain,   pericarditis, post pericardial fluid drainage.    TECHNIQUE: Multislice helical sectionswere obtained from the thoracic   inlet to the lung bases during rapid administration of intravenous   contrast. Thin sections were reconstructed through the pulmonary   vasculature. Study was performed as CT angiogram. 3D (MIP) reformats   obtained.    COMPARISON: 2022      FINDINGS:    LUNGS, PLEURA, AIRWAYS: There is a resolved right and decreased left   trace pleural effusion. Linear subsegmental atelectasis in the right lung   base. Increased opacity/consolidation in the lingula and left lung base.   Infectious process is difficult to exclude. No pneumothorax. No evidence   of central endobronchial obstruction.    THORACIC NODES: No mediastinal, hilar, supraclavicular, or axillary   lymphadenopathy.    MEDIASTINUM/GREAT VESSELS: Resolved pericardial effusion. Heart size is   within normal limits. The aorta and main pulmonary artery are of normal   caliber.    BONES/SOFT TISSUES: No acute osseous abnormality.    VISUALIZED UPPER ABDOMEN: Hepatic steatosis      IMPRESSION:      Resolved pericardial effusion    Resolved right pleural effusion    Markedly decreased left pleural effusion.    Increasing opacity in the left lung base and lingula. Although this may   reflect atelectasis, nevertheless, superimposed infectious processis   difficult to exclude.          --- End of Report ---            MARTA MCNALLY MD; Attending Radiologist  This document has been electronically signed. Oct  1 2022  6:51AM (10-01-22 @ 06:33)      CARDIOLOGY TESTING  12 Lead ECG:   Ventricular Rate 115 BPM    Atrial Rate 115 BPM    P-R Interval 128 ms    QRS Duration 86 ms    Q-T Interval 302 ms    QTC Calculation(Bazett) 417 ms    P Axis 34 degrees    R Axis 27 degrees    T Axis 18 degrees    Diagnosis Line Sinus tachycardia  T wave abnormality, consider anterior ischemia  Abnormal ECG    Confirmed by OBINNA PARTIDA MD ([id number]) on 10/1/2022 7:52:02 AM (22 @ 22:11)  12 Lead ECG:   Ventricular Rate 124 BPM    Atrial Rate 124 BPM    P-R Interval 124 ms    QRS Duration 86 ms    Q-T Interval 302 ms    QTC Calculation(Bazett) 433 ms    P Axis 33 degrees    R Axis 25 degrees    T Axis 1 degrees    Diagnosis Line Sinus tachycardia  Nonspecific T wave abnormality  Abnormal ECG    Confirmed by OBINNA PARTIDA MD ([id number]) on 10/1/2022 7:51:58 AM (22 @ 19:56)      MEDICATIONS  azithromycin  IVPB 500 IV Intermittent every 24 hours  cefepime   IVPB 2000 IV Intermittent every 8 hours  enoxaparin Injectable 40 SubCutaneous every 24 hours  metoprolol succinate ER 25 Oral daily  sodium chloride 0.9%. 1000 IV Continuous <Continuous>  vancomycin  IVPB 1000 IV Intermittent every 12 hours        ANTIBIOTICS:  azithromycin  IVPB 500 milliGRAM(s) IV Intermittent every 24 hours  cefepime   IVPB 2000 milliGRAM(s) IV Intermittent every 8 hours  vancomycin  IVPB 1000 milliGRAM(s) IV Intermittent every 12 hours      ALLERGIES:  No Known Allergies

## 2022-10-02 LAB
ALBUMIN SERPL ELPH-MCNC: 3.2 G/DL — LOW (ref 3.5–5.2)
ALP SERPL-CCNC: 62 U/L — SIGNIFICANT CHANGE UP (ref 30–115)
ALT FLD-CCNC: 194 U/L — HIGH (ref 0–41)
ANION GAP SERPL CALC-SCNC: 12 MMOL/L — SIGNIFICANT CHANGE UP (ref 7–14)
AST SERPL-CCNC: 149 U/L — HIGH (ref 0–41)
BASOPHILS # BLD AUTO: 0.02 K/UL — SIGNIFICANT CHANGE UP (ref 0–0.2)
BASOPHILS NFR BLD AUTO: 0.3 % — SIGNIFICANT CHANGE UP (ref 0–1)
BILIRUB SERPL-MCNC: 0.5 MG/DL — SIGNIFICANT CHANGE UP (ref 0.2–1.2)
BUN SERPL-MCNC: 13 MG/DL — SIGNIFICANT CHANGE UP (ref 10–20)
CALCIUM SERPL-MCNC: 7.9 MG/DL — LOW (ref 8.4–10.5)
CHLORIDE SERPL-SCNC: 95 MMOL/L — LOW (ref 98–110)
CK SERPL-CCNC: 5855 U/L — HIGH (ref 0–225)
CO2 SERPL-SCNC: 22 MMOL/L — SIGNIFICANT CHANGE UP (ref 17–32)
CREAT SERPL-MCNC: 0.5 MG/DL — LOW (ref 0.7–1.5)
CRP SERPL-MCNC: 47.2 MG/L — HIGH
CULTURE RESULTS: NO GROWTH — SIGNIFICANT CHANGE UP
EGFR: 149 ML/MIN/1.73M2 — SIGNIFICANT CHANGE UP
EOSINOPHIL # BLD AUTO: 0.36 K/UL — SIGNIFICANT CHANGE UP (ref 0–0.7)
EOSINOPHIL NFR BLD AUTO: 4.6 % — SIGNIFICANT CHANGE UP (ref 0–8)
ERYTHROCYTE [SEDIMENTATION RATE] IN BLOOD: 41 MM/HR — HIGH (ref 0–10)
GLUCOSE SERPL-MCNC: 88 MG/DL — SIGNIFICANT CHANGE UP (ref 70–99)
HCT VFR BLD CALC: 40.4 % — LOW (ref 42–52)
HGB BLD-MCNC: 13.8 G/DL — LOW (ref 14–18)
IMM GRANULOCYTES NFR BLD AUTO: 0.8 % — HIGH (ref 0.1–0.3)
LYMPHOCYTES # BLD AUTO: 1.33 K/UL — SIGNIFICANT CHANGE UP (ref 1.2–3.4)
LYMPHOCYTES # BLD AUTO: 16.9 % — LOW (ref 20.5–51.1)
MCHC RBC-ENTMCNC: 27.7 PG — SIGNIFICANT CHANGE UP (ref 27–31)
MCHC RBC-ENTMCNC: 34.2 G/DL — SIGNIFICANT CHANGE UP (ref 32–37)
MCV RBC AUTO: 81.1 FL — SIGNIFICANT CHANGE UP (ref 80–94)
MONOCYTES # BLD AUTO: 0.4 K/UL — SIGNIFICANT CHANGE UP (ref 0.1–0.6)
MONOCYTES NFR BLD AUTO: 5.1 % — SIGNIFICANT CHANGE UP (ref 1.7–9.3)
NEUTROPHILS # BLD AUTO: 5.71 K/UL — SIGNIFICANT CHANGE UP (ref 1.4–6.5)
NEUTROPHILS NFR BLD AUTO: 72.3 % — SIGNIFICANT CHANGE UP (ref 42.2–75.2)
NRBC # BLD: 0 /100 WBCS — SIGNIFICANT CHANGE UP (ref 0–0)
PLATELET # BLD AUTO: 230 K/UL — SIGNIFICANT CHANGE UP (ref 130–400)
POTASSIUM SERPL-MCNC: 4.6 MMOL/L — SIGNIFICANT CHANGE UP (ref 3.5–5)
POTASSIUM SERPL-SCNC: 4.6 MMOL/L — SIGNIFICANT CHANGE UP (ref 3.5–5)
PROT SERPL-MCNC: 5.6 G/DL — LOW (ref 6–8)
RBC # BLD: 4.98 M/UL — SIGNIFICANT CHANGE UP (ref 4.7–6.1)
RBC # FLD: 13.7 % — SIGNIFICANT CHANGE UP (ref 11.5–14.5)
SODIUM SERPL-SCNC: 129 MMOL/L — LOW (ref 135–146)
SPECIMEN SOURCE: SIGNIFICANT CHANGE UP
VANCOMYCIN TROUGH SERPL-MCNC: <4 UG/ML — LOW (ref 5–10)
WBC # BLD: 7.88 K/UL — SIGNIFICANT CHANGE UP (ref 4.8–10.8)
WBC # FLD AUTO: 7.88 K/UL — SIGNIFICANT CHANGE UP (ref 4.8–10.8)

## 2022-10-02 PROCEDURE — 99233 SBSQ HOSP IP/OBS HIGH 50: CPT

## 2022-10-02 RX ADMIN — ENOXAPARIN SODIUM 40 MILLIGRAM(S): 100 INJECTION SUBCUTANEOUS at 06:27

## 2022-10-02 RX ADMIN — SODIUM CHLORIDE 100 MILLILITER(S): 9 INJECTION INTRAMUSCULAR; INTRAVENOUS; SUBCUTANEOUS at 15:58

## 2022-10-02 RX ADMIN — Medication 650 MILLIGRAM(S): at 06:26

## 2022-10-02 RX ADMIN — Medication 25 MILLIGRAM(S): at 06:27

## 2022-10-02 RX ADMIN — Medication 650 MILLIGRAM(S): at 22:03

## 2022-10-02 RX ADMIN — SODIUM CHLORIDE 100 MILLILITER(S): 9 INJECTION INTRAMUSCULAR; INTRAVENOUS; SUBCUTANEOUS at 06:23

## 2022-10-02 RX ADMIN — Medication 650 MILLIGRAM(S): at 15:57

## 2022-10-02 NOTE — PROGRESS NOTE ADULT - SUBJECTIVE AND OBJECTIVE BOX
DEMETRIA LAZCANO  22y  Male      Patient is a 22y old  Male who presents with fever, hyponatremia, elevated troponins (01 Oct 2022 12:01)      INTERVAL HPI/OVERNIGHT EVENTS:  Patient seen and examined earlier this morning  lying comfortably in bed  in nad  using incentive spirometer  denies any complaints       REVIEW OF SYSTEMS:  CONSTITUTIONAL: No fever, weight loss, or fatigue  EYES: No eye pain, visual disturbances, or discharge  ENMT:  No difficulty hearing, tinnitus, vertigo; No sinus or throat pain  NECK: No pain or stiffness  RESPIRATORY: No cough, wheezing, chills or hemoptysis; No shortness of breath  CARDIOVASCULAR: No chest pain, palpitations, dizziness, or leg swelling  GASTROINTESTINAL: No abdominal or epigastric pain. No nausea, vomiting, or hematemesis; No diarrhea or constipation. No melena or hematochezia.  GENITOURINARY: No dysuria, frequency, hematuria, or incontinence  NEUROLOGICAL: No headaches, memory loss, loss of strength, numbness, or tremors  SKIN: No itching, burning, rashes, or lesions   LYMPH NODES: No enlarged glands  ENDOCRINE: No heat or cold intolerance; No hair loss  MUSCULOSKELETAL: No joint pain or swelling; No muscle, back, or extremity pain  PSYCHIATRIC: No depression, anxiety, mood swings, or difficulty sleeping  HEME/LYMPH: No easy bruising, or bleeding gums  ALLERY AND IMMUNOLOGIC: No hives or eczema    T(C): 37.2 (10-02-22 @ 12:07), Max: 39.2 (10-01-22 @ 16:02)  HR: 101 (10-02-22 @ 08:00) (95 - 117)  BP: 100/57 (10-02-22 @ 08:00) (100/57 - 118/58)  RR: 18 (10-02-22 @ 08:00) (18 - 18)  SpO2: 96% (10-02-22 @ 08:00) (96% - 97%) on ra    PHYSICAL EXAM:  GENERAL: NAD, well-groomed, well-developed  HEAD:  Atraumatic, Normocephalic  EYES: EOMI, PERRLA, conjunctiva and sclera clear  ENMT: No tonsillar erythema, exudates, or enlargement; Moist mucous membranes, Good dentition, No lesions  NECK: Supple, No JVD, Normal thyroid  NERVOUS SYSTEM:  Alert & Oriented X3, Good concentration; Moves all extremities   CHEST/LUNG: decreased breath sounds at bases   HEART: Regular rate and rhythm; No murmurs, rubs, or gallops  ABDOMEN: Soft, Nontender, Nondistended; Bowel sounds present  EXTREMITIES:  2+ Peripheral Pulses, No clubbing, cyanosis, or edema  LYMPH: No lymphadenopathy noted  SKIN: No rashes or lesions    Consultant(s) Notes Reviewed:  [x ] YES  [ ] NO  Care Discussed with Consultants/Other Providers [ x] YES  [ ] NO    LAB:                        13.8   7.88  )-----------( 230      ( 02 Oct 2022 10:00 )             40.4     10-02    129<L>  |  95<L>  |  13  ----------------------------<  88  4.6   |  22  |  0.5<L>    Ca    7.9<L>      02 Oct 2022 09:55  Mg     2.3     10-    TPro  5.6<L>  /  Alb  3.2<L>  /  TBili  0.5  /  DBili  x   /  AST  149<H>  /  ALT  194<H>  /  AlkPhos  62  10-02    LIVER FUNCTIONS - ( 02 Oct 2022 09:55 )  Alb: 3.2 g/dL / Pro: 5.6 g/dL / ALK PHOS: 62 U/L / ALT: 194 U/L / AST: 149 U/L / GGT: x           CARDIAC MARKERS ( 01 Oct 2022 16:51 )  x     / 0.11 ng/mL / x     / x     / x      CARDIAC MARKERS ( 01 Oct 2022 12:41 )  x     / 0.11 ng/mL / 6457 U/L / x     / x      CARDIAC MARKERS ( 30 Sep 2022 21:25 )  x     / 0.16 ng/mL / x     / x     / x          Drug Dosing Weight  Height (cm): 160 (02 Oct 2022 00:14)  Weight (kg): 65.8 (01 Oct 2022 21:00)  BMI (kg/m2): 25.7 (02 Oct 2022 00:14)  BSA (m2): 1.69 (02 Oct 2022 00:14)    I&O's Summary    02 Oct 2022 07:01  -  02 Oct 2022 13:59  --------------------------------------------------------  IN: 0 mL / OUT: 350 mL / NET: -350 mL      Urinalysis Basic - ( 01 Oct 2022 01:10 )    Color: Light Yellow / Appearance: Clear / S.013 / pH: x  Gluc: x / Ketone: Negative  / Bili: Negative / Urobili: <2 mg/dL   Blood: x / Protein: Trace / Nitrite: Negative   Leuk Esterase: Negative / RBC: 0 /HPF / WBC 0 /HPF   Sq Epi: x / Non Sq Epi: 0 /HPF / Bacteria: Negative        RADIOLOGY & ADDITIONAL TESTS:  Imaging Personally Reviewed:  [x] YES  [ ] NO  < from: CT Chest w/ IV Cont (10.01.22 @ 06:33) >  IMPRESSION:      Resolved pericardial effusion    Resolved right pleural effusion    Markedly decreased left pleural effusion.    Increasing opacity in the left lung base and lingula. Although this may   reflect atelectasis, nevertheless, superimposed infectious processis   difficult to exclude.    < end of copied text >      MEDS:  acetaminophen     Tablet .. 650 milliGRAM(s) Oral every 6 hours PRN  aluminum hydroxide/magnesium hydroxide/simethicone Suspension 30 milliLiter(s) Oral every 4 hours PRN  enoxaparin Injectable 40 milliGRAM(s) SubCutaneous every 24 hours  melatonin 3 milliGRAM(s) Oral at bedtime PRN  metoprolol succinate ER 25 milliGRAM(s) Oral daily  ondansetron Injectable 4 milliGRAM(s) IV Push every 8 hours PRN  sodium chloride 0.9%. 1000 milliLiter(s) IV Continuous <Continuous>

## 2022-10-02 NOTE — PATIENT PROFILE ADULT - FALL HARM RISK - HARM RISK INTERVENTIONS

## 2022-10-02 NOTE — PROGRESS NOTE ADULT - ASSESSMENT
23yo M Kiswahili-speaking with recent admission hx of sepsis c/b myocarditis, rhabdomyolysis, large pericardial effusion s/p pericardial window (9/26)  and HFrEF (improved to 35-40%) presenting for fever    #Sepsis, POA  #suspected GNR Pneumonia vs. atelectasis  #Recent hx of myocarditis and pericardial effusion s/p pericardial window  -Febrile to 102.4 in ED  -CT Chest w IVC 10/01: Increasing opacity in the left lung base and lingula. Although this may reflect atelectasis, superimposed infectious process is difficult to exclude. Resolved pericardial effusion. Resolved right pleural effusion; Markedly decreased left pleural effusion  -Rheum workup was negative on recent admission  -ID consult appreciated - monitor off abx  -incentive spirometer  -sending fungitel, procal, ferritin, cpk and cultures today  -repeat labs in am     #Transaminitis - slightly improved  - repeat LFTs in am    #Hyponatremia  - stable on IVF   - repeat sodium in am    Progress Note Handoff  Pending Consults: ID follow up  Pending Tests: labs, cultures  Pending Results: labs cultures  Family Discussion: discussed incentive spirometer, fever and overall plan of care with pt and medical staff. Remains acute   Disposition: Home__x___/SNF______/Other_____/Unknown at this time_____  Spent over 35 min reviewing chart and on coordinating patient care during interdisciplinary rounds     Please call me with any questions at extension [phone]

## 2022-10-02 NOTE — PROVIDER CONTACT NOTE (OTHER) - ASSESSMENT
Pt denies chest pain, arm pain, SOB, palpitations, and chills. On RA O@ sat 97%. No difficulty breathing/respiratory distress.

## 2022-10-03 LAB
ALBUMIN SERPL ELPH-MCNC: 3 G/DL — LOW (ref 3.5–5.2)
ALP SERPL-CCNC: 57 U/L — SIGNIFICANT CHANGE UP (ref 30–115)
ALT FLD-CCNC: 166 U/L — HIGH (ref 0–41)
ANION GAP SERPL CALC-SCNC: 8 MMOL/L — SIGNIFICANT CHANGE UP (ref 7–14)
AST SERPL-CCNC: 129 U/L — HIGH (ref 0–41)
BILIRUB SERPL-MCNC: 0.3 MG/DL — SIGNIFICANT CHANGE UP (ref 0.2–1.2)
BUN SERPL-MCNC: 11 MG/DL — SIGNIFICANT CHANGE UP (ref 10–20)
CALCIUM SERPL-MCNC: 7.7 MG/DL — LOW (ref 8.4–10.5)
CHLORIDE SERPL-SCNC: 102 MMOL/L — SIGNIFICANT CHANGE UP (ref 98–110)
CO2 SERPL-SCNC: 23 MMOL/L — SIGNIFICANT CHANGE UP (ref 17–32)
CREAT SERPL-MCNC: 0.5 MG/DL — LOW (ref 0.7–1.5)
EGFR: 149 ML/MIN/1.73M2 — SIGNIFICANT CHANGE UP
FERRITIN SERPL-MCNC: 564 NG/ML — HIGH (ref 30–400)
GLUCOSE SERPL-MCNC: 95 MG/DL — SIGNIFICANT CHANGE UP (ref 70–99)
HCT VFR BLD CALC: 35.3 % — LOW (ref 42–52)
HGB BLD-MCNC: 12 G/DL — LOW (ref 14–18)
LEGIONELLA AG UR QL: NEGATIVE — SIGNIFICANT CHANGE UP
MAGNESIUM SERPL-MCNC: 2.1 MG/DL — SIGNIFICANT CHANGE UP (ref 1.8–2.4)
MCHC RBC-ENTMCNC: 28 PG — SIGNIFICANT CHANGE UP (ref 27–31)
MCHC RBC-ENTMCNC: 34 G/DL — SIGNIFICANT CHANGE UP (ref 32–37)
MCV RBC AUTO: 82.5 FL — SIGNIFICANT CHANGE UP (ref 80–94)
NRBC # BLD: 0 /100 WBCS — SIGNIFICANT CHANGE UP (ref 0–0)
PLATELET # BLD AUTO: 191 K/UL — SIGNIFICANT CHANGE UP (ref 130–400)
POTASSIUM SERPL-MCNC: 4.6 MMOL/L — SIGNIFICANT CHANGE UP (ref 3.5–5)
POTASSIUM SERPL-SCNC: 4.6 MMOL/L — SIGNIFICANT CHANGE UP (ref 3.5–5)
PROCALCITONIN SERPL-MCNC: 0.18 NG/ML — HIGH (ref 0.02–0.1)
PROT SERPL-MCNC: 5.3 G/DL — LOW (ref 6–8)
RBC # BLD: 4.28 M/UL — LOW (ref 4.7–6.1)
RBC # FLD: 14.1 % — SIGNIFICANT CHANGE UP (ref 11.5–14.5)
S PNEUM AG UR QL: NEGATIVE — SIGNIFICANT CHANGE UP
SODIUM SERPL-SCNC: 133 MMOL/L — LOW (ref 135–146)
WBC # BLD: 5.79 K/UL — SIGNIFICANT CHANGE UP (ref 4.8–10.8)
WBC # FLD AUTO: 5.79 K/UL — SIGNIFICANT CHANGE UP (ref 4.8–10.8)

## 2022-10-03 PROCEDURE — 99254 IP/OBS CNSLTJ NEW/EST MOD 60: CPT

## 2022-10-03 PROCEDURE — 99233 SBSQ HOSP IP/OBS HIGH 50: CPT

## 2022-10-03 RX ADMIN — Medication 25 MILLIGRAM(S): at 06:34

## 2022-10-03 RX ADMIN — SODIUM CHLORIDE 100 MILLILITER(S): 9 INJECTION INTRAMUSCULAR; INTRAVENOUS; SUBCUTANEOUS at 23:27

## 2022-10-03 RX ADMIN — ENOXAPARIN SODIUM 40 MILLIGRAM(S): 100 INJECTION SUBCUTANEOUS at 06:33

## 2022-10-03 RX ADMIN — Medication 650 MILLIGRAM(S): at 06:33

## 2022-10-03 NOTE — CONSULT NOTE ADULT - ATTENDING COMMENTS
21 y/o man with recent admission in 9/2022 for myocarditis with fever and R facial swelling and lymphadenopathy re-admitted with several hours of fevers and chills, rheumatology consulted for persistently elevated CK and recurrence of fevers. During his last admission, pt was noted to have an elevated CK to a peak of more than 32,000. He did not have symptoms or exam findings concerning for inflammatory myopathy, and had a rheum workup which was negative. Pt's CK elevation was thought to be secondary to rhabdomyolysis, possibly secondary to infection? Pt currently denies any focal weakness (says he has had some generalized weakness but it has not interfered with ADLs), difficulty swallowing, joint pain, swelling anywhere, muscle pain, rashes, oral ulcers or Raynaud's. His exam today demonstrates 5/5 MS throughout his upper and lower extremities and in his neck flexor and extensor muscles. His CK on this admission was approx 5800, which is slightly downtrended from the last CK from pt's prior admission. Pt was also noted to have elevated AST and ALT, which may also be related to his elevated CK in the absence of an elevated alk phos. In general, pt's current findings are most suggestive of an infectious process with secondary rhabdomyolysis in the setting of fevers, possibly a viral infection (such as Coxsackie virus). Low suspicion for inflammatory myopathy or an autoimmune reaction based on pt's symptoms.  - Please send Coxsackie B antibodies, parvovirus IgG/IgM, as well as myomarker panel

## 2022-10-03 NOTE — PROGRESS NOTE ADULT - ASSESSMENT
· Assessment	  21yo M Romanian-speaking with recent admission hx of sepsis c/b myocarditis, rhabdomyolysis, large pericardial effusion s/p pericardial window (9/26)  and HFrEF (improved to 35-40%) presenting for fever    #Sepsis, POA  #suspected GNR Pneumonia vs. atelectasis  #Recent hx of myocarditis and pericardial effusion s/p pericardial window  -Febrile to 102.4 in ED, febrile overnight T max 101.7  -CT Chest w IVC 10/01: Increasing opacity in the left lung base and lingula. Although this may reflect atelectasis, superimposed infectious process is difficult to exclude. Resolved pericardial effusion. Resolved right pleural effusion; Markedly decreased left pleural effusion  -Rheum workup was negative on recent admission, rheum reconsulted today further recommendations given persistent fevers + elevated CK, f/u any further recs for w/u  -ID consult appreciated - abx dc'd yesterday, febrile ON, will f/u ID further recs  -incentive spirometer  -Pending repeat culture results, fungitell  -Continue to trend CK (6400 -> 5800 past 2 days)  -repeat labs in am     #Transaminitis - slightly improved  - repeat LFTs in am    #Hyponatremia  - stable on IVF Na+ 133 today

## 2022-10-03 NOTE — PROGRESS NOTE ADULT - ASSESSMENT
21yo M Greek-speaking with recent admission hx of sepsis c/b myocarditis, rhabdomyolysis, large pericardial effusion s/p pericardial window (9/26)  and HFrEF (improved to 35-40%) presenting for fever    #Sepsis, POA  #suspected GNR Pneumonia vs. atelectasis  #Recent hx of myocarditis and pericardial effusion s/p pericardial window  -Febrile to 102.4 in ED  -CT Chest w IVC 10/01: Increasing opacity in the left lung base and lingula. Although this may reflect atelectasis, superimposed infectious process is difficult to exclude. Resolved pericardial effusion. Resolved right pleural effusion; Markedly decreased left pleural effusion  -Rheum workup was negative on recent admission  -ID consult appreciated - monitor off abx  -incentive spirometer  -sending fungitel, procal, ferritin, cpk and cultures today  -repeat labs in am     #Transaminitis - slightly improved  - repeat LFTs in am    #Hyponatremia  - stable on IVF   - repeat sodium in am    Progress Note Handoff  Pending Consults: ID follow up  Pending Tests: labs, cultures  Pending Results: labs cultures  Family Discussion: discussed incentive spirometer, fever and overall plan of care with pt and medical staff. Remains acute   Disposition: Home__x___/SNF______/Other_____/Unknown at this time_____  Spent over 35 min reviewing chart and on coordinating patient care during interdisciplinary rounds     Please call me with any questions at extension [phone] 21yo M Faroese-speaking with recent admission hx of sepsis c/b myocarditis, rhabdomyolysis, large pericardial effusion s/p pericardial window (9/26)  and HFrEF (improved to 35-40%) presenting for fever    #Sepsis, POA  #suspected GNR Pneumonia vs. atelectasis  #Recent hx of myocarditis and pericardial effusion s/p pericardial window  -Febrile to 102.4 in ED  -CT Chest w IVC 10/01: Increasing opacity in the left lung base and lingula. Although this may reflect atelectasis, superimposed infectious process is difficult to exclude. Resolved pericardial effusion. Resolved right pleural effusion; Markedly decreased left pleural effusion  -Rheum workup was negative on recent admission  -ID consult appreciated - monitor off abx  -incentive spirometer  -sent fungitel, procal, ferritin, cpk and cultures   -repeat labs in am   -Rheum consult ordered    #Transaminitis - slightly improved  - repeat LFTs in am    #Hyponatremia - improving  - stable on IVF   - repeat sodium in am    Progress Note Handoff  Pending Consults: ID follow up, rheum  Pending Tests: labs, cultures  Pending Results: labs cultures  Family Discussion: discussed incentive spirometer, continued fevers, blood work and overall plan of care with pt and medical staff. Remains acute.  Disposition: Home__x___/SNF______/Other_____/Unknown at this time_____  Spent over 35 min reviewing chart and on coordinating patient care during interdisciplinary rounds     Please call me with any questions at extension [phone]

## 2022-10-03 NOTE — PROGRESS NOTE ADULT - SUBJECTIVE AND OBJECTIVE BOX
DEMETRIA LAZCANO 22y Male  MRN#: [id number]   Hospital Day: 2d    SUBJECTIVE  Patient is a 22y old Male who presents with a chief complaint of fever, hyponatremia, elevated troponins (02 Oct 2022 13:59)  Currently admitted to medicine with the primary diagnosis of Fever      INTERVAL HPI AND OVERNIGHT EVENTS:  Patient was examined and seen at bedside. This morning he is resting comfortably in bed and reports fever and chills over night (T max 101.7), ABX were discontinued yesterday. Slight tachycardia 106, BP stable. No cp/sob/n/v/d/pain or swelling in extremities. No other events overnight, pending repeat BCx results and fungitell.     REVIEW OF SYMPTOMS:  Relevant ROS per above, remainder negative.    OBJECTIVE  PAST MEDICAL & SURGICAL HISTORY  No pertinent past medical history    No significant past surgical history      ALLERGIES:  No Known Allergies    MEDICATIONS:  STANDING MEDICATIONS  enoxaparin Injectable 40 milliGRAM(s) SubCutaneous every 24 hours  metoprolol succinate ER 25 milliGRAM(s) Oral daily  sodium chloride 0.9%. 1000 milliLiter(s) IV Continuous <Continuous>    PRN MEDICATIONS  acetaminophen     Tablet .. 650 milliGRAM(s) Oral every 6 hours PRN  aluminum hydroxide/magnesium hydroxide/simethicone Suspension 30 milliLiter(s) Oral every 4 hours PRN  melatonin 3 milliGRAM(s) Oral at bedtime PRN  ondansetron Injectable 4 milliGRAM(s) IV Push every 8 hours PRN      VITAL SIGNS: Last 24 Hours  T(C): 37.9 (03 Oct 2022 04:55), Max: 38.8 (02 Oct 2022 15:49)  T(F): 100.3 (03 Oct 2022 04:55), Max: 101.8 (02 Oct 2022 15:49)  HR: 106 (03 Oct 2022 04:55) (105 - 110)  BP: 121/56 (03 Oct 2022 04:55) (115/55 - 121/56)  BP(mean): --  RR: 18 (03 Oct 2022 04:55) (18 - 18)  SpO2: --    LABS:                        12.0   5.79  )-----------( 191      ( 03 Oct 2022 06:29 )             35.3     10-03    133<L>  |  102  |  11  ----------------------------<  95  4.6   |  23  |  0.5<L>    Ca    7.7<L>      03 Oct 2022 06:29  Mg     2.1     10-03    TPro  5.3<L>  /  Alb  3.0<L>  /  TBili  0.3  /  DBili  x   /  AST  129<H>  /  ALT  166<H>  /  AlkPhos  57  10-03          Creatine Kinase, Serum: 5855 U/L *H* (10-02-22 @ 17:08)      Culture - Blood (collected 01 Oct 2022 05:33)  Source: .Blood None  Preliminary Report (02 Oct 2022 14:01):    No growth to date.    Culture - Urine (collected 01 Oct 2022 01:10)  Source: Clean Catch Clean Catch (Midstream)  Final Report (02 Oct 2022 08:28):    No growth    Culture - Blood (collected 30 Sep 2022 22:45)  Source: .Blood Blood-Peripheral  Preliminary Report (02 Oct 2022 09:02):    No growth to date.      CARDIAC MARKERS ( 02 Oct 2022 17:08 )  x     / x     / 5855 U/L / x     / x      CARDIAC MARKERS ( 01 Oct 2022 16:51 )  x     / 0.11 ng/mL / x     / x     / x          RADIOLOGY:      PHYSICAL EXAM:  CONSTITUTIONAL: No acute distress, well-developed, well-groomed, AAOx3  HEAD: Atraumatic, normocephalic  EYES: EOM intact, conjunctiva and sclera clear, no icterus  ENT: Supple, no JVD; moist mucous membranes  PULMONARY: Clear to auscultation bilaterally; no wheezes, rales, or rhonchi, normal respiratory effort  CARDIOVASCULAR: Regular rate and rhythm; no murmurs, rubs, or gallops  GASTROINTESTINAL: Soft, non-tender, non-distended; bowel sounds present  MUSCULOSKELETAL: 2+ peripheral pulses; no clubbing, no cyanosis, no edema  NEUROLOGY: non-focal, moving all extremities  SKIN: No rashes or lesions; warm and dry

## 2022-10-03 NOTE — HISTORY OF PRESENT ILLNESS
[FreeTextEntry1] : 21 yo M with no significant PMHx presents to the ED c/o R sided lower dental pain x 1 week with development of fever/diffuse myalgias x 3 days. Over the past few days, his dental pain has gotten worse. He also endorses having fever as high as 103.  In the ED, CT showed enlarged right submandibular and right anterior upper jugular lymph nodes and associated adjacent soft tissue changes without drainable fluid collection.  ECHO showed large pericardial effusion in which pericardial window with drain place was done. Drain removed on POD#4. Patient rheumatological work up was negative. 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 His EF was 20% prior to drainage, \par repeat echo showed his EF improved to 40%. Dr Watson from Heart Failure was consulted. Pt clear to be discharged with medical management as an outpatient. Patient discharged home in stable condition on POD#4 with instruction to follow up with DR Watson in 2 weeks. Arrives today for follow up. \par

## 2022-10-03 NOTE — ASSESSMENT
[FreeTextEntry1] : 23 yo M with no significant PMHx presented to ED, found to have large pericardial effusion in which pericardial window with drain place was done. Pt arrives today for follow up. \par \par \par

## 2022-10-03 NOTE — CONSULT NOTE ADULT - ASSESSMENT
#Recent hx of Rhabdomyolysis with elevated CK, myopericarditis, cardiac tamponade s/p pericardial window now presenting with fever, chills     -Uncertain etiology of patients clinical presentation. He has laboratory findings and symptoms of muscle pain concerning for rhabdomyolysis (elevated CPK which is now downtrending). He has no muscle weakness on physical exam that is usually seen with inflammatory myositis. Fevers coud be from waxing and waning viral infection   -His TTE showed cardiac tamponade with EF 20% repeat TTE EF 35-40% s/p pericardial window 9/24 --> fluid and tissue cultures negative  -bx showing tissue lined by mesothelium showing mild congestion.   -Autoimmune labs negative  -Trend CK  -Consider repeating echo  -Obtain myositis antibody panel (myomarker panel 3 on sunrise), Coxsackie B, parvovirus

## 2022-10-03 NOTE — PROGRESS NOTE ADULT - SUBJECTIVE AND OBJECTIVE BOX
DEMETRIA LAZCANO  22y  Male      Patient is a 22y old  Male who presents with fever, hyponatremia, elevated troponins (01 Oct 2022 12:01)      INTERVAL HPI/OVERNIGHT EVENTS:  Patient seen and examined earlier this morning  lying comfortably in bed  in nad  using incentive spirometer  denies any complaints       REVIEW OF SYSTEMS:  CONSTITUTIONAL: No fever, weight loss, or fatigue  EYES: No eye pain, visual disturbances, or discharge  ENMT:  No difficulty hearing, tinnitus, vertigo; No sinus or throat pain  NECK: No pain or stiffness  RESPIRATORY: No cough, wheezing, chills or hemoptysis; No shortness of breath  CARDIOVASCULAR: No chest pain, palpitations, dizziness, or leg swelling  GASTROINTESTINAL: No abdominal or epigastric pain. No nausea, vomiting, or hematemesis; No diarrhea or constipation. No melena or hematochezia.  GENITOURINARY: No dysuria, frequency, hematuria, or incontinence  NEUROLOGICAL: No headaches, memory loss, loss of strength, numbness, or tremors  SKIN: No itching, burning, rashes, or lesions   LYMPH NODES: No enlarged glands  ENDOCRINE: No heat or cold intolerance; No hair loss  MUSCULOSKELETAL: No joint pain or swelling; No muscle, back, or extremity pain  PSYCHIATRIC: No depression, anxiety, mood swings, or difficulty sleeping  HEME/LYMPH: No easy bruising, or bleeding gums  ALLERY AND IMMUNOLOGIC: No hives or eczema    T(C): 37.2 (10-02-22 @ 12:07), Max: 39.2 (10-01-22 @ 16:02)  HR: 101 (10-02-22 @ 08:00) (95 - 117)  BP: 100/57 (10-02-22 @ 08:00) (100/57 - 118/58)  RR: 18 (10-02-22 @ 08:00) (18 - 18)  SpO2: 96% (10-02-22 @ 08:00) (96% - 97%) on ra    PHYSICAL EXAM:  GENERAL: NAD, well-groomed, well-developed  HEAD:  Atraumatic, Normocephalic  EYES: EOMI, PERRLA, conjunctiva and sclera clear  ENMT: No tonsillar erythema, exudates, or enlargement; Moist mucous membranes, Good dentition, No lesions  NECK: Supple, No JVD, Normal thyroid  NERVOUS SYSTEM:  Alert & Oriented X3, Good concentration; Moves all extremities   CHEST/LUNG: decreased breath sounds at bases   HEART: Regular rate and rhythm; No murmurs, rubs, or gallops  ABDOMEN: Soft, Nontender, Nondistended; Bowel sounds present  EXTREMITIES:  2+ Peripheral Pulses, No clubbing, cyanosis, or edema  LYMPH: No lymphadenopathy noted  SKIN: No rashes or lesions    Consultant(s) Notes Reviewed:  [x ] YES  [ ] NO  Care Discussed with Consultants/Other Providers [ x] YES  [ ] NO    LAB:                        13.8   7.88  )-----------( 230      ( 02 Oct 2022 10:00 )             40.4     10-02    129<L>  |  95<L>  |  13  ----------------------------<  88  4.6   |  22  |  0.5<L>    Ca    7.9<L>      02 Oct 2022 09:55  Mg     2.3     10-    TPro  5.6<L>  /  Alb  3.2<L>  /  TBili  0.5  /  DBili  x   /  AST  149<H>  /  ALT  194<H>  /  AlkPhos  62  10-02    LIVER FUNCTIONS - ( 02 Oct 2022 09:55 )  Alb: 3.2 g/dL / Pro: 5.6 g/dL / ALK PHOS: 62 U/L / ALT: 194 U/L / AST: 149 U/L / GGT: x           CARDIAC MARKERS ( 01 Oct 2022 16:51 )  x     / 0.11 ng/mL / x     / x     / x      CARDIAC MARKERS ( 01 Oct 2022 12:41 )  x     / 0.11 ng/mL / 6457 U/L / x     / x      CARDIAC MARKERS ( 30 Sep 2022 21:25 )  x     / 0.16 ng/mL / x     / x     / x          Drug Dosing Weight  Height (cm): 160 (02 Oct 2022 00:14)  Weight (kg): 65.8 (01 Oct 2022 21:00)  BMI (kg/m2): 25.7 (02 Oct 2022 00:14)  BSA (m2): 1.69 (02 Oct 2022 00:14)    I&O's Summary    02 Oct 2022 07:01  -  02 Oct 2022 13:59  --------------------------------------------------------  IN: 0 mL / OUT: 350 mL / NET: -350 mL      Urinalysis Basic - ( 01 Oct 2022 01:10 )    Color: Light Yellow / Appearance: Clear / S.013 / pH: x  Gluc: x / Ketone: Negative  / Bili: Negative / Urobili: <2 mg/dL   Blood: x / Protein: Trace / Nitrite: Negative   Leuk Esterase: Negative / RBC: 0 /HPF / WBC 0 /HPF   Sq Epi: x / Non Sq Epi: 0 /HPF / Bacteria: Negative        RADIOLOGY & ADDITIONAL TESTS:  Imaging Personally Reviewed:  [x] YES  [ ] NO  < from: CT Chest w/ IV Cont (10.01.22 @ 06:33) >  IMPRESSION:      Resolved pericardial effusion    Resolved right pleural effusion    Markedly decreased left pleural effusion.    Increasing opacity in the left lung base and lingula. Although this may   reflect atelectasis, nevertheless, superimposed infectious processis   difficult to exclude.    < end of copied text >      MEDS:  acetaminophen     Tablet .. 650 milliGRAM(s) Oral every 6 hours PRN  aluminum hydroxide/magnesium hydroxide/simethicone Suspension 30 milliLiter(s) Oral every 4 hours PRN  enoxaparin Injectable 40 milliGRAM(s) SubCutaneous every 24 hours  melatonin 3 milliGRAM(s) Oral at bedtime PRN  metoprolol succinate ER 25 milliGRAM(s) Oral daily  ondansetron Injectable 4 milliGRAM(s) IV Push every 8 hours PRN  sodium chloride 0.9%. 1000 milliLiter(s) IV Continuous <Continuous>       DEMETRIA LAZCANO  22y  Male      Patient is a 22y old Male who presents with fever, hyponatremia, elevated troponins (01 Oct 2022 12:01)      INTERVAL HPI/OVERNIGHT EVENTS:  Patient seen and examined earlier this morning  lying comfortably in bed  in nad  using incentive spirometer  denies any complaints   femains febrile       REVIEW OF SYSTEMS:  CONSTITUTIONAL: No fever, weight loss, or fatigue  EYES: No eye pain, visual disturbances, or discharge  ENMT:  No difficulty hearing, tinnitus, vertigo; No sinus or throat pain  NECK: No pain or stiffness  RESPIRATORY: No cough, wheezing, chills or hemoptysis; No shortness of breath  CARDIOVASCULAR: No chest pain, palpitations, dizziness, or leg swelling  GASTROINTESTINAL: No abdominal or epigastric pain. No nausea, vomiting, or hematemesis; No diarrhea or constipation. No melena or hematochezia.  GENITOURINARY: No dysuria, frequency, hematuria, or incontinence  NEUROLOGICAL: No headaches, memory loss, loss of strength, numbness, or tremors  SKIN: No itching, burning, rashes, or lesions   LYMPH NODES: No enlarged glands  ENDOCRINE: No heat or cold intolerance; No hair loss  MUSCULOSKELETAL: No joint pain or swelling; No muscle, back, or extremity pain  PSYCHIATRIC: No depression, anxiety, mood swings, or difficulty sleeping  HEME/LYMPH: No easy bruising, or bleeding gums  ALLERY AND IMMUNOLOGIC: No hives or eczema    Vital Signs Last 24 Hrs  T(C): 36.1 (03 Oct 2022 13:27), Max: 38.8 (02 Oct 2022 15:49)  T(F): 97 (03 Oct 2022 13:27), Max: 101.8 (02 Oct 2022 15:49)  HR: 98 (03 Oct 2022 13:27) (98 - 110)  BP: 120/59 (03 Oct 2022 13:27) (115/55 - 121/56)  BP(mean): --  RR: 18 (03 Oct 2022 13:27) (18 - 18)  SpO2: 98% (03 Oct 2022 13:27) (98% - 98%)    Parameters below as of 03 Oct 2022 13:27  Patient On (Oxygen Delivery Method): room air      PHYSICAL EXAM:  GENERAL: NAD, well-groomed, well-developed  HEAD:  Atraumatic, Normocephalic  EYES: EOMI, PERRLA, conjunctiva and sclera clear  ENMT: No tonsillar erythema, exudates, or enlargement; Moist mucous membranes, Good dentition, No lesions  NECK: Supple, No JVD, Normal thyroid  NERVOUS SYSTEM:  Alert & Oriented X3, Good concentration; Moves all extremities   CHEST/LUNG: decreased breath sounds at bases   HEART: Regular rate and rhythm; No murmurs, rubs, or gallops  ABDOMEN: Soft, Nontender, Nondistended; Bowel sounds present  EXTREMITIES:  2+ Peripheral Pulses, No clubbing, cyanosis, or edema  LYMPH: No lymphadenopathy noted  SKIN: No rashes or lesions    Consultant(s) Notes Reviewed:  [x ] YES  [ ] NO  Care Discussed with Consultants/Other Providers [ x] YES  [ ] NO    LAB:                          12.0   5.79  )-----------( 191      ( 03 Oct 2022 06:29 )             35.3     10-03    133<L>  |  102  |  11  ----------------------------<  95  4.6   |  23  |  0.5<L>    Ca    7.7<L>      03 Oct 2022 06:29  Mg     2.1     10-    TPro  5.3<L>  /  Alb  3.0<L>  /  TBili  0.3  /  DBili  x   /  AST  129<H>  /  ALT  166<H>  /  AlkPhos  57  10-03      Drug Dosing Weight  Height (cm): 160 (02 Oct 2022 00:14)  Weight (kg): 65.8 (01 Oct 2022 21:00)  BMI (kg/m2): 25.7 (02 Oct 2022 00:14)  BSA (m2): 1.69 (02 Oct 2022 00:14)      Urinalysis Basic - ( 01 Oct 2022 01:10 )    Color: Light Yellow / Appearance: Clear / S.013 / pH: x  Gluc: x / Ketone: Negative  / Bili: Negative / Urobili: <2 mg/dL   Blood: x / Protein: Trace / Nitrite: Negative   Leuk Esterase: Negative / RBC: 0 /HPF / WBC 0 /HPF   Sq Epi: x / Non Sq Epi: 0 /HPF / Bacteria: Negative        RADIOLOGY & ADDITIONAL TESTS:  Imaging Personally Reviewed:  [x] YES  [ ] NO  < from: CT Chest w/ IV Cont (10.01.22 @ 06:33) >  IMPRESSION:      Resolved pericardial effusion    Resolved right pleural effusion    Markedly decreased left pleural effusion.    Increasing opacity in the left lung base and lingula. Although this may   reflect atelectasis, nevertheless, superimposed infectious processis   difficult to exclude.    < end of copied text >      MEDICATIONS  (STANDING):  enoxaparin Injectable 40 milliGRAM(s) SubCutaneous every 24 hours  metoprolol succinate ER 25 milliGRAM(s) Oral daily  sodium chloride 0.9%. 1000 milliLiter(s) (100 mL/Hr) IV Continuous <Continuous>    MEDICATIONS  (PRN):  acetaminophen     Tablet .. 650 milliGRAM(s) Oral every 6 hours PRN Temp greater or equal to 38C (100.4F), Mild Pain (1 - 3)  aluminum hydroxide/magnesium hydroxide/simethicone Suspension 30 milliLiter(s) Oral every 4 hours PRN Dyspepsia  melatonin 3 milliGRAM(s) Oral at bedtime PRN Insomnia  ondansetron Injectable 4 milliGRAM(s) IV Push every 8 hours PRN Nausea and/or Vomiting

## 2022-10-03 NOTE — CONSULT NOTE ADULT - SUBJECTIVE AND OBJECTIVE BOX
HPI:  23yo M Yi-speaking with recent admission hx of sepsis c/b  myocarditis, rhabdomyolysis, large pericardial effusion s/p pericardial window (9/26)  and HFrEF (improved to 35-40%) presenting for fever. Fever started this morning associated with L heel pain that resolves with walking around, otherwise no complaints. Patient denies any trauma injury to leg. He denies any chest pain, sob, cough since previous discharge (9/28). He denies pain at site of pericardiocentesis.   To note he has stayed at home since discharge and started feeling tired and lethargic with fever and chills this AM.     ROS   Positive fever, chills, leg pain   Negative nausea, vomiting, chest pain, abdominal pain, sob, cough, constipation, diarrhea, incontinence     ED Vital Signs Last 24 Hrs  T(C): 36.1 (01 Oct 2022 01:39), Max: 39.1 (30 Sep 2022 22:55)  T(F): 97 (01 Oct 2022 01:39), Max: 102.4 (30 Sep 2022 22:55)  HR: 93 (01 Oct 2022 01:39) (91 - 122)  BP: 114/61 (01 Oct 2022 01:39) (109/60 - 114/61)  RR: 20 (01 Oct 2022 01:39) (20 - 20)  SpO2: 98% (01 Oct 2022 01:39) (97% - 99%)  O2 Parameters below as of 01 Oct 2022 01:39  Patient On (Oxygen Delivery Method): room air    Labs significant for Na 126 - K+ 5.2 -  -  - trop 0.16 - BNP 1433  UA neg - RVP neg   CXR no acute changes   TTE done on 9/26 EF 35-40%   In the ED given vanco -                          12.0   5.79  )-----------( 191      ( 03 Oct 2022 06:29 )             35.3       10-03    133<L>  |  102  |  11  ----------------------------<  95  4.6   |  23  |  0.5<L>    Ca    7.7<L>      03 Oct 2022 06:29  Mg     2.1     10-03    TPro  5.3<L>  /  Alb  3.0<L>  /  TBili  0.3  /  DBili  x   /  AST  129<H>  /  ALT  166<H>  /  AlkPhos  57  10-03          Lactate Trend      CARDIAC MARKERS ( 02 Oct 2022 17:08 )  x     / x     / 5855 U/L / x     / x      CARDIAC MARKERS ( 01 Oct 2022 16:51 )  x     / 0.11 ng/mL / x     / x     / x            CAPILLARY BLOOD GLUCOSE            Culture Results:   No growth to date. (10-01 @ 05:33)        General: adult in NAD  HEENT: clear oropharynx, anicteric sclera, pink conjunctiva  Neck: supple  CV: normal S1/S2 with no murmur rubs or gallops  Lungs: positive air movement b/l  lungs no wheezes, no rales  Abdomen: soft non-tender non-distended, no hepatosplenomegaly  Ext: no clubbing cyanosis or edema  Musculoskeletal: No joint swelling or joint tenderness in hands or feet. Full range of motion in legs. No tenderness to palpation bilateral lower extremities  Skin: no rashes and no petechiae, no shawl signs, gottron papules, heliotrope rash, periugual erytheam  Neuro: alert and oriented X 4, no focal deficits. 5/5 muscle strength in bilateral proximal lower extremities hip flexion, distally knee flexion and extension, toe extension

## 2022-10-04 ENCOUNTER — APPOINTMENT (OUTPATIENT)
Dept: CARDIOTHORACIC SURGERY | Facility: CLINIC | Age: 22
End: 2022-10-04

## 2022-10-04 LAB
ALBUMIN SERPL ELPH-MCNC: 3.4 G/DL — LOW (ref 3.5–5.2)
ALP SERPL-CCNC: 65 U/L — SIGNIFICANT CHANGE UP (ref 30–115)
ALT FLD-CCNC: 168 U/L — HIGH (ref 0–41)
ANION GAP SERPL CALC-SCNC: 8 MMOL/L — SIGNIFICANT CHANGE UP (ref 7–14)
AST SERPL-CCNC: 124 U/L — HIGH (ref 0–41)
B19V IGG SER-ACNC: 1.97 INDEX — HIGH (ref 0–0.9)
B19V IGG+IGM SER-IMP: POSITIVE
B19V IGG+IGM SER-IMP: SIGNIFICANT CHANGE UP
B19V IGM FLD-ACNC: 0.1 INDEX — SIGNIFICANT CHANGE UP (ref 0–0.9)
B19V IGM SER-ACNC: NEGATIVE — SIGNIFICANT CHANGE UP
BASOPHILS # BLD AUTO: 0.02 K/UL — SIGNIFICANT CHANGE UP (ref 0–0.2)
BASOPHILS NFR BLD AUTO: 0.5 % — SIGNIFICANT CHANGE UP (ref 0–1)
BILIRUB SERPL-MCNC: 0.3 MG/DL — SIGNIFICANT CHANGE UP (ref 0.2–1.2)
BUN SERPL-MCNC: 9 MG/DL — LOW (ref 10–20)
CALCIUM SERPL-MCNC: 8.1 MG/DL — LOW (ref 8.4–10.5)
CHLORIDE SERPL-SCNC: 105 MMOL/L — SIGNIFICANT CHANGE UP (ref 98–110)
CO2 SERPL-SCNC: 25 MMOL/L — SIGNIFICANT CHANGE UP (ref 17–32)
CREAT SERPL-MCNC: <0.5 MG/DL — LOW (ref 0.7–1.5)
EGFR: 159 ML/MIN/1.73M2 — SIGNIFICANT CHANGE UP
EOSINOPHIL # BLD AUTO: 0.31 K/UL — SIGNIFICANT CHANGE UP (ref 0–0.7)
EOSINOPHIL NFR BLD AUTO: 7.3 % — SIGNIFICANT CHANGE UP (ref 0–8)
GLUCOSE SERPL-MCNC: 91 MG/DL — SIGNIFICANT CHANGE UP (ref 70–99)
HCT VFR BLD CALC: 34.7 % — LOW (ref 42–52)
HGB BLD-MCNC: 11.6 G/DL — LOW (ref 14–18)
IMM GRANULOCYTES NFR BLD AUTO: 0.2 % — SIGNIFICANT CHANGE UP (ref 0.1–0.3)
LYMPHOCYTES # BLD AUTO: 1.71 K/UL — SIGNIFICANT CHANGE UP (ref 1.2–3.4)
LYMPHOCYTES # BLD AUTO: 40.4 % — SIGNIFICANT CHANGE UP (ref 20.5–51.1)
MCHC RBC-ENTMCNC: 27 PG — SIGNIFICANT CHANGE UP (ref 27–31)
MCHC RBC-ENTMCNC: 33.4 G/DL — SIGNIFICANT CHANGE UP (ref 32–37)
MCV RBC AUTO: 80.7 FL — SIGNIFICANT CHANGE UP (ref 80–94)
MONOCYTES # BLD AUTO: 0.29 K/UL — SIGNIFICANT CHANGE UP (ref 0.1–0.6)
MONOCYTES NFR BLD AUTO: 6.9 % — SIGNIFICANT CHANGE UP (ref 1.7–9.3)
NEUTROPHILS # BLD AUTO: 1.89 K/UL — SIGNIFICANT CHANGE UP (ref 1.4–6.5)
NEUTROPHILS NFR BLD AUTO: 44.7 % — SIGNIFICANT CHANGE UP (ref 42.2–75.2)
NRBC # BLD: 0 /100 WBCS — SIGNIFICANT CHANGE UP (ref 0–0)
PLATELET # BLD AUTO: 233 K/UL — SIGNIFICANT CHANGE UP (ref 130–400)
POTASSIUM SERPL-MCNC: 4.2 MMOL/L — SIGNIFICANT CHANGE UP (ref 3.5–5)
POTASSIUM SERPL-SCNC: 4.2 MMOL/L — SIGNIFICANT CHANGE UP (ref 3.5–5)
PROT SERPL-MCNC: 5.5 G/DL — LOW (ref 6–8)
RBC # BLD: 4.3 M/UL — LOW (ref 4.7–6.1)
RBC # FLD: 13.5 % — SIGNIFICANT CHANGE UP (ref 11.5–14.5)
SODIUM SERPL-SCNC: 138 MMOL/L — SIGNIFICANT CHANGE UP (ref 135–146)
WBC # BLD: 4.23 K/UL — LOW (ref 4.8–10.8)
WBC # FLD AUTO: 4.23 K/UL — LOW (ref 4.8–10.8)

## 2022-10-04 PROCEDURE — 99233 SBSQ HOSP IP/OBS HIGH 50: CPT

## 2022-10-04 RX ADMIN — Medication 25 MILLIGRAM(S): at 06:10

## 2022-10-04 RX ADMIN — ENOXAPARIN SODIUM 40 MILLIGRAM(S): 100 INJECTION SUBCUTANEOUS at 06:10

## 2022-10-04 NOTE — PROGRESS NOTE ADULT - SUBJECTIVE AND OBJECTIVE BOX
DEMETRIA LAZCANO 22y Male  MRN#: [id number]   Hospital Day: 3d    SUBJECTIVE  Patient is a 22y old Male who presents with a chief complaint of fever, hyponatremia, elevated troponins (03 Oct 2022 16:01)  Currently admitted to medicine with the primary diagnosis of Fever      INTERVAL HPI AND OVERNIGHT EVENTS:  Patient was examined and seen at bedside. This morning he is resting comfortably in bed and reports no issues or overnight events. States he is feeling a lot better today. Denies fever/chills, weakness, nausea, sob, chest pain, abd pain. States he has been out of bed and ambulating without issue.     OBJECTIVE  PAST MEDICAL & SURGICAL HISTORY  No pertinent past medical history    No significant past surgical history      ALLERGIES:  No Known Allergies    MEDICATIONS:  STANDING MEDICATIONS  enoxaparin Injectable 40 milliGRAM(s) SubCutaneous every 24 hours  metoprolol succinate ER 25 milliGRAM(s) Oral daily  sodium chloride 0.9%. 1000 milliLiter(s) IV Continuous <Continuous>    PRN MEDICATIONS  acetaminophen     Tablet .. 650 milliGRAM(s) Oral every 6 hours PRN  aluminum hydroxide/magnesium hydroxide/simethicone Suspension 30 milliLiter(s) Oral every 4 hours PRN  melatonin 3 milliGRAM(s) Oral at bedtime PRN  ondansetron Injectable 4 milliGRAM(s) IV Push every 8 hours PRN      VITAL SIGNS: Last 24 Hours  T(C): 36.6 (04 Oct 2022 04:57), Max: 37.1 (03 Oct 2022 21:09)  T(F): 97.9 (04 Oct 2022 04:57), Max: 98.7 (03 Oct 2022 21:09)  HR: 87 (04 Oct 2022 04:57) (87 - 98)  BP: 111/56 (04 Oct 2022 04:57) (111/56 - 120/59)  BP(mean): --  RR: 18 (04 Oct 2022 04:57) (18 - 18)  SpO2: 100% (03 Oct 2022 21:09) (98% - 100%)    LABS:                        11.6   4.23  )-----------( 233      ( 04 Oct 2022 06:13 )             34.7     10-04    138  |  105  |  9<L>  ----------------------------<  91  4.2   |  25  |  <0.5<L>    Ca    8.1<L>      04 Oct 2022 06:13  Mg     2.1     10-03    TPro  5.5<L>  /  Alb  3.4<L>  /  TBili  0.3  /  DBili  x   /  AST  124<H>  /  ALT  168<H>  /  AlkPhos  65  10-04              Culture - Blood (collected 02 Oct 2022 17:08)  Source: .Blood None  Preliminary Report (04 Oct 2022 01:02):    No growth to date.    Culture - Blood (collected 02 Oct 2022 17:08)  Source: .Blood None  Preliminary Report (04 Oct 2022 01:02):    No growth to date.      CARDIAC MARKERS ( 02 Oct 2022 17:08 )  x     / x     / 5855 U/L / x     / x          RADIOLOGY:      PHYSICAL EXAM:  CONSTITUTIONAL: No acute distress, AAOx3  HEAD: Atraumatic, normocephalic  EYES: EOM intact, PERRLA, conjunctiva and sclera clear  ENT: moist mucous membranes  PULMONARY: Clear to auscultation bilaterally  CARDIOVASCULAR: Regular rate and rhythm  GASTROINTESTINAL: Soft, non-tender, non-distended; bowel sounds present  MUSCULOSKELETAL: no edema  NEUROLOGY: non-focal  SKIN: warm and dry

## 2022-10-04 NOTE — PROGRESS NOTE ADULT - ASSESSMENT
23yo M Belarusian-speaking with recent admission hx of sepsis c/b myocarditis, rhabdomyolysis, large pericardial effusion s/p pericardial window (9/26)  and HFrEF (improved to 35-40%) presenting for fever    #Sepsis, POA  #suspected GNR Pneumonia vs. atelectasis  #Recent hx of myocarditis and pericardial effusion s/p pericardial window  -Febrile to 102.4 in ED  -CT Chest w IVC 10/01: Increasing opacity in the left lung base and lingula. Although this may reflect atelectasis, superimposed infectious process is difficult to exclude. Resolved pericardial effusion. Resolved right pleural effusion; Markedly decreased left pleural effusion  -Rheum workup was negative on recent admission  -ID consult appreciated - monitor off abx  -incentive spirometer  -sent fungitel (pending)  -reviewed procal, ferritin, cpk   -follow up cultures   -repeat labs in am   -Rheum consult appreciated - workup sent    #Transaminitis - stable since yesterday  - repeat LFTs in am    #Hyponatremia - resolved  - s/p IVF    Progress Note Handoff  Pending Consults: ID follow up, rheum  Pending Tests: labs, cultures  Pending Results: labs cultures  Family Discussion: discussed improved fevers, blood work, consults and overall plan of care with pt and medical staff. Remains acute. Will be going home without any needs when medically stable. Anticipate d/c in 24-48hrs  Disposition: Home__x___/SNF______/Other_____/Unknown at this time_____  Spent over 35 min reviewing chart and on coordinating patient care during interdisciplinary rounds     Please call me with any questions at extension [phone]

## 2022-10-04 NOTE — PROGRESS NOTE ADULT - ASSESSMENT
ASSESSMENT & PLAN:  21yo M Malawian-speaking with recent admission hx of sepsis c/b myocarditis, rhabdomyolysis, large pericardial effusion s/p pericardial window (9/26)  and HFrEF (improved to 35-40%) presenting for fever    #Sepsis, POA  #suspected GNR Pneumonia vs. atelectasis  #Recent hx of myocarditis and pericardial effusion s/p pericardial window  - Febrile to 102.4 in ED, febrile overnight T max 101.7  - CT Chest w IVC 10/01: Increasing opacity in the left lung base and lingula. Although this may reflect atelectasis, superimposed infectious process is difficult to exclude. Resolved pericardial effusion. Resolved right pleural effusion; Markedly decreased left pleural effusion  - Rheum workup was negative on recent admission, rheum reconsulted   - abx dc'd  - incentive spirometer  - Pending repeat culture results, fungitell  - Continue to trend CK (6400 -> 5800 past 2 days)  - per rheum, pt's findings suggestive of a infectious process with secondary b  - f/u coxsackie B antibodies, parvovirus IgG/IgM, and myomarker panel  - f/u rheum/ID    #Transaminitis - slightly improved  - repeat LFTs remain elevated today    #Hyponatremia  - stable on IVF Na+ 133 today    #Misc  - DVT Prophylaxis: lovenox  - GI Prophylaxis: none  - Diet: regular  - Activity: AAT  - IV Fluids: NS 100cc/hr  - Code Status: full    Dispo: ASSESSMENT & PLAN:  23yo M Slovenian-speaking with recent admission hx of sepsis c/b myocarditis, rhabdomyolysis, large pericardial effusion s/p pericardial window (9/26)  and HFrEF (improved to 35-40%) presenting for fever    #Sepsis, POA  #suspected GNR Pneumonia vs. atelectasis  #Recent hx of myocarditis and pericardial effusion s/p pericardial window  - Febrile to 102.4 in ED, febrile overnight T max 101.7  - CT Chest w IVC 10/01: Increasing opacity in the left lung base and lingula. Although this may reflect atelectasis, superimposed infectious process is difficult to exclude. Resolved pericardial effusion. Resolved right pleural effusion; Markedly decreased left pleural effusion  - Rheum workup was negative on recent admission, rheum reconsulted   - abx dc'd  - incentive spirometer  - Pending repeat culture results, fungitell  - Continue to trend CK (6400 -> 5800 past 2 days)  - per rheum, pt's findings suggestive of a infectious process with secondary b  - f/u coxsackie B antibodies, parvovirus IgG/IgM, and myomarker panel  - f/u rheum  - pt afebrile for 24hrs; if spike another fever, panculture and recall ID  - continue to monitor    #Transaminitis - slightly improved  - repeat LFTs remain elevated today    #Hyponatremia  - stable on IVF Na+ 133 today    #Misc  - DVT Prophylaxis: lovenox  - GI Prophylaxis: none  - Diet: regular  - Activity: AAT  - IV Fluids: NS 100cc/hr  - Code Status: full

## 2022-10-04 NOTE — PROGRESS NOTE ADULT - SUBJECTIVE AND OBJECTIVE BOX
DEMETRIA LAZCANO  22y  Male      Patient is a 22y old Male who presents with fever, hyponatremia, elevated troponins (01 Oct 2022 12:01)      INTERVAL HPI/OVERNIGHT EVENTS:  Patient seen and examined earlier this morning  lying comfortably in bed  in nad  afebrile for 24hrs  denies any complaints     REVIEW OF SYSTEMS:  CONSTITUTIONAL: No fever, weight loss, or fatigue  EYES: No eye pain, visual disturbances, or discharge  ENMT:  No difficulty hearing, tinnitus, vertigo; No sinus or throat pain  NECK: No pain or stiffness  RESPIRATORY: No cough, wheezing, chills or hemoptysis; No shortness of breath  CARDIOVASCULAR: No chest pain, palpitations, dizziness, or leg swelling  GASTROINTESTINAL: No abdominal or epigastric pain. No nausea, vomiting, or hematemesis; No diarrhea or constipation. No melena or hematochezia.  GENITOURINARY: No dysuria, frequency, hematuria, or incontinence  NEUROLOGICAL: No headaches, memory loss, loss of strength, numbness, or tremors  SKIN: No itching, burning, rashes, or lesions   LYMPH NODES: No enlarged glands  ENDOCRINE: No heat or cold intolerance; No hair loss  MUSCULOSKELETAL: No joint pain or swelling; No muscle, back, or extremity pain  PSYCHIATRIC: No depression, anxiety, mood swings, or difficulty sleeping  HEME/LYMPH: No easy bruising, or bleeding gums  ALLERY AND IMMUNOLOGIC: No hives or eczema    Vital Signs Last 24 Hrs  T(C): 36.3 (04 Oct 2022 13:00), Max: 37.1 (03 Oct 2022 21:09)  T(F): 97.3 (04 Oct 2022 13:00), Max: 98.7 (03 Oct 2022 21:09)  HR: 84 (04 Oct 2022 13:00) (84 - 89)  BP: 107/57 (04 Oct 2022 13:00) (107/57 - 116/66)  BP(mean): --  RR: 18 (04 Oct 2022 13:00) (18 - 18)  SpO2: 100% (03 Oct 2022 21:09) (100% - 100%)    Parameters below as of 03 Oct 2022 21:09  Patient On (Oxygen Delivery Method): room air      PHYSICAL EXAM:  GENERAL: NAD, well-groomed, well-developed  HEAD:  Atraumatic, Normocephalic  EYES: EOMI, PERRLA, conjunctiva and sclera clear  ENMT: No tonsillar erythema, exudates, or enlargement; Moist mucous membranes, Good dentition, No lesions  NECK: Supple, No JVD, Normal thyroid  NERVOUS SYSTEM:  Alert & Oriented X3, Good concentration; Moves all extremities   CHEST/LUNG: decreased breath sounds at bases   HEART: Regular rate and rhythm; No murmurs, rubs, or gallops  ABDOMEN: Soft, Nontender, Nondistended; Bowel sounds present  EXTREMITIES:  2+ Peripheral Pulses, No clubbing, cyanosis, or edema  LYMPH: No lymphadenopathy noted  SKIN: No rashes or lesions    Consultant(s) Notes Reviewed:  [x ] YES  [ ] NO  Care Discussed with Consultants/Other Providers [ x] YES  [ ] NO    LAB:                          11.6   4.23  )-----------( 233      ( 04 Oct 2022 06:13 )             34.7     10-04    138  |  105  |  9<L>  ----------------------------<  91  4.2   |  25  |  <0.5<L>    Ca    8.1<L>      04 Oct 2022 06:13  Mg     2.1     10-03    TPro  5.5<L>  /  Alb  3.4<L>  /  TBili  0.3  /  DBili  x   /  AST  124<H>  /  ALT  168<H>  /  AlkPhos  65  10-04      Drug Dosing Weight  Height (cm): 160 (02 Oct 2022 00:14)  Weight (kg): 65.8 (01 Oct 2022 21:00)  BMI (kg/m2): 25.7 (02 Oct 2022 00:14)  BSA (m2): 1.69 (02 Oct 2022 00:14)      Urinalysis Basic - ( 01 Oct 2022 01:10 )    Color: Light Yellow / Appearance: Clear / S.013 / pH: x  Gluc: x / Ketone: Negative  / Bili: Negative / Urobili: <2 mg/dL   Blood: x / Protein: Trace / Nitrite: Negative   Leuk Esterase: Negative / RBC: 0 /HPF / WBC 0 /HPF   Sq Epi: x / Non Sq Epi: 0 /HPF / Bacteria: Negative        RADIOLOGY & ADDITIONAL TESTS:  Imaging Personally Reviewed:  [x] YES  [ ] NO  < from: CT Chest w/ IV Cont (10.01.22 @ 06:33) >  IMPRESSION:      Resolved pericardial effusion    Resolved right pleural effusion    Markedly decreased left pleural effusion.    Increasing opacity in the left lung base and lingula. Although this may   reflect atelectasis, nevertheless, superimposed infectious processis   difficult to exclude.    < end of copied text >      MEDICATIONS  (STANDING):  enoxaparin Injectable 40 milliGRAM(s) SubCutaneous every 24 hours  metoprolol succinate ER 25 milliGRAM(s) Oral daily    MEDICATIONS  (PRN):  acetaminophen     Tablet .. 650 milliGRAM(s) Oral every 6 hours PRN Temp greater or equal to 38C (100.4F), Mild Pain (1 - 3)  aluminum hydroxide/magnesium hydroxide/simethicone Suspension 30 milliLiter(s) Oral every 4 hours PRN Dyspepsia  melatonin 3 milliGRAM(s) Oral at bedtime PRN Insomnia  ondansetron Injectable 4 milliGRAM(s) IV Push every 8 hours PRN Nausea and/or Vomiting

## 2022-10-05 ENCOUNTER — TRANSCRIPTION ENCOUNTER (OUTPATIENT)
Age: 22
End: 2022-10-05

## 2022-10-05 VITALS
OXYGEN SATURATION: 98 % | DIASTOLIC BLOOD PRESSURE: 58 MMHG | RESPIRATION RATE: 18 BRPM | SYSTOLIC BLOOD PRESSURE: 111 MMHG | HEART RATE: 91 BPM | TEMPERATURE: 98 F

## 2022-10-05 LAB
ALBUMIN SERPL ELPH-MCNC: 3.4 G/DL — LOW (ref 3.5–5.2)
ALP SERPL-CCNC: 88 U/L — SIGNIFICANT CHANGE UP (ref 30–115)
ALT FLD-CCNC: 219 U/L — HIGH (ref 0–41)
ANION GAP SERPL CALC-SCNC: 11 MMOL/L — SIGNIFICANT CHANGE UP (ref 7–14)
AST SERPL-CCNC: 137 U/L — HIGH (ref 0–41)
BASOPHILS # BLD AUTO: 0.03 K/UL — SIGNIFICANT CHANGE UP (ref 0–0.2)
BASOPHILS NFR BLD AUTO: 0.3 % — SIGNIFICANT CHANGE UP (ref 0–1)
BILIRUB SERPL-MCNC: 0.2 MG/DL — SIGNIFICANT CHANGE UP (ref 0.2–1.2)
BUN SERPL-MCNC: 11 MG/DL — SIGNIFICANT CHANGE UP (ref 10–20)
CALCIUM SERPL-MCNC: 8.6 MG/DL — SIGNIFICANT CHANGE UP (ref 8.4–10.5)
CHLORIDE SERPL-SCNC: 100 MMOL/L — SIGNIFICANT CHANGE UP (ref 98–110)
CO2 SERPL-SCNC: 25 MMOL/L — SIGNIFICANT CHANGE UP (ref 17–32)
CREAT SERPL-MCNC: 0.5 MG/DL — LOW (ref 0.7–1.5)
EGFR: 149 ML/MIN/1.73M2 — SIGNIFICANT CHANGE UP
EOSINOPHIL # BLD AUTO: 0.28 K/UL — SIGNIFICANT CHANGE UP (ref 0–0.7)
EOSINOPHIL NFR BLD AUTO: 2.7 % — SIGNIFICANT CHANGE UP (ref 0–8)
FUNGITELL: 41 PG/ML — SIGNIFICANT CHANGE UP
GLUCOSE SERPL-MCNC: 115 MG/DL — HIGH (ref 70–99)
HCT VFR BLD CALC: 32.7 % — LOW (ref 42–52)
HGB BLD-MCNC: 11.1 G/DL — LOW (ref 14–18)
IMM GRANULOCYTES NFR BLD AUTO: 0.5 % — HIGH (ref 0.1–0.3)
LYMPHOCYTES # BLD AUTO: 1.45 K/UL — SIGNIFICANT CHANGE UP (ref 1.2–3.4)
LYMPHOCYTES # BLD AUTO: 14.1 % — LOW (ref 20.5–51.1)
MCHC RBC-ENTMCNC: 28.1 PG — SIGNIFICANT CHANGE UP (ref 27–31)
MCHC RBC-ENTMCNC: 33.9 G/DL — SIGNIFICANT CHANGE UP (ref 32–37)
MCV RBC AUTO: 82.8 FL — SIGNIFICANT CHANGE UP (ref 80–94)
MONOCYTES # BLD AUTO: 0.52 K/UL — SIGNIFICANT CHANGE UP (ref 0.1–0.6)
MONOCYTES NFR BLD AUTO: 5.1 % — SIGNIFICANT CHANGE UP (ref 1.7–9.3)
NEUTROPHILS # BLD AUTO: 7.94 K/UL — HIGH (ref 1.4–6.5)
NEUTROPHILS NFR BLD AUTO: 77.3 % — HIGH (ref 42.2–75.2)
NRBC # BLD: 0 /100 WBCS — SIGNIFICANT CHANGE UP (ref 0–0)
PLATELET # BLD AUTO: 237 K/UL — SIGNIFICANT CHANGE UP (ref 130–400)
POTASSIUM SERPL-MCNC: 3.8 MMOL/L — SIGNIFICANT CHANGE UP (ref 3.5–5)
POTASSIUM SERPL-SCNC: 3.8 MMOL/L — SIGNIFICANT CHANGE UP (ref 3.5–5)
PROT SERPL-MCNC: 5.9 G/DL — LOW (ref 6–8)
RBC # BLD: 3.95 M/UL — LOW (ref 4.7–6.1)
RBC # FLD: 14 % — SIGNIFICANT CHANGE UP (ref 11.5–14.5)
SODIUM SERPL-SCNC: 136 MMOL/L — SIGNIFICANT CHANGE UP (ref 135–146)
WBC # BLD: 10.27 K/UL — SIGNIFICANT CHANGE UP (ref 4.8–10.8)
WBC # FLD AUTO: 10.27 K/UL — SIGNIFICANT CHANGE UP (ref 4.8–10.8)

## 2022-10-05 PROCEDURE — 99239 HOSP IP/OBS DSCHRG MGMT >30: CPT

## 2022-10-05 RX ADMIN — Medication 25 MILLIGRAM(S): at 05:09

## 2022-10-05 RX ADMIN — ENOXAPARIN SODIUM 40 MILLIGRAM(S): 100 INJECTION SUBCUTANEOUS at 05:10

## 2022-10-05 NOTE — DISCHARGE NOTE PROVIDER - HOSPITAL COURSE
23yo M Greek-speaking with recent admission hx of sepsis c/b  myocarditis, rhabdomyolysis, large pericardial effusion s/p pericardial window (9/26)  and HFrEF (improved to 35-40%) presenting for fever. Fever started this morning associated with L heel pain that resolves with walking around, otherwise no complaints. Patient denies any trauma injury to leg. He denies any chest pain, sob, cough since previous discharge (9/28). He denies pain at site of pericardiocentesis. To note he has stayed at home since discharge and started feeling tired and lethargic with fever and chills on day of this admission. In ED, positive fever, chills, leg pain; negative nausea, vomiting, chest pain, abdominal pain, sob, cough, constipation, diarrhea, incontinence.     During hospital course, pt evaluated by ID and rheum. ID recommended to monitor pt off of antibiotics. Rheum suggested an infectious process likely source of infection. Pt's clinical condition improved and ready for discharge/outpatient follow up.    #Sepsis, POA  #suspected GNR Pneumonia vs. atelectasis  #Recent hx of myocarditis and pericardial effusion s/p pericardial window  - Febrile to 102.4 in ED  - CT Chest w IVC 10/01: Increasing opacity in the left lung base and lingula. Although this may reflect atelectasis, superimposed infectious process is difficult to exclude. Resolved pericardial effusion. Resolved right pleural effusion; Markedly decreased left pleural effusion  - Rheum workup was negative on recent admission  - ID - monitor off abx  - sent fungitel (pending)  - reviewed procal, ferritin, cpk   - follow up cultures- NGTD  - per rheum: pt's current findings are most suggestive of an infectious process with secondary rhabdomyolysis in the setting of fevers, possibly a viral infection (such as Coxsackie virus). Low suspicion for inflammatory myopathy or an autoimmune reaction based on pt's symptoms.    #Transaminitis - stable since yesterday    #Hyponatremia - resolved  - s/p IVF

## 2022-10-05 NOTE — DISCHARGE NOTE PROVIDER - CARE PROVIDER_API CALL
Juan Santoyo)  Internal Medicine  [address] [place] [address], [address] [place] [address]  Phone: [phone]  Fax: [phone]  Follow Up Time: 1 week

## 2022-10-05 NOTE — PROGRESS NOTE ADULT - SUBJECTIVE AND OBJECTIVE BOX
DEMETRIA LAZCANO  22y  [facility] [id number]      Patient is a 22y old  Male who presents with a chief complaint of fever, hyponatremia, elevated troponins (05 Oct 2022 09:59)      INTERVAL HPI/OVERNIGHT EVENTS:    no acute events overnight     REVIEW OF SYSTEMS:  CONSTITUTIONAL: No fever, weight loss, or fatigue  EYES: No eye pain, visual disturbances, or discharge  ENMT:  No difficulty hearing, tinnitus, vertigo; No sinus or throat pain  NECK: No pain or stiffness  BREASTS: No pain, masses, or nipple discharge  RESPIRATORY: No cough, wheezing, chills or hemoptysis; No shortness of breath  CARDIOVASCULAR: No chest pain, palpitations, dizziness, or leg swelling  GASTROINTESTINAL: No abdominal or epigastric pain. No nausea, vomiting, or hematemesis; No diarrhea or constipation. No melena or hematochezia.  GENITOURINARY: No dysuria, frequency, hematuria, or incontinence  NEUROLOGICAL: No headaches, memory loss, loss of strength, numbness, or tremors  SKIN: No itching, burning, rashes, or lesions   LYMPH NODES: No enlarged glands  ENDOCRINE: No heat or cold intolerance; No hair loss  MUSCULOSKELETAL: No joint pain or swelling; No muscle, back, or extremity pain  PSYCHIATRIC: No depression, anxiety, mood swings, or difficulty sleeping  HEME/LYMPH: No easy bruising, or bleeding gums  ALLERY AND IMMUNOLOGIC: No hives or eczema  FAMILY HISTORY:    T(C): 35.6 (10-05-22 @ 04:36), Max: 36.6 (10-04-22 @ 22:05)  HR: 69 (10-05-22 @ 04:36) (69 - 84)  BP: 94/53 (10-05-22 @ 04:36) (94/53 - 107/57)  RR: 18 (10-05-22 @ 04:36) (18 - 18)  SpO2: --  Wt(kg): --Vital Signs Last 24 Hrs  T(C): 35.6 (05 Oct 2022 04:36), Max: 36.6 (04 Oct 2022 22:05)  T(F): 96 (05 Oct 2022 04:36), Max: 97.8 (04 Oct 2022 22:05)  HR: 69 (05 Oct 2022 04:36) (69 - 84)  BP: 94/53 (05 Oct 2022 04:36) (94/53 - 107/57)  BP(mean): --  RR: 18 (05 Oct 2022 04:36) (18 - 18)  SpO2: --        PHYSICAL EXAM:  GENERAL: NAD, well-groomed, well-developed  HEAD:  Atraumatic, Normocephalic  EYES: EOMI, PERRLA, conjunctiva and sclera clear  ENMT: No tonsillar erythema, exudates, or enlargement; Moist mucous membranes, Good dentition, No lesions  NECK: Supple, No JVD, Normal thyroid  NERVOUS SYSTEM:  Alert & Oriented X3, Good concentration; Motor Strength 5/5 B/L upper and lower extremities; DTRs 2+ intact and symmetric  PULM: Clear to auscultation bilaterally  CARDIAC: Regular rate and rhythm; No murmurs, rubs, or gallops  GI: Soft, Nontender, Nondistended; Bowel sounds present  EXTREMITIES:  2+ Peripheral Pulses, No clubbing, cyanosis, or edema  LYMPH: No lymphadenopathy noted  SKIN: No rashes or lesions    Consultant(s) Notes Reviewed:  [x ] YES  [ ] NO  Care Discussed with Consultants/Other Providers [ x] YES  [ ] NO    LABS:                            11.1   10.27 )-----------( 237      ( 05 Oct 2022 09:26 )             32.7   10-05    136  |  100  |  11  ----------------------------<  115<H>  3.8   |  25  |  0.5<L>    Ca    8.6      05 Oct 2022 09:26    TPro  5.9<L>  /  Alb  3.4<L>  /  TBili  0.2  /  DBili  x   /  AST  137<H>  /  ALT  219<H>  /  AlkPhos  88  10-05            Culture - Blood (collected 02 Oct 2022 17:08)  Source: .Blood None  Preliminary Report (04 Oct 2022 01:02):    No growth to date.    Culture - Blood (collected 02 Oct 2022 17:08)  Source: .Blood None  Preliminary Report (04 Oct 2022 01:02):    No growth to date.      acetaminophen     Tablet .. 650 milliGRAM(s) Oral every 6 hours PRN  aluminum hydroxide/magnesium hydroxide/simethicone Suspension 30 milliLiter(s) Oral every 4 hours PRN  enoxaparin Injectable 40 milliGRAM(s) SubCutaneous every 24 hours  melatonin 3 milliGRAM(s) Oral at bedtime PRN  metoprolol succinate ER 25 milliGRAM(s) Oral daily  ondansetron Injectable 4 milliGRAM(s) IV Push every 8 hours PRN      HEALTH ISSUES - PROBLEM Dx:          Case Discussed with House Staff   Spectra [phone]

## 2022-10-05 NOTE — DISCHARGE NOTE PROVIDER - NSDCCPCAREPLAN_GEN_ALL_CORE_FT
PRINCIPAL DISCHARGE DIAGNOSIS  Diagnosis: Fever  Assessment and Plan of Treatment: During you hospital course, you were treated for sepsis. You were given some antibiotics and multiple lab studies were peformed. Infectious disease and rheumatology doctors were following your care. It was suscpected that an infectious process was the cause of your symptoms, however you improved after being off of any antibiotics. Some additinonal lab studies to look for infectious processes are still in progress. Please follow up with a primary care physician to follow up these labs.  Contact a health care provider if:  •You do not feel like you are getting better or regaining strength.  •You have muscle or joint pain.  •You frequently feel tired.  •You are having trouble coping with your recovery.  •You have nightmares, or trouble falling asleep or staying asleep.  •You feel sad, down, or depressed more often than not, every day for more than 2 weeks.  •You have difficulty concentrating.  •You feel irritable or you cry for no reason.  Get help right away if:  •You have difficulty breathing.  •You have a rapid or skipping heartbeat.  •You become confused or disoriented.  •You see, hear, or feel things that do not exist (hallucinations).  •You have a high fever.  •You have an infection that is getting worse or not getting better.  •You have thoughts of hurting yourself or others.      SECONDARY DISCHARGE DIAGNOSES  Diagnosis: Hyponatremia  Assessment and Plan of Treatment:     Diagnosis: Cardiac enzymes elevated  Assessment and Plan of Treatment:

## 2022-10-05 NOTE — PROGRESS NOTE ADULT - REASON FOR ADMISSION
fever, hyponatremia, elevated troponins

## 2022-10-05 NOTE — DISCHARGE NOTE NURSING/CASE MANAGEMENT/SOCIAL WORK - PATIENT PORTAL LINK FT
You can access the FollowMyHealth Patient Portal offered by [facility] by registering at the following website: [email or web address]. By joining [employer]’s FollowMyHealth portal, you will also be able to view your health information using other applications (apps) compatible with our system.

## 2022-10-05 NOTE — DISCHARGE NOTE PROVIDER - NSDCFUSCHEDAPPT_GEN_ALL_CORE_FT
Juan Santoyo  [facility]  [facility] [address]  Scheduled Appointment: 10/19/2022

## 2022-10-05 NOTE — DISCHARGE NOTE PROVIDER - NSDCFUADDINST_GEN_ALL_CORE_FT
Please follow up with primary care in [facility] within 1 week to review pending labs from this admission.

## 2022-10-05 NOTE — DISCHARGE NOTE PROVIDER - ATTENDING ATTESTATION STATEMENT
Palliative care encounter
I have personally seen and examined the patient. I have collaborated with and supervised the

## 2022-10-05 NOTE — PROGRESS NOTE ADULT - ASSESSMENT
21yo M Salvadorean-speaking with recent admission hx of sepsis c/b myocarditis, rhabdomyolysis, large pericardial effusion s/p pericardial window (9/26)  and HFrEF (improved to 35-40%) presenting for fever    #Sepsis, POA  #suspected GNR Pneumonia vs. atelectasis  #Recent hx of myocarditis and pericardial effusion s/p pericardial window  rheum workup pending , outpatient follow up ,    #Transaminitis -remain stable , repeat in one week     #Overweight BMI 25 patient needs to see dieitian outpatient for further evaluation    PROGRESS NOTE HANDOFF    Pending: dc home time spent 45 minutes    Family discussion: patient verbalized understanding and agreeable to plan of care     Disposition: home

## 2022-10-05 NOTE — DISCHARGE NOTE PROVIDER - NSDCMRMEDTOKEN_GEN_ALL_CORE_FT
acetaminophen 325 mg oral tablet: 2 tab(s) orally every 6 hours, As needed, Temp greater or equal to 38C (100.4F), Mild Pain (1 - 3)  Metoprolol Succinate ER 25 mg oral tablet, extended release: 1 tab(s) orally once a day

## 2022-10-05 NOTE — DISCHARGE NOTE NURSING/CASE MANAGEMENT/SOCIAL WORK - NSDCPEFALRISK_GEN_ALL_CORE
For information on Fall & Injury Prevention, visit: [email or web address] OR  [email or web address] OR  https://www.cdc.gov/steadi/patient.html

## 2022-10-06 LAB
B19V DNA FLD QL NAA+PROBE: SIGNIFICANT CHANGE UP IU/ML
CULTURE RESULTS: SIGNIFICANT CHANGE UP
CULTURE RESULTS: SIGNIFICANT CHANGE UP
SPECIMEN SOURCE: SIGNIFICANT CHANGE UP
SPECIMEN SOURCE: SIGNIFICANT CHANGE UP

## 2022-10-07 LAB
CV B1 AB TITR FLD: NEGATIVE — SIGNIFICANT CHANGE UP
CV B2 AB TITR FLD: NEGATIVE — SIGNIFICANT CHANGE UP
CV B3 AB TITR FLD: NEGATIVE — SIGNIFICANT CHANGE UP
CV B4 AB TITR FLD: NEGATIVE — SIGNIFICANT CHANGE UP
CV B5 AB TITR FLD: NEGATIVE — SIGNIFICANT CHANGE UP
CV B6 AB TITR FLD: NEGATIVE — SIGNIFICANT CHANGE UP
SRP AB SERPL QL: SIGNIFICANT CHANGE UP

## 2022-10-08 LAB
CULTURE RESULTS: SIGNIFICANT CHANGE UP
CULTURE RESULTS: SIGNIFICANT CHANGE UP
SPECIMEN SOURCE: SIGNIFICANT CHANGE UP
SPECIMEN SOURCE: SIGNIFICANT CHANGE UP

## 2022-10-10 DIAGNOSIS — A41.50 GRAM-NEGATIVE SEPSIS, UNSPECIFIED: ICD-10-CM

## 2022-10-10 DIAGNOSIS — I50.22 CHRONIC SYSTOLIC (CONGESTIVE) HEART FAILURE: ICD-10-CM

## 2022-10-10 DIAGNOSIS — R74.01 ELEVATION OF LEVELS OF LIVER TRANSAMINASE LEVELS: ICD-10-CM

## 2022-10-10 DIAGNOSIS — I31.9 DISEASE OF PERICARDIUM, UNSPECIFIED: ICD-10-CM

## 2022-10-10 DIAGNOSIS — E87.1 HYPO-OSMOLALITY AND HYPONATREMIA: ICD-10-CM

## 2022-10-10 DIAGNOSIS — M62.82 RHABDOMYOLYSIS: ICD-10-CM

## 2022-10-10 DIAGNOSIS — I11.0 HYPERTENSIVE HEART DISEASE WITH HEART FAILURE: ICD-10-CM

## 2022-10-10 DIAGNOSIS — I31.4 CARDIAC TAMPONADE: ICD-10-CM

## 2022-10-10 DIAGNOSIS — J98.11 ATELECTASIS: ICD-10-CM

## 2022-10-10 DIAGNOSIS — J15.6 PNEUMONIA DUE TO OTHER GRAM-NEGATIVE BACTERIA: ICD-10-CM

## 2022-10-10 DIAGNOSIS — J90 PLEURAL EFFUSION, NOT ELSEWHERE CLASSIFIED: ICD-10-CM

## 2022-10-10 DIAGNOSIS — R77.8 OTHER SPECIFIED ABNORMALITIES OF PLASMA PROTEINS: ICD-10-CM

## 2022-10-11 LAB
EJ: NEGATIVE — SIGNIFICANT CHANGE UP
ENA JO1 AB SER IA-ACNC: <20 UNITS — SIGNIFICANT CHANGE UP
ENA PM/SCL AB SER-ACNC: <20 UNITS — SIGNIFICANT CHANGE UP
ENA SM+RNP AB SER IA-ACNC: <20 UNITS — SIGNIFICANT CHANGE UP
ENA SS-A IGG SER QL: <20 UNITS — SIGNIFICANT CHANGE UP
FIBRILLARIN AB SER QL: NEGATIVE — SIGNIFICANT CHANGE UP
KU AB SER QL: NEGATIVE — SIGNIFICANT CHANGE UP
MDA-5 (P140)(CADM-140): <20 UNITS — SIGNIFICANT CHANGE UP
MI2 AB SER QL: NEGATIVE — SIGNIFICANT CHANGE UP
NXP-2 (P140): <20 UNITS — SIGNIFICANT CHANGE UP
OJ AB SER QL: NEGATIVE — SIGNIFICANT CHANGE UP
PL12 AB SER QL: NEGATIVE — SIGNIFICANT CHANGE UP
PL7 AB SER QL: NEGATIVE — SIGNIFICANT CHANGE UP
SRP AB SERPL QL: NEGATIVE — SIGNIFICANT CHANGE UP
TIF GAMMA (P155/140): <20 UNITS — SIGNIFICANT CHANGE UP
U2 SNRNP AB SER QL: NEGATIVE — SIGNIFICANT CHANGE UP

## 2022-10-19 ENCOUNTER — OUTPATIENT (OUTPATIENT)
Dept: OUTPATIENT SERVICES | Facility: HOSPITAL | Age: 22
LOS: 1 days | Discharge: HOME | End: 2022-10-19

## 2022-10-19 ENCOUNTER — APPOINTMENT (OUTPATIENT)
Dept: INTERNAL MEDICINE | Facility: CLINIC | Age: 22
End: 2022-10-19

## 2022-10-19 VITALS
SYSTOLIC BLOOD PRESSURE: 120 MMHG | TEMPERATURE: 96.3 F | HEIGHT: 64 IN | WEIGHT: 153 LBS | DIASTOLIC BLOOD PRESSURE: 73 MMHG | OXYGEN SATURATION: 96 % | HEART RATE: 83 BPM | BODY MASS INDEX: 26.12 KG/M2

## 2022-10-19 DIAGNOSIS — Z78.9 OTHER SPECIFIED HEALTH STATUS: ICD-10-CM

## 2022-10-19 DIAGNOSIS — K76.0 FATTY (CHANGE OF) LIVER, NOT ELSEWHERE CLASSIFIED: ICD-10-CM

## 2022-10-19 DIAGNOSIS — Z00.00 ENCOUNTER FOR GENERAL ADULT MEDICAL EXAMINATION W/OUT ABNORMAL FINDINGS: ICD-10-CM

## 2022-10-19 DIAGNOSIS — R74.01 ELEVATION OF LEVELS OF LIVER TRANSAMINASE LEVELS: ICD-10-CM

## 2022-10-19 PROCEDURE — 99214 OFFICE O/P EST MOD 30 MIN: CPT | Mod: GC

## 2022-10-19 RX ORDER — METOPROLOL SUCCINATE 25 MG/1
25 TABLET, EXTENDED RELEASE ORAL DAILY
Qty: 30 | Refills: 2 | Status: ACTIVE | COMMUNITY
Start: 2022-10-19 | End: 1900-01-01

## 2022-10-19 NOTE — REVIEW OF SYSTEMS
[Fever] : no fever [Chest Pain] : no chest pain [Lower Ext Edema] : no lower extremity edema [Shortness Of Breath] : no shortness of breath [Abdominal Pain] : no abdominal pain [Joint Pain] : no joint pain

## 2022-10-19 NOTE — ASSESSMENT
[FreeTextEntry1] : 23 y/o M Serbian-speaking recently discharged from the hospital after admission for sepsis 2/2 PNA, discharged on 10/5\par Pt had also an admission in 9/2022 where he was found to have myocarditis, rhabdomyolysis, large pericardial effusion, tamponade s/p pericardial window (9/26) and HFrEF (20% improved to 35-40% after pericardiocentesis).\par \par # HFrEF\par # H/o myopericarditis with tamponade\par - s/p pericardial window on 9/26, recent follow up with [facility]\par - on toprol 25 mg daily\par - euvolemic on exam\par - HF referral\par \par # Hepatic steatosis\par # Transaminitis\par - hep B and C negative\par - hepatology referral\par \par # HCM\par - Routine labs ordered (only lipid profile and A1C)\par - Med refilled\par - RTC in 3months or PRN

## 2022-10-19 NOTE — HISTORY OF PRESENT ILLNESS
[Family Member] : family member [FreeTextEntry1] : Establish care, hospital follow up [de-identified] : 23 y/o M Indonesian-speaking recently discharged from the hospital after admission for sepsis 2/2 PNA, discharged on 10/5\par Pt had also an admission in 9/2022 where he was found to have myocarditis, rhabdomyolysis, large pericardial effusion, tamponade s/p pericardial window (9/26) and HFrEF (20% improved to 35-40% after pericardiocentesis). Patient rheumatological work up was negative. 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

## 2022-10-19 NOTE — PHYSICAL EXAM
[No Acute Distress] : no acute distress [Normal Outer Ear/Nose] : the outer ears and nose were normal in appearance [No Respiratory Distress] : no respiratory distress  [No Accessory Muscle Use] : no accessory muscle use [Clear to Auscultation] : lungs were clear to auscultation bilaterally [Normal Rate] : normal rate  [Regular Rhythm] : with a regular rhythm [No Murmur] : no murmur heard [Normal S1, S2] : normal S1 and S2 [No Edema] : there was no peripheral edema [Soft] : abdomen soft [Non Tender] : non-tender [Non-distended] : non-distended [de-identified] : p

## 2022-10-20 LAB
25(OH)D3 SERPL-MCNC: 11 NG/ML
ALBUMIN SERPL ELPH-MCNC: 4.5 G/DL
ALP BLD-CCNC: 119 U/L
ALT SERPL-CCNC: 87 U/L
ANION GAP SERPL CALC-SCNC: 14 MMOL/L
AST SERPL-CCNC: 49 U/L
BASOPHILS # BLD AUTO: 0.06 K/UL
BASOPHILS NFR BLD AUTO: 1 %
BILIRUB SERPL-MCNC: 0.3 MG/DL
BUN SERPL-MCNC: 12 MG/DL
CALCIUM SERPL-MCNC: 10.1 MG/DL
CHLORIDE SERPL-SCNC: 103 MMOL/L
CHOLEST SERPL-MCNC: 201 MG/DL
CO2 SERPL-SCNC: 23 MMOL/L
CREAT SERPL-MCNC: 0.7 MG/DL
EGFR: 134 ML/MIN/1.73M2
EOSINOPHIL # BLD AUTO: 0.58 K/UL
EOSINOPHIL NFR BLD AUTO: 9.5 %
ESTIMATED AVERAGE GLUCOSE: 128 MG/DL
GLUCOSE SERPL-MCNC: 104 MG/DL
HBA1C MFR BLD HPLC: 6.1 %
HCT VFR BLD CALC: 42.8 %
HDLC SERPL-MCNC: 40 MG/DL
HGB BLD-MCNC: 13.5 G/DL
IMM GRANULOCYTES NFR BLD AUTO: 0.5 %
LDLC SERPL CALC-MCNC: 106 MG/DL
LYMPHOCYTES # BLD AUTO: 3.05 K/UL
LYMPHOCYTES NFR BLD AUTO: 49.8 %
MAN DIFF?: NORMAL
MCHC RBC-ENTMCNC: 27.2 PG
MCHC RBC-ENTMCNC: 31.5 G/DL
MCV RBC AUTO: 86.3 FL
MONOCYTES # BLD AUTO: 0.43 K/UL
MONOCYTES NFR BLD AUTO: 7 %
NEUTROPHILS # BLD AUTO: 1.98 K/UL
NEUTROPHILS NFR BLD AUTO: 32.2 %
NONHDLC SERPL-MCNC: 161 MG/DL
PLATELET # BLD AUTO: 411 K/UL
POTASSIUM SERPL-SCNC: 4.6 MMOL/L
PROT SERPL-MCNC: 7.3 G/DL
RBC # BLD: 4.96 M/UL
RBC # FLD: 14.3 %
SODIUM SERPL-SCNC: 140 MMOL/L
TRIGL SERPL-MCNC: 275 MG/DL
TSH SERPL-ACNC: 2.23 UIU/ML
WBC # FLD AUTO: 6.13 K/UL

## 2022-10-21 DIAGNOSIS — K76.0 FATTY (CHANGE OF) LIVER, NOT ELSEWHERE CLASSIFIED: ICD-10-CM

## 2022-10-21 DIAGNOSIS — R74.01 ELEVATION OF LEVELS OF LIVER TRANSAMINASE LEVELS: ICD-10-CM

## 2022-10-21 DIAGNOSIS — Z78.9 OTHER SPECIFIED HEALTH STATUS: ICD-10-CM

## 2022-10-21 DIAGNOSIS — I50.20 UNSPECIFIED SYSTOLIC (CONGESTIVE) HEART FAILURE: ICD-10-CM

## 2022-11-23 ENCOUNTER — APPOINTMENT (OUTPATIENT)
Dept: CARDIOLOGY | Facility: CLINIC | Age: 22
End: 2022-11-23

## 2022-11-23 VITALS
WEIGHT: 161.9 LBS | SYSTOLIC BLOOD PRESSURE: 120 MMHG | OXYGEN SATURATION: 98 % | DIASTOLIC BLOOD PRESSURE: 84 MMHG | BODY MASS INDEX: 27.64 KG/M2 | HEART RATE: 73 BPM | HEIGHT: 64 IN

## 2022-11-23 DIAGNOSIS — I50.20 UNSPECIFIED SYSTOLIC (CONGESTIVE) HEART FAILURE: ICD-10-CM

## 2022-11-23 DIAGNOSIS — R73.03 PREDIABETES.: ICD-10-CM

## 2022-11-23 PROCEDURE — 99214 OFFICE O/P EST MOD 30 MIN: CPT

## 2022-11-23 PROCEDURE — 93000 ELECTROCARDIOGRAM COMPLETE: CPT

## 2022-12-01 NOTE — PHYSICAL THERAPY INITIAL EVALUATION ADULT - BED MOBILITY TRAINING, PT EVAL
Hpi Title: Evaluation of Skin Lesions How Severe Are Your Spot(S)?: mild Goal: pt will come supine to sit to supine independent by discharge to facilitate return to PLOF.

## 2022-12-05 PROBLEM — I50.20 HFREF (HEART FAILURE WITH REDUCED EJECTION FRACTION): Status: ACTIVE | Noted: 2022-10-19

## 2022-12-05 PROBLEM — R73.03 PREDIABETES: Status: ACTIVE | Noted: 2022-12-05

## 2022-12-05 RX ORDER — LOSARTAN POTASSIUM 25 MG/1
25 TABLET, FILM COATED ORAL
Qty: 30 | Refills: 3 | Status: ACTIVE | COMMUNITY
Start: 2022-12-05 | End: 1900-01-01

## 2022-12-05 NOTE — HISTORY OF PRESENT ILLNESS
[FreeTextEntry1] : 21 y/o M with a PMHx of DM, recently admitted to [facility] from 10/1 to 10-5 for sepsis 2/2 PNA, c/b myocarditis and rhabdomyolysis. He was also previously admitted with a  large pericardial effusion s/p pericardial window (9/26)  and HFrEF (improved to 35-40% from 20% after pericardiocentesis). Here today for a hospital follow-up.\par \par The patient is accompanied by his brother today, he reports no limitations on his ET. He is not checking his weight or BP at home. The patient denies CP, bleeding, SOB at rest, PND, abdominal discomfort, LH/dizziness, BLE edema, palpitations, and syncope.  \par

## 2023-01-10 ENCOUNTER — APPOINTMENT (OUTPATIENT)
Dept: CARDIOLOGY | Facility: CLINIC | Age: 23
End: 2023-01-10

## 2023-02-01 ENCOUNTER — APPOINTMENT (OUTPATIENT)
Dept: INTERNAL MEDICINE | Facility: CLINIC | Age: 23
End: 2023-02-01

## 2023-03-06 ENCOUNTER — APPOINTMENT (OUTPATIENT)
Dept: HEPATOLOGY | Facility: CLINIC | Age: 23
End: 2023-03-06

## 2023-03-06 ENCOUNTER — APPOINTMENT (OUTPATIENT)
Age: 23
End: 2023-03-06

## 2023-04-25 ENCOUNTER — APPOINTMENT (OUTPATIENT)
Dept: CARDIOLOGY | Facility: CLINIC | Age: 23
End: 2023-04-25

## 2025-06-12 NOTE — CONSULT NOTE ADULT - NS ATTEND OPT1A GEN_ALL_CORE
What to Expect Post Anesthesia    Rest and take it easy for the first 24 hours.  A responsible adult is recommended to remain with you during that time.  It is normal to feel sleepy.  We encourage you to not do anything that requires balance, judgment or coordination.    FOR 24 HOURS DO NOT:  Drive, operate machinery or run household appliances.  Drink beer or alcoholic beverages.  Make important decisions or sign legal documents.    Special instructions: See attached handout    To avoid nausea, slowly advance diet as tolerated, avoiding spicy or greasy foods for the first day.  Add more substantial food to your diet according to your provider's instructions.  INCREASE FLUIDS AND FIBER TO AVOID CONSTIPATION.    MILD FLU-LIKE SYMPTOMS ARE NORMAL.  YOU MAY EXPERIENCE GENERALIZED MUSCLE ACHES, THROAT IRRITATION, HEADACHE AND/OR SOME NAUSEA.    If any questions arise, call your provider.  If your provider is not available, please feel free to call the [facility] at [phone].    MEDICATIONS: Resume taking daily medication.  Take prescribed pain medication with food.  If no medication is prescribed, you may take non-aspirin pain medication if needed.  PAIN MEDICATION CAN BE VERY CONSTIPATING.  Take a stool softener or laxative such as senokot, pericolace, or milk of magnesia if needed.    Last pain medication given at     Toradol (similar to Ibuprofen/ Motrin) given at 12:10 PM. Okay for next dose of Ibuprofen/ Motrin at 6:10 PM.    Tylenol given at 11:05 AM. Okay for next dose at 5:05 PM.    History/Exam/Medical decision making